# Patient Record
Sex: MALE | Race: WHITE | Employment: OTHER | URBAN - METROPOLITAN AREA
[De-identification: names, ages, dates, MRNs, and addresses within clinical notes are randomized per-mention and may not be internally consistent; named-entity substitution may affect disease eponyms.]

---

## 2017-01-11 RX ORDER — SIMVASTATIN 20 MG
TABLET ORAL
Qty: 90 TABLET | Refills: 0 | Status: SHIPPED | OUTPATIENT
Start: 2017-01-11 | End: 2017-04-07 | Stop reason: SDUPTHER

## 2017-04-07 ENCOUNTER — OFFICE VISIT (OUTPATIENT)
Dept: FAMILY MEDICINE CLINIC | Age: 67
End: 2017-04-07

## 2017-04-07 VITALS
DIASTOLIC BLOOD PRESSURE: 80 MMHG | SYSTOLIC BLOOD PRESSURE: 120 MMHG | HEIGHT: 76 IN | WEIGHT: 219.4 LBS | BODY MASS INDEX: 26.72 KG/M2

## 2017-04-07 DIAGNOSIS — E78.2 MIXED HYPERLIPIDEMIA: Primary | ICD-10-CM

## 2017-04-07 DIAGNOSIS — I48.0 PAROXYSMAL ATRIAL FIBRILLATION (HCC): ICD-10-CM

## 2017-04-07 DIAGNOSIS — Z29.9 PREVENTIVE MEASURE: ICD-10-CM

## 2017-04-07 DIAGNOSIS — R35.1 BPH ASSOCIATED WITH NOCTURIA: ICD-10-CM

## 2017-04-07 DIAGNOSIS — N40.1 BPH ASSOCIATED WITH NOCTURIA: ICD-10-CM

## 2017-04-07 DIAGNOSIS — F41.9 ANXIETY: ICD-10-CM

## 2017-04-07 LAB
A/G RATIO: 2 (ref 1.1–2.2)
ALBUMIN SERPL-MCNC: 4.6 G/DL (ref 3.4–5)
ALP BLD-CCNC: 87 U/L (ref 40–129)
ALT SERPL-CCNC: 21 U/L (ref 10–40)
ANION GAP SERPL CALCULATED.3IONS-SCNC: 13 MMOL/L (ref 3–16)
AST SERPL-CCNC: 24 U/L (ref 15–37)
BILIRUB SERPL-MCNC: 0.7 MG/DL (ref 0–1)
BUN BLDV-MCNC: 14 MG/DL (ref 7–20)
CALCIUM SERPL-MCNC: 9.1 MG/DL (ref 8.3–10.6)
CHLORIDE BLD-SCNC: 102 MMOL/L (ref 99–110)
CHOLESTEROL, TOTAL: 192 MG/DL (ref 0–199)
CO2: 25 MMOL/L (ref 21–32)
CREAT SERPL-MCNC: 0.8 MG/DL (ref 0.8–1.3)
GFR AFRICAN AMERICAN: >60
GFR NON-AFRICAN AMERICAN: >60
GLOBULIN: 2.3 G/DL
GLUCOSE BLD-MCNC: 81 MG/DL (ref 70–99)
HCT VFR BLD CALC: 50.6 % (ref 40.5–52.5)
HDLC SERPL-MCNC: 65 MG/DL (ref 40–60)
HEMOGLOBIN: 16.6 G/DL (ref 13.5–17.5)
HEPATITIS C ANTIBODY INTERPRETATION: NORMAL
LDL CHOLESTEROL CALCULATED: 112 MG/DL
MCH RBC QN AUTO: 30.9 PG (ref 26–34)
MCHC RBC AUTO-ENTMCNC: 32.8 G/DL (ref 31–36)
MCV RBC AUTO: 94.1 FL (ref 80–100)
PDW BLD-RTO: 14.2 % (ref 12.4–15.4)
PLATELET # BLD: 109 K/UL (ref 135–450)
PMV BLD AUTO: 12.1 FL (ref 5–10.5)
POTASSIUM SERPL-SCNC: 4.7 MMOL/L (ref 3.5–5.1)
PROSTATE SPECIFIC ANTIGEN: 1.53 NG/ML (ref 0–4)
RBC # BLD: 5.37 M/UL (ref 4.2–5.9)
SODIUM BLD-SCNC: 140 MMOL/L (ref 136–145)
TOTAL PROTEIN: 6.9 G/DL (ref 6.4–8.2)
TRIGL SERPL-MCNC: 76 MG/DL (ref 0–150)
TSH REFLEX: 2.02 UIU/ML (ref 0.27–4.2)
VLDLC SERPL CALC-MCNC: 15 MG/DL
WBC # BLD: 5.6 K/UL (ref 4–11)

## 2017-04-07 PROCEDURE — G8420 CALC BMI NORM PARAMETERS: HCPCS | Performed by: FAMILY MEDICINE

## 2017-04-07 PROCEDURE — 3017F COLORECTAL CA SCREEN DOC REV: CPT | Performed by: FAMILY MEDICINE

## 2017-04-07 PROCEDURE — G8427 DOCREV CUR MEDS BY ELIG CLIN: HCPCS | Performed by: FAMILY MEDICINE

## 2017-04-07 PROCEDURE — 99214 OFFICE O/P EST MOD 30 MIN: CPT | Performed by: FAMILY MEDICINE

## 2017-04-07 PROCEDURE — 1123F ACP DISCUSS/DSCN MKR DOCD: CPT | Performed by: FAMILY MEDICINE

## 2017-04-07 PROCEDURE — 4040F PNEUMOC VAC/ADMIN/RCVD: CPT | Performed by: FAMILY MEDICINE

## 2017-04-07 PROCEDURE — 4004F PT TOBACCO SCREEN RCVD TLK: CPT | Performed by: FAMILY MEDICINE

## 2017-04-07 PROCEDURE — 36415 COLL VENOUS BLD VENIPUNCTURE: CPT | Performed by: FAMILY MEDICINE

## 2017-04-07 RX ORDER — SIMVASTATIN 20 MG
TABLET ORAL
Qty: 90 TABLET | Refills: 1 | Status: SHIPPED | OUTPATIENT
Start: 2017-04-07 | End: 2017-10-07 | Stop reason: SDUPTHER

## 2017-04-07 RX ORDER — LORAZEPAM 0.5 MG/1
0.5 TABLET ORAL 2 TIMES DAILY PRN
Qty: 30 TABLET | Refills: 1 | Status: SHIPPED | OUTPATIENT
Start: 2017-04-07 | End: 2017-05-07

## 2017-04-07 ASSESSMENT — ENCOUNTER SYMPTOMS
DIARRHEA: 0
CHEST TIGHTNESS: 0
WHEEZING: 0
ABDOMINAL PAIN: 0
CONSTIPATION: 0
TROUBLE SWALLOWING: 0
BLOOD IN STOOL: 0
SHORTNESS OF BREATH: 0
SORE THROAT: 0

## 2017-04-09 ASSESSMENT — PATIENT HEALTH QUESTIONNAIRE - PHQ9
1. LITTLE INTEREST OR PLEASURE IN DOING THINGS: 1
SUM OF ALL RESPONSES TO PHQ QUESTIONS 1-9: 1
2. FEELING DOWN, DEPRESSED OR HOPELESS: 0
SUM OF ALL RESPONSES TO PHQ9 QUESTIONS 1 & 2: 1

## 2017-04-19 RX ORDER — VALACYCLOVIR HYDROCHLORIDE 500 MG/1
TABLET, FILM COATED ORAL
Qty: 8 TABLET | Refills: 0 | Status: SHIPPED | OUTPATIENT
Start: 2017-04-19 | End: 2019-02-25

## 2017-05-05 ENCOUNTER — TELEPHONE (OUTPATIENT)
Dept: FAMILY MEDICINE CLINIC | Age: 67
End: 2017-05-05

## 2017-10-07 DIAGNOSIS — E78.2 MIXED HYPERLIPIDEMIA: ICD-10-CM

## 2017-10-08 RX ORDER — SIMVASTATIN 20 MG
TABLET ORAL
Qty: 90 TABLET | Refills: 0 | Status: SHIPPED | OUTPATIENT
Start: 2017-10-08 | End: 2017-12-29 | Stop reason: SDUPTHER

## 2017-12-29 DIAGNOSIS — E78.2 MIXED HYPERLIPIDEMIA: ICD-10-CM

## 2017-12-29 RX ORDER — SIMVASTATIN 20 MG
TABLET ORAL
Qty: 90 TABLET | Refills: 0 | Status: SHIPPED | OUTPATIENT
Start: 2017-12-29 | End: 2018-02-20 | Stop reason: SDUPTHER

## 2018-02-20 ENCOUNTER — TELEPHONE (OUTPATIENT)
Dept: FAMILY MEDICINE CLINIC | Age: 68
End: 2018-02-20

## 2018-02-20 DIAGNOSIS — E78.2 MIXED HYPERLIPIDEMIA: ICD-10-CM

## 2018-02-20 RX ORDER — SIMVASTATIN 20 MG
TABLET ORAL
Qty: 90 TABLET | Refills: 1 | Status: SHIPPED | OUTPATIENT
Start: 2018-02-20 | End: 2018-03-20 | Stop reason: SDUPTHER

## 2018-02-20 NOTE — TELEPHONE ENCOUNTER
Pt coming in for an appt Thursday for BW and dalia 2-27 to see Dr Sandra Munguia  Need orders for BW  Please advise

## 2018-02-22 ENCOUNTER — NURSE ONLY (OUTPATIENT)
Dept: FAMILY MEDICINE CLINIC | Age: 68
End: 2018-02-22

## 2018-02-22 DIAGNOSIS — Z12.5 ENCOUNTER FOR SCREENING FOR MALIGNANT NEOPLASM OF PROSTATE: ICD-10-CM

## 2018-02-22 DIAGNOSIS — Z00.00 HEALTHCARE MAINTENANCE: Primary | ICD-10-CM

## 2018-02-22 LAB
A/G RATIO: 2 (ref 1.1–2.2)
ALBUMIN SERPL-MCNC: 4.6 G/DL (ref 3.4–5)
ALP BLD-CCNC: 89 U/L (ref 40–129)
ALT SERPL-CCNC: 25 U/L (ref 10–40)
ANION GAP SERPL CALCULATED.3IONS-SCNC: 11 MMOL/L (ref 3–16)
AST SERPL-CCNC: 29 U/L (ref 15–37)
BASOPHILS ABSOLUTE: 0 K/UL (ref 0–0.2)
BASOPHILS RELATIVE PERCENT: 1 %
BILIRUB SERPL-MCNC: 0.7 MG/DL (ref 0–1)
BILIRUBIN, POC: NORMAL
BLOOD URINE, POC: NORMAL
BUN BLDV-MCNC: 17 MG/DL (ref 7–20)
CALCIUM SERPL-MCNC: 8.9 MG/DL (ref 8.3–10.6)
CHLORIDE BLD-SCNC: 102 MMOL/L (ref 99–110)
CHOLESTEROL, TOTAL: 171 MG/DL (ref 0–199)
CLARITY, POC: NORMAL
CO2: 28 MMOL/L (ref 21–32)
COLOR, POC: YELLOW
CREAT SERPL-MCNC: 0.8 MG/DL (ref 0.8–1.3)
EOSINOPHILS ABSOLUTE: 0.1 K/UL (ref 0–0.6)
EOSINOPHILS RELATIVE PERCENT: 2.1 %
GFR AFRICAN AMERICAN: >60
GFR NON-AFRICAN AMERICAN: >60
GLOBULIN: 2.3 G/DL
GLUCOSE BLD-MCNC: 96 MG/DL (ref 70–99)
GLUCOSE URINE, POC: NORMAL
HCT VFR BLD CALC: 49.4 % (ref 40.5–52.5)
HDLC SERPL-MCNC: 59 MG/DL (ref 40–60)
HEMOGLOBIN: 16.6 G/DL (ref 13.5–17.5)
KETONES, POC: NORMAL
LDL CHOLESTEROL CALCULATED: 97 MG/DL
LEUKOCYTE EST, POC: NORMAL
LYMPHOCYTES ABSOLUTE: 1 K/UL (ref 1–5.1)
LYMPHOCYTES RELATIVE PERCENT: 19.8 %
MCH RBC QN AUTO: 31.6 PG (ref 26–34)
MCHC RBC AUTO-ENTMCNC: 33.7 G/DL (ref 31–36)
MCV RBC AUTO: 94 FL (ref 80–100)
MONOCYTES ABSOLUTE: 0.4 K/UL (ref 0–1.3)
MONOCYTES RELATIVE PERCENT: 8.5 %
NEUTROPHILS ABSOLUTE: 3.5 K/UL (ref 1.7–7.7)
NEUTROPHILS RELATIVE PERCENT: 68.6 %
NITRITE, POC: NORMAL
PDW BLD-RTO: 13.8 % (ref 12.4–15.4)
PH, POC: 6.5
PLATELET # BLD: 93 K/UL (ref 135–450)
PMV BLD AUTO: 12 FL (ref 5–10.5)
POTASSIUM SERPL-SCNC: 4.7 MMOL/L (ref 3.5–5.1)
PROSTATE SPECIFIC ANTIGEN: 1.18 NG/ML (ref 0–4)
PROTEIN, POC: NORMAL
RBC # BLD: 5.26 M/UL (ref 4.2–5.9)
SODIUM BLD-SCNC: 141 MMOL/L (ref 136–145)
SPECIFIC GRAVITY, POC: 1.02
TOTAL PROTEIN: 6.9 G/DL (ref 6.4–8.2)
TRIGL SERPL-MCNC: 76 MG/DL (ref 0–150)
TSH SERPL DL<=0.05 MIU/L-ACNC: 2.49 UIU/ML (ref 0.27–4.2)
UROBILINOGEN, POC: 0.2
VLDLC SERPL CALC-MCNC: 15 MG/DL
WBC # BLD: 5.1 K/UL (ref 4–11)

## 2018-02-22 PROCEDURE — 36415 COLL VENOUS BLD VENIPUNCTURE: CPT | Performed by: FAMILY MEDICINE

## 2018-02-22 PROCEDURE — 81002 URINALYSIS NONAUTO W/O SCOPE: CPT | Performed by: FAMILY MEDICINE

## 2018-02-27 ENCOUNTER — OFFICE VISIT (OUTPATIENT)
Dept: FAMILY MEDICINE CLINIC | Age: 68
End: 2018-02-27

## 2018-02-27 VITALS
BODY MASS INDEX: 27.18 KG/M2 | DIASTOLIC BLOOD PRESSURE: 80 MMHG | WEIGHT: 223.2 LBS | HEIGHT: 76 IN | SYSTOLIC BLOOD PRESSURE: 120 MMHG

## 2018-02-27 DIAGNOSIS — I48.0 PAROXYSMAL ATRIAL FIBRILLATION (HCC): ICD-10-CM

## 2018-02-27 DIAGNOSIS — E78.2 MIXED HYPERLIPIDEMIA: Primary | ICD-10-CM

## 2018-02-27 PROCEDURE — 4004F PT TOBACCO SCREEN RCVD TLK: CPT | Performed by: FAMILY MEDICINE

## 2018-02-27 PROCEDURE — 1123F ACP DISCUSS/DSCN MKR DOCD: CPT | Performed by: FAMILY MEDICINE

## 2018-02-27 PROCEDURE — 4040F PNEUMOC VAC/ADMIN/RCVD: CPT | Performed by: FAMILY MEDICINE

## 2018-02-27 PROCEDURE — 99214 OFFICE O/P EST MOD 30 MIN: CPT | Performed by: FAMILY MEDICINE

## 2018-02-27 PROCEDURE — G8484 FLU IMMUNIZE NO ADMIN: HCPCS | Performed by: FAMILY MEDICINE

## 2018-02-27 PROCEDURE — 3017F COLORECTAL CA SCREEN DOC REV: CPT | Performed by: FAMILY MEDICINE

## 2018-02-27 PROCEDURE — G8419 CALC BMI OUT NRM PARAM NOF/U: HCPCS | Performed by: FAMILY MEDICINE

## 2018-02-27 PROCEDURE — G8427 DOCREV CUR MEDS BY ELIG CLIN: HCPCS | Performed by: FAMILY MEDICINE

## 2018-02-27 ASSESSMENT — PATIENT HEALTH QUESTIONNAIRE - PHQ9
1. LITTLE INTEREST OR PLEASURE IN DOING THINGS: 0
SUM OF ALL RESPONSES TO PHQ9 QUESTIONS 1 & 2: 0
SUM OF ALL RESPONSES TO PHQ QUESTIONS 1-9: 0
2. FEELING DOWN, DEPRESSED OR HOPELESS: 0

## 2018-02-27 ASSESSMENT — ENCOUNTER SYMPTOMS
CONSTIPATION: 0
WHEEZING: 0
CHEST TIGHTNESS: 0
DIARRHEA: 0
ABDOMINAL PAIN: 0
SHORTNESS OF BREATH: 0
TROUBLE SWALLOWING: 0
BLOOD IN STOOL: 0
SORE THROAT: 0

## 2018-03-20 ENCOUNTER — TELEPHONE (OUTPATIENT)
Dept: FAMILY MEDICINE CLINIC | Age: 68
End: 2018-03-20

## 2018-03-20 DIAGNOSIS — E78.2 MIXED HYPERLIPIDEMIA: ICD-10-CM

## 2018-03-20 RX ORDER — SIMVASTATIN 20 MG
TABLET ORAL
Qty: 90 TABLET | Refills: 1 | Status: SHIPPED | OUTPATIENT
Start: 2018-03-20 | End: 2018-09-12 | Stop reason: SDUPTHER

## 2019-01-10 ENCOUNTER — OFFICE VISIT (OUTPATIENT)
Dept: FAMILY MEDICINE CLINIC | Age: 69
End: 2019-01-10
Payer: MEDICARE

## 2019-01-10 VITALS
HEIGHT: 76 IN | TEMPERATURE: 97.9 F | BODY MASS INDEX: 27.4 KG/M2 | SYSTOLIC BLOOD PRESSURE: 124 MMHG | DIASTOLIC BLOOD PRESSURE: 60 MMHG | WEIGHT: 225 LBS

## 2019-01-10 DIAGNOSIS — B96.89 ACUTE BACTERIAL SINUSITIS: Primary | ICD-10-CM

## 2019-01-10 DIAGNOSIS — I48.0 PAROXYSMAL ATRIAL FIBRILLATION (HCC): ICD-10-CM

## 2019-01-10 DIAGNOSIS — J01.90 ACUTE BACTERIAL SINUSITIS: Primary | ICD-10-CM

## 2019-01-10 PROCEDURE — 3017F COLORECTAL CA SCREEN DOC REV: CPT | Performed by: FAMILY MEDICINE

## 2019-01-10 PROCEDURE — 99213 OFFICE O/P EST LOW 20 MIN: CPT | Performed by: FAMILY MEDICINE

## 2019-01-10 PROCEDURE — G8484 FLU IMMUNIZE NO ADMIN: HCPCS | Performed by: FAMILY MEDICINE

## 2019-01-10 PROCEDURE — 4040F PNEUMOC VAC/ADMIN/RCVD: CPT | Performed by: FAMILY MEDICINE

## 2019-01-10 PROCEDURE — G8427 DOCREV CUR MEDS BY ELIG CLIN: HCPCS | Performed by: FAMILY MEDICINE

## 2019-01-10 PROCEDURE — G8419 CALC BMI OUT NRM PARAM NOF/U: HCPCS | Performed by: FAMILY MEDICINE

## 2019-01-10 PROCEDURE — G8510 SCR DEP NEG, NO PLAN REQD: HCPCS | Performed by: FAMILY MEDICINE

## 2019-01-10 PROCEDURE — 1101F PT FALLS ASSESS-DOCD LE1/YR: CPT | Performed by: FAMILY MEDICINE

## 2019-01-10 PROCEDURE — 1123F ACP DISCUSS/DSCN MKR DOCD: CPT | Performed by: FAMILY MEDICINE

## 2019-01-10 PROCEDURE — 4004F PT TOBACCO SCREEN RCVD TLK: CPT | Performed by: FAMILY MEDICINE

## 2019-01-10 RX ORDER — AZITHROMYCIN 250 MG/1
TABLET, FILM COATED ORAL
Qty: 1 PACKET | Refills: 0 | Status: SHIPPED | OUTPATIENT
Start: 2019-01-10 | End: 2019-02-25

## 2019-01-10 ASSESSMENT — PATIENT HEALTH QUESTIONNAIRE - PHQ9
1. LITTLE INTEREST OR PLEASURE IN DOING THINGS: 0
SUM OF ALL RESPONSES TO PHQ QUESTIONS 1-9: 0
SUM OF ALL RESPONSES TO PHQ QUESTIONS 1-9: 0
SUM OF ALL RESPONSES TO PHQ9 QUESTIONS 1 & 2: 0
2. FEELING DOWN, DEPRESSED OR HOPELESS: 0

## 2019-01-10 ASSESSMENT — ENCOUNTER SYMPTOMS
SINUS PAIN: 1
SINUS PRESSURE: 1
ABDOMINAL PAIN: 0
SHORTNESS OF BREATH: 0
COUGH: 0
WHEEZING: 0
EYE PAIN: 0

## 2019-01-16 ENCOUNTER — PRE-EVALUATION (OUTPATIENT)
Dept: ORTHOPEDIC SURGERY | Age: 69
End: 2019-01-16

## 2019-01-16 ENCOUNTER — OFFICE VISIT (OUTPATIENT)
Dept: ORTHOPEDIC SURGERY | Age: 69
End: 2019-01-16
Payer: MEDICARE

## 2019-01-16 VITALS
HEIGHT: 76 IN | BODY MASS INDEX: 27.4 KG/M2 | RESPIRATION RATE: 16 BRPM | SYSTOLIC BLOOD PRESSURE: 145 MMHG | DIASTOLIC BLOOD PRESSURE: 89 MMHG | HEART RATE: 61 BPM | WEIGHT: 225 LBS

## 2019-01-16 DIAGNOSIS — S46.012A STRAIN OF MUSC/TEND THE ROTATOR CUFF OF LEFT SHOULDER, INIT: ICD-10-CM

## 2019-01-16 DIAGNOSIS — S46.011A ROTATOR CUFF STRAIN, RIGHT, INITIAL ENCOUNTER: Primary | ICD-10-CM

## 2019-01-16 DIAGNOSIS — M25.511 ACUTE PAIN OF RIGHT SHOULDER: ICD-10-CM

## 2019-01-16 PROCEDURE — G8484 FLU IMMUNIZE NO ADMIN: HCPCS | Performed by: ORTHOPAEDIC SURGERY

## 2019-01-16 PROCEDURE — 4040F PNEUMOC VAC/ADMIN/RCVD: CPT | Performed by: ORTHOPAEDIC SURGERY

## 2019-01-16 PROCEDURE — 1123F ACP DISCUSS/DSCN MKR DOCD: CPT | Performed by: ORTHOPAEDIC SURGERY

## 2019-01-16 PROCEDURE — 4004F PT TOBACCO SCREEN RCVD TLK: CPT | Performed by: ORTHOPAEDIC SURGERY

## 2019-01-16 PROCEDURE — G8419 CALC BMI OUT NRM PARAM NOF/U: HCPCS | Performed by: ORTHOPAEDIC SURGERY

## 2019-01-16 PROCEDURE — APPSS30 APP SPLIT SHARED TIME 16-30 MINUTES: Performed by: NURSE PRACTITIONER

## 2019-01-16 PROCEDURE — 3017F COLORECTAL CA SCREEN DOC REV: CPT | Performed by: ORTHOPAEDIC SURGERY

## 2019-01-16 PROCEDURE — 1101F PT FALLS ASSESS-DOCD LE1/YR: CPT | Performed by: ORTHOPAEDIC SURGERY

## 2019-01-16 PROCEDURE — 99203 OFFICE O/P NEW LOW 30 MIN: CPT | Performed by: ORTHOPAEDIC SURGERY

## 2019-01-16 PROCEDURE — G8427 DOCREV CUR MEDS BY ELIG CLIN: HCPCS | Performed by: ORTHOPAEDIC SURGERY

## 2019-01-16 RX ORDER — NAPROXEN 500 MG/1
500 TABLET ORAL 2 TIMES DAILY WITH MEALS
Qty: 60 TABLET | Refills: 0 | Status: SHIPPED | OUTPATIENT
Start: 2019-01-16 | End: 2019-03-04 | Stop reason: ALTCHOICE

## 2019-01-22 ENCOUNTER — HOSPITAL ENCOUNTER (OUTPATIENT)
Dept: PHYSICAL THERAPY | Age: 69
Setting detail: THERAPIES SERIES
Discharge: HOME | End: 2019-01-22
Payer: MEDICARE

## 2019-01-22 PROCEDURE — 97140 MANUAL THERAPY 1/> REGIONS: CPT

## 2019-01-22 PROCEDURE — 97161 PT EVAL LOW COMPLEX 20 MIN: CPT

## 2019-01-22 PROCEDURE — 97110 THERAPEUTIC EXERCISES: CPT

## 2019-01-22 ASSESSMENT — PAIN DESCRIPTION - PROGRESSION: CLINICAL_PROGRESSION: NOT CHANGED

## 2019-01-22 ASSESSMENT — PAIN DESCRIPTION - LOCATION: LOCATION: SHOULDER

## 2019-01-22 ASSESSMENT — PAIN DESCRIPTION - ORIENTATION: ORIENTATION: RIGHT

## 2019-01-22 ASSESSMENT — PAIN DESCRIPTION - DESCRIPTORS: DESCRIPTORS: SHOOTING

## 2019-01-24 ENCOUNTER — HOSPITAL ENCOUNTER (OUTPATIENT)
Dept: PHYSICAL THERAPY | Age: 69
Setting detail: THERAPIES SERIES
Discharge: HOME | End: 2019-01-24
Payer: MEDICARE

## 2019-01-24 PROCEDURE — 97110 THERAPEUTIC EXERCISES: CPT

## 2019-01-29 ENCOUNTER — HOSPITAL ENCOUNTER (OUTPATIENT)
Dept: PHYSICAL THERAPY | Age: 69
Setting detail: THERAPIES SERIES
Discharge: HOME | End: 2019-01-29
Payer: MEDICARE

## 2019-01-29 PROCEDURE — 97110 THERAPEUTIC EXERCISES: CPT

## 2019-01-31 ENCOUNTER — HOSPITAL ENCOUNTER (OUTPATIENT)
Dept: PHYSICAL THERAPY | Age: 69
Setting detail: THERAPIES SERIES
Discharge: HOME | End: 2019-01-31
Payer: MEDICARE

## 2019-01-31 PROCEDURE — 97110 THERAPEUTIC EXERCISES: CPT

## 2019-02-05 ENCOUNTER — HOSPITAL ENCOUNTER (OUTPATIENT)
Dept: PHYSICAL THERAPY | Age: 69
Setting detail: THERAPIES SERIES
Discharge: HOME | End: 2019-02-05
Payer: MEDICARE

## 2019-02-05 PROCEDURE — 97110 THERAPEUTIC EXERCISES: CPT

## 2019-02-07 ENCOUNTER — HOSPITAL ENCOUNTER (OUTPATIENT)
Dept: PHYSICAL THERAPY | Age: 69
Setting detail: THERAPIES SERIES
Discharge: HOME | End: 2019-02-07
Payer: MEDICARE

## 2019-02-07 PROCEDURE — 97110 THERAPEUTIC EXERCISES: CPT

## 2019-02-12 ENCOUNTER — HOSPITAL ENCOUNTER (OUTPATIENT)
Dept: PHYSICAL THERAPY | Age: 69
Setting detail: THERAPIES SERIES
Discharge: HOME | End: 2019-02-12
Payer: MEDICARE

## 2019-02-12 PROCEDURE — 97110 THERAPEUTIC EXERCISES: CPT

## 2019-02-13 ENCOUNTER — TELEPHONE (OUTPATIENT)
Dept: ORTHOPEDIC SURGERY | Age: 69
End: 2019-02-13

## 2019-02-13 DIAGNOSIS — S46.011A ROTATOR CUFF STRAIN, RIGHT, INITIAL ENCOUNTER: ICD-10-CM

## 2019-02-13 DIAGNOSIS — M25.511 RIGHT SHOULDER PAIN, UNSPECIFIED CHRONICITY: Primary | ICD-10-CM

## 2019-02-14 ENCOUNTER — APPOINTMENT (OUTPATIENT)
Dept: PHYSICAL THERAPY | Age: 69
End: 2019-02-14
Payer: MEDICARE

## 2019-02-18 ENCOUNTER — HOSPITAL ENCOUNTER (OUTPATIENT)
Dept: MRI IMAGING | Age: 69
Discharge: HOME | End: 2019-02-18
Payer: MEDICARE

## 2019-02-18 DIAGNOSIS — S46.011A ROTATOR CUFF STRAIN, RIGHT, INITIAL ENCOUNTER: ICD-10-CM

## 2019-02-18 DIAGNOSIS — M25.511 RIGHT SHOULDER PAIN, UNSPECIFIED CHRONICITY: ICD-10-CM

## 2019-02-18 PROCEDURE — 73221 MRI JOINT UPR EXTREM W/O DYE: CPT

## 2019-02-22 ENCOUNTER — OFFICE VISIT (OUTPATIENT)
Dept: ORTHOPEDIC SURGERY | Age: 69
End: 2019-02-22
Payer: MEDICARE

## 2019-02-22 VITALS
DIASTOLIC BLOOD PRESSURE: 94 MMHG | SYSTOLIC BLOOD PRESSURE: 156 MMHG | RESPIRATION RATE: 16 BRPM | HEIGHT: 76 IN | BODY MASS INDEX: 27.4 KG/M2 | WEIGHT: 225 LBS | HEART RATE: 68 BPM

## 2019-02-22 DIAGNOSIS — S46.811A TRAUMATIC TEAR OF SUPRASPINATUS TENDON OF RIGHT SHOULDER, INITIAL ENCOUNTER: Primary | ICD-10-CM

## 2019-02-22 PROCEDURE — 4040F PNEUMOC VAC/ADMIN/RCVD: CPT | Performed by: NURSE PRACTITIONER

## 2019-02-22 PROCEDURE — G8419 CALC BMI OUT NRM PARAM NOF/U: HCPCS | Performed by: NURSE PRACTITIONER

## 2019-02-22 PROCEDURE — 3017F COLORECTAL CA SCREEN DOC REV: CPT | Performed by: NURSE PRACTITIONER

## 2019-02-22 PROCEDURE — G8427 DOCREV CUR MEDS BY ELIG CLIN: HCPCS | Performed by: NURSE PRACTITIONER

## 2019-02-22 PROCEDURE — 99213 OFFICE O/P EST LOW 20 MIN: CPT | Performed by: NURSE PRACTITIONER

## 2019-02-22 PROCEDURE — 4004F PT TOBACCO SCREEN RCVD TLK: CPT | Performed by: NURSE PRACTITIONER

## 2019-02-22 PROCEDURE — 1123F ACP DISCUSS/DSCN MKR DOCD: CPT | Performed by: NURSE PRACTITIONER

## 2019-02-22 PROCEDURE — G8484 FLU IMMUNIZE NO ADMIN: HCPCS | Performed by: NURSE PRACTITIONER

## 2019-02-22 PROCEDURE — 1101F PT FALLS ASSESS-DOCD LE1/YR: CPT | Performed by: NURSE PRACTITIONER

## 2019-02-25 ENCOUNTER — OFFICE VISIT (OUTPATIENT)
Dept: FAMILY MEDICINE CLINIC | Age: 69
End: 2019-02-25
Payer: MEDICARE

## 2019-02-25 ENCOUNTER — OFFICE VISIT (OUTPATIENT)
Dept: ORTHOPEDIC SURGERY | Age: 69
End: 2019-02-25
Payer: MEDICARE

## 2019-02-25 VITALS
DIASTOLIC BLOOD PRESSURE: 90 MMHG | BODY MASS INDEX: 27.06 KG/M2 | HEIGHT: 76 IN | SYSTOLIC BLOOD PRESSURE: 146 MMHG | WEIGHT: 222.2 LBS

## 2019-02-25 VITALS
HEART RATE: 46 BPM | BODY MASS INDEX: 27.05 KG/M2 | SYSTOLIC BLOOD PRESSURE: 158 MMHG | RESPIRATION RATE: 17 BRPM | WEIGHT: 222.1 LBS | DIASTOLIC BLOOD PRESSURE: 112 MMHG | HEIGHT: 76 IN

## 2019-02-25 DIAGNOSIS — I48.0 PAROXYSMAL ATRIAL FIBRILLATION (HCC): ICD-10-CM

## 2019-02-25 DIAGNOSIS — I10 ESSENTIAL HYPERTENSION: Primary | ICD-10-CM

## 2019-02-25 DIAGNOSIS — M75.121 COMPLETE TEAR OF RIGHT ROTATOR CUFF: Primary | ICD-10-CM

## 2019-02-25 PROCEDURE — 1101F PT FALLS ASSESS-DOCD LE1/YR: CPT | Performed by: FAMILY MEDICINE

## 2019-02-25 PROCEDURE — 4004F PT TOBACCO SCREEN RCVD TLK: CPT | Performed by: FAMILY MEDICINE

## 2019-02-25 PROCEDURE — 99214 OFFICE O/P EST MOD 30 MIN: CPT | Performed by: ORTHOPAEDIC SURGERY

## 2019-02-25 PROCEDURE — 1101F PT FALLS ASSESS-DOCD LE1/YR: CPT | Performed by: ORTHOPAEDIC SURGERY

## 2019-02-25 PROCEDURE — 4040F PNEUMOC VAC/ADMIN/RCVD: CPT | Performed by: FAMILY MEDICINE

## 2019-02-25 PROCEDURE — G8419 CALC BMI OUT NRM PARAM NOF/U: HCPCS | Performed by: FAMILY MEDICINE

## 2019-02-25 PROCEDURE — 99213 OFFICE O/P EST LOW 20 MIN: CPT | Performed by: FAMILY MEDICINE

## 2019-02-25 PROCEDURE — G8419 CALC BMI OUT NRM PARAM NOF/U: HCPCS | Performed by: ORTHOPAEDIC SURGERY

## 2019-02-25 PROCEDURE — 4004F PT TOBACCO SCREEN RCVD TLK: CPT | Performed by: ORTHOPAEDIC SURGERY

## 2019-02-25 PROCEDURE — G8484 FLU IMMUNIZE NO ADMIN: HCPCS | Performed by: ORTHOPAEDIC SURGERY

## 2019-02-25 PROCEDURE — G8427 DOCREV CUR MEDS BY ELIG CLIN: HCPCS | Performed by: ORTHOPAEDIC SURGERY

## 2019-02-25 PROCEDURE — 1123F ACP DISCUSS/DSCN MKR DOCD: CPT | Performed by: FAMILY MEDICINE

## 2019-02-25 PROCEDURE — 3017F COLORECTAL CA SCREEN DOC REV: CPT | Performed by: ORTHOPAEDIC SURGERY

## 2019-02-25 PROCEDURE — 3017F COLORECTAL CA SCREEN DOC REV: CPT | Performed by: FAMILY MEDICINE

## 2019-02-25 PROCEDURE — G8484 FLU IMMUNIZE NO ADMIN: HCPCS | Performed by: FAMILY MEDICINE

## 2019-02-25 PROCEDURE — G8427 DOCREV CUR MEDS BY ELIG CLIN: HCPCS | Performed by: FAMILY MEDICINE

## 2019-02-25 PROCEDURE — 1123F ACP DISCUSS/DSCN MKR DOCD: CPT | Performed by: ORTHOPAEDIC SURGERY

## 2019-02-25 PROCEDURE — 4040F PNEUMOC VAC/ADMIN/RCVD: CPT | Performed by: ORTHOPAEDIC SURGERY

## 2019-02-25 RX ORDER — LISINOPRIL AND HYDROCHLOROTHIAZIDE 12.5; 1 MG/1; MG/1
1 TABLET ORAL DAILY
Qty: 90 TABLET | Refills: 1 | Status: SHIPPED | OUTPATIENT
Start: 2019-02-25 | End: 2019-04-22 | Stop reason: SINTOL

## 2019-02-25 ASSESSMENT — PATIENT HEALTH QUESTIONNAIRE - PHQ9
2. FEELING DOWN, DEPRESSED OR HOPELESS: 0
1. LITTLE INTEREST OR PLEASURE IN DOING THINGS: 0
SUM OF ALL RESPONSES TO PHQ9 QUESTIONS 1 & 2: 0
SUM OF ALL RESPONSES TO PHQ QUESTIONS 1-9: 0
SUM OF ALL RESPONSES TO PHQ QUESTIONS 1-9: 0

## 2019-02-25 ASSESSMENT — ENCOUNTER SYMPTOMS
WHEEZING: 0
EYE PAIN: 0
COUGH: 0
ABDOMINAL PAIN: 0
SHORTNESS OF BREATH: 0

## 2019-02-27 ENCOUNTER — TELEPHONE (OUTPATIENT)
Dept: FAMILY MEDICINE CLINIC | Age: 69
End: 2019-02-27

## 2019-03-04 ENCOUNTER — OFFICE VISIT (OUTPATIENT)
Dept: FAMILY MEDICINE CLINIC | Age: 69
End: 2019-03-04
Payer: MEDICARE

## 2019-03-04 VITALS
WEIGHT: 222 LBS | BODY MASS INDEX: 27.03 KG/M2 | RESPIRATION RATE: 16 BRPM | DIASTOLIC BLOOD PRESSURE: 86 MMHG | HEIGHT: 76 IN | OXYGEN SATURATION: 98 % | SYSTOLIC BLOOD PRESSURE: 134 MMHG | HEART RATE: 74 BPM | TEMPERATURE: 98.1 F

## 2019-03-04 DIAGNOSIS — E78.2 MIXED HYPERLIPIDEMIA: ICD-10-CM

## 2019-03-04 DIAGNOSIS — I10 ESSENTIAL HYPERTENSION: ICD-10-CM

## 2019-03-04 DIAGNOSIS — M75.121 COMPLETE TEAR OF RIGHT ROTATOR CUFF: ICD-10-CM

## 2019-03-04 DIAGNOSIS — I48.0 PAROXYSMAL ATRIAL FIBRILLATION (HCC): ICD-10-CM

## 2019-03-04 DIAGNOSIS — Z01.818 PREOP GENERAL PHYSICAL EXAM: Primary | ICD-10-CM

## 2019-03-04 LAB
ANION GAP SERPL CALCULATED.3IONS-SCNC: 12 MMOL/L (ref 3–16)
BILIRUBIN, POC: NORMAL
BLOOD URINE, POC: NORMAL
BUN BLDV-MCNC: 19 MG/DL (ref 7–20)
CALCIUM SERPL-MCNC: 10 MG/DL (ref 8.3–10.6)
CHLORIDE BLD-SCNC: 96 MMOL/L (ref 99–110)
CHOLESTEROL, TOTAL: 184 MG/DL (ref 0–199)
CLARITY, POC: NORMAL
CO2: 31 MMOL/L (ref 21–32)
COLOR, POC: YELLOW
CREAT SERPL-MCNC: 0.9 MG/DL (ref 0.8–1.3)
GFR AFRICAN AMERICAN: >60
GFR NON-AFRICAN AMERICAN: >60
GLUCOSE BLD-MCNC: 122 MG/DL (ref 70–99)
GLUCOSE URINE, POC: NORMAL
HDLC SERPL-MCNC: 54 MG/DL (ref 40–60)
KETONES, POC: NORMAL
LDL CHOLESTEROL CALCULATED: 88 MG/DL
LEUKOCYTE EST, POC: NORMAL
NITRITE, POC: NORMAL
PH, POC: 7
POTASSIUM SERPL-SCNC: 5.1 MMOL/L (ref 3.5–5.1)
PROTEIN, POC: NORMAL
SODIUM BLD-SCNC: 139 MMOL/L (ref 136–145)
SPECIFIC GRAVITY, POC: 1.02
TRIGL SERPL-MCNC: 211 MG/DL (ref 0–150)
UROBILINOGEN, POC: 0.2
VLDLC SERPL CALC-MCNC: 42 MG/DL

## 2019-03-04 PROCEDURE — 93000 ELECTROCARDIOGRAM COMPLETE: CPT | Performed by: FAMILY MEDICINE

## 2019-03-04 PROCEDURE — 4040F PNEUMOC VAC/ADMIN/RCVD: CPT | Performed by: FAMILY MEDICINE

## 2019-03-04 PROCEDURE — G8484 FLU IMMUNIZE NO ADMIN: HCPCS | Performed by: FAMILY MEDICINE

## 2019-03-04 PROCEDURE — 1101F PT FALLS ASSESS-DOCD LE1/YR: CPT | Performed by: FAMILY MEDICINE

## 2019-03-04 PROCEDURE — 36415 COLL VENOUS BLD VENIPUNCTURE: CPT | Performed by: FAMILY MEDICINE

## 2019-03-04 PROCEDURE — 81002 URINALYSIS NONAUTO W/O SCOPE: CPT | Performed by: FAMILY MEDICINE

## 2019-03-04 PROCEDURE — 4004F PT TOBACCO SCREEN RCVD TLK: CPT | Performed by: FAMILY MEDICINE

## 2019-03-04 PROCEDURE — 3017F COLORECTAL CA SCREEN DOC REV: CPT | Performed by: FAMILY MEDICINE

## 2019-03-04 PROCEDURE — 1123F ACP DISCUSS/DSCN MKR DOCD: CPT | Performed by: FAMILY MEDICINE

## 2019-03-04 PROCEDURE — G8419 CALC BMI OUT NRM PARAM NOF/U: HCPCS | Performed by: FAMILY MEDICINE

## 2019-03-04 PROCEDURE — 99214 OFFICE O/P EST MOD 30 MIN: CPT | Performed by: FAMILY MEDICINE

## 2019-03-04 PROCEDURE — G8427 DOCREV CUR MEDS BY ELIG CLIN: HCPCS | Performed by: FAMILY MEDICINE

## 2019-03-04 RX ORDER — SIMVASTATIN 20 MG
20 TABLET ORAL NIGHTLY
COMMUNITY
Start: 2015-04-09 | End: 2019-04-10

## 2019-03-04 ASSESSMENT — ENCOUNTER SYMPTOMS
SHORTNESS OF BREATH: 0
WHEEZING: 0
CHEST TIGHTNESS: 0
CONSTIPATION: 0
DIARRHEA: 0
ABDOMINAL PAIN: 0
SORE THROAT: 0
BLOOD IN STOOL: 0
TROUBLE SWALLOWING: 0

## 2019-03-05 DIAGNOSIS — S46.011A ROTATOR CUFF STRAIN, RIGHT, INITIAL ENCOUNTER: ICD-10-CM

## 2019-03-05 RX ORDER — NAPROXEN 500 MG/1
TABLET ORAL
Qty: 60 TABLET | Refills: 0 | Status: ON HOLD | OUTPATIENT
Start: 2019-03-05 | End: 2019-03-08 | Stop reason: HOSPADM

## 2019-03-07 ENCOUNTER — ANESTHESIA EVENT (OUTPATIENT)
Dept: OPERATING ROOM | Age: 69
End: 2019-03-07
Payer: MEDICARE

## 2019-03-08 ENCOUNTER — HOSPITAL ENCOUNTER (OUTPATIENT)
Age: 69
Setting detail: OUTPATIENT SURGERY
Discharge: HOME | End: 2019-03-08
Attending: ORTHOPAEDIC SURGERY | Admitting: ORTHOPAEDIC SURGERY
Payer: MEDICARE

## 2019-03-08 ENCOUNTER — ANESTHESIA (OUTPATIENT)
Dept: OPERATING ROOM | Age: 69
End: 2019-03-08
Payer: MEDICARE

## 2019-03-08 VITALS
RESPIRATION RATE: 14 BRPM | TEMPERATURE: 95.5 F | DIASTOLIC BLOOD PRESSURE: 65 MMHG | OXYGEN SATURATION: 100 % | SYSTOLIC BLOOD PRESSURE: 98 MMHG

## 2019-03-08 VITALS
TEMPERATURE: 97.7 F | OXYGEN SATURATION: 96 % | HEIGHT: 76 IN | RESPIRATION RATE: 14 BRPM | HEART RATE: 60 BPM | SYSTOLIC BLOOD PRESSURE: 120 MMHG | BODY MASS INDEX: 26.43 KG/M2 | WEIGHT: 217.06 LBS | DIASTOLIC BLOOD PRESSURE: 75 MMHG

## 2019-03-08 DIAGNOSIS — M75.121 COMPLETE TEAR OF RIGHT ROTATOR CUFF: Primary | ICD-10-CM

## 2019-03-08 PROCEDURE — 6360000002 HC RX W HCPCS: Performed by: NURSE ANESTHETIST, CERTIFIED REGISTERED

## 2019-03-08 PROCEDURE — 2580000003 HC RX 258: Performed by: ANESTHESIOLOGY

## 2019-03-08 PROCEDURE — 2500000003 HC RX 250 WO HCPCS: Performed by: NURSE ANESTHETIST, CERTIFIED REGISTERED

## 2019-03-08 PROCEDURE — L3809 WHFO W/O JOINTS PRE OTS: HCPCS | Performed by: ORTHOPAEDIC SURGERY

## 2019-03-08 PROCEDURE — 2720000010 HC SURG SUPPLY STERILE: Performed by: ORTHOPAEDIC SURGERY

## 2019-03-08 PROCEDURE — 29827 SHO ARTHRS SRG RT8TR CUF RPR: CPT | Performed by: ORTHOPAEDIC SURGERY

## 2019-03-08 PROCEDURE — C1713 ANCHOR/SCREW BN/BN,TIS/BN: HCPCS | Performed by: ORTHOPAEDIC SURGERY

## 2019-03-08 PROCEDURE — L3650 SO 8 ABD RESTRAINT PRE OTS: HCPCS | Performed by: ORTHOPAEDIC SURGERY

## 2019-03-08 PROCEDURE — 7100000011 HC PHASE II RECOVERY - ADDTL 15 MIN: Performed by: ORTHOPAEDIC SURGERY

## 2019-03-08 PROCEDURE — 2500000003 HC RX 250 WO HCPCS

## 2019-03-08 PROCEDURE — 3700000000 HC ANESTHESIA ATTENDED CARE: Performed by: ORTHOPAEDIC SURGERY

## 2019-03-08 PROCEDURE — 2580000003 HC RX 258: Performed by: ORTHOPAEDIC SURGERY

## 2019-03-08 PROCEDURE — 3700000001 HC ADD 15 MINUTES (ANESTHESIA): Performed by: ORTHOPAEDIC SURGERY

## 2019-03-08 PROCEDURE — 29826 SHO ARTHRS SRG DECOMPRESSION: CPT | Performed by: ORTHOPAEDIC SURGERY

## 2019-03-08 PROCEDURE — 7100000001 HC PACU RECOVERY - ADDTL 15 MIN: Performed by: ORTHOPAEDIC SURGERY

## 2019-03-08 PROCEDURE — 3600000004 HC SURGERY LEVEL 4 BASE: Performed by: ORTHOPAEDIC SURGERY

## 2019-03-08 PROCEDURE — 3600000014 HC SURGERY LEVEL 4 ADDTL 15MIN: Performed by: ORTHOPAEDIC SURGERY

## 2019-03-08 PROCEDURE — 7100000010 HC PHASE II RECOVERY - FIRST 15 MIN: Performed by: ORTHOPAEDIC SURGERY

## 2019-03-08 PROCEDURE — 2709999900 HC NON-CHARGEABLE SUPPLY: Performed by: ORTHOPAEDIC SURGERY

## 2019-03-08 PROCEDURE — 6360000002 HC RX W HCPCS

## 2019-03-08 PROCEDURE — 7100000000 HC PACU RECOVERY - FIRST 15 MIN: Performed by: ORTHOPAEDIC SURGERY

## 2019-03-08 DEVICE — SYSTEM IMPL L24MM DIA5.5MM BIOCOMP INCL 2 SWIVELOCK SP FOR: Type: IMPLANTABLE DEVICE | Site: SHOULDER | Status: FUNCTIONAL

## 2019-03-08 RX ORDER — FENTANYL CITRATE 50 UG/ML
50 INJECTION, SOLUTION INTRAMUSCULAR; INTRAVENOUS EVERY 5 MIN PRN
Status: DISCONTINUED | OUTPATIENT
Start: 2019-03-08 | End: 2019-03-08 | Stop reason: HOSPADM

## 2019-03-08 RX ORDER — FENTANYL CITRATE 50 UG/ML
INJECTION, SOLUTION INTRAMUSCULAR; INTRAVENOUS PRN
Status: DISCONTINUED | OUTPATIENT
Start: 2019-03-08 | End: 2019-03-08 | Stop reason: SDUPTHER

## 2019-03-08 RX ORDER — SODIUM CHLORIDE 0.9 % (FLUSH) 0.9 %
10 SYRINGE (ML) INJECTION EVERY 12 HOURS SCHEDULED
Status: DISCONTINUED | OUTPATIENT
Start: 2019-03-08 | End: 2019-03-08 | Stop reason: HOSPADM

## 2019-03-08 RX ORDER — MIDAZOLAM HYDROCHLORIDE 1 MG/ML
INJECTION INTRAMUSCULAR; INTRAVENOUS PRN
Status: DISCONTINUED | OUTPATIENT
Start: 2019-03-08 | End: 2019-03-08 | Stop reason: SDUPTHER

## 2019-03-08 RX ORDER — DOCUSATE SODIUM 100 MG/1
100 CAPSULE, LIQUID FILLED ORAL 2 TIMES DAILY
Qty: 60 CAPSULE | Refills: 0 | Status: SHIPPED | OUTPATIENT
Start: 2019-03-08 | End: 2019-04-07

## 2019-03-08 RX ORDER — MAGNESIUM HYDROXIDE 1200 MG/15ML
LIQUID ORAL CONTINUOUS PRN
Status: COMPLETED | OUTPATIENT
Start: 2019-03-08 | End: 2019-03-08

## 2019-03-08 RX ORDER — POVIDONE-IODINE 10 MG/G
OINTMENT TOPICAL
Status: COMPLETED | OUTPATIENT
Start: 2019-03-08 | End: 2019-03-08

## 2019-03-08 RX ORDER — PROMETHAZINE HYDROCHLORIDE 25 MG/1
25 TABLET ORAL EVERY 6 HOURS PRN
Qty: 5 TABLET | Refills: 0 | Status: SHIPPED | OUTPATIENT
Start: 2019-03-08 | End: 2019-04-22 | Stop reason: ALTCHOICE

## 2019-03-08 RX ORDER — DEXAMETHASONE SODIUM PHOSPHATE 4 MG/ML
INJECTION, SOLUTION INTRA-ARTICULAR; INTRALESIONAL; INTRAMUSCULAR; INTRAVENOUS; SOFT TISSUE PRN
Status: DISCONTINUED | OUTPATIENT
Start: 2019-03-08 | End: 2019-03-08 | Stop reason: SDUPTHER

## 2019-03-08 RX ORDER — ONDANSETRON 2 MG/ML
4 INJECTION INTRAMUSCULAR; INTRAVENOUS
Status: DISCONTINUED | OUTPATIENT
Start: 2019-03-08 | End: 2019-03-08 | Stop reason: HOSPADM

## 2019-03-08 RX ORDER — PROPOFOL 10 MG/ML
INJECTION, EMULSION INTRAVENOUS PRN
Status: DISCONTINUED | OUTPATIENT
Start: 2019-03-08 | End: 2019-03-08 | Stop reason: SDUPTHER

## 2019-03-08 RX ORDER — SUCCINYLCHOLINE/SOD CL,ISO/PF 200MG/10ML
SYRINGE (ML) INTRAVENOUS PRN
Status: DISCONTINUED | OUTPATIENT
Start: 2019-03-08 | End: 2019-03-08 | Stop reason: SDUPTHER

## 2019-03-08 RX ORDER — OXYCODONE HYDROCHLORIDE AND ACETAMINOPHEN 5; 325 MG/1; MG/1
1 TABLET ORAL EVERY 4 HOURS PRN
Qty: 40 TABLET | Refills: 0 | Status: SHIPPED | OUTPATIENT
Start: 2019-03-08 | End: 2019-03-15

## 2019-03-08 RX ORDER — SODIUM CHLORIDE 9 MG/ML
INJECTION, SOLUTION INTRAVENOUS CONTINUOUS
Status: DISCONTINUED | OUTPATIENT
Start: 2019-03-08 | End: 2019-03-08 | Stop reason: HOSPADM

## 2019-03-08 RX ORDER — SODIUM CHLORIDE 0.9 % (FLUSH) 0.9 %
10 SYRINGE (ML) INJECTION PRN
Status: DISCONTINUED | OUTPATIENT
Start: 2019-03-08 | End: 2019-03-08 | Stop reason: HOSPADM

## 2019-03-08 RX ORDER — LIDOCAINE HYDROCHLORIDE 20 MG/ML
INJECTION, SOLUTION EPIDURAL; INFILTRATION; INTRACAUDAL; PERINEURAL PRN
Status: DISCONTINUED | OUTPATIENT
Start: 2019-03-08 | End: 2019-03-08 | Stop reason: SDUPTHER

## 2019-03-08 RX ORDER — GLYCOPYRROLATE 0.2 MG/ML
INJECTION INTRAMUSCULAR; INTRAVENOUS PRN
Status: DISCONTINUED | OUTPATIENT
Start: 2019-03-08 | End: 2019-03-08 | Stop reason: SDUPTHER

## 2019-03-08 RX ORDER — FENTANYL CITRATE 50 UG/ML
25 INJECTION, SOLUTION INTRAMUSCULAR; INTRAVENOUS EVERY 5 MIN PRN
Status: DISCONTINUED | OUTPATIENT
Start: 2019-03-08 | End: 2019-03-08 | Stop reason: HOSPADM

## 2019-03-08 RX ORDER — ONDANSETRON 2 MG/ML
INJECTION INTRAMUSCULAR; INTRAVENOUS PRN
Status: DISCONTINUED | OUTPATIENT
Start: 2019-03-08 | End: 2019-03-08 | Stop reason: SDUPTHER

## 2019-03-08 RX ADMIN — DEXAMETHASONE SODIUM PHOSPHATE 8 MG: 4 INJECTION, SOLUTION INTRAMUSCULAR; INTRAVENOUS at 11:19

## 2019-03-08 RX ADMIN — Medication 160 MG: at 11:13

## 2019-03-08 RX ADMIN — MIDAZOLAM 2 MG: 1 INJECTION INTRAMUSCULAR; INTRAVENOUS at 11:08

## 2019-03-08 RX ADMIN — LIDOCAINE HYDROCHLORIDE 100 MG: 20 INJECTION, SOLUTION EPIDURAL; INFILTRATION; INTRACAUDAL; PERINEURAL at 11:13

## 2019-03-08 RX ADMIN — GLYCOPYRROLATE 0.2 MG: 0.2 INJECTION, SOLUTION INTRAMUSCULAR; INTRAVENOUS at 11:19

## 2019-03-08 RX ADMIN — FENTANYL CITRATE 100 MCG: 50 INJECTION INTRAMUSCULAR; INTRAVENOUS at 11:08

## 2019-03-08 RX ADMIN — ONDANSETRON 4 MG: 2 INJECTION INTRAMUSCULAR; INTRAVENOUS at 11:19

## 2019-03-08 RX ADMIN — SODIUM CHLORIDE: 9 INJECTION, SOLUTION INTRAVENOUS at 08:50

## 2019-03-08 RX ADMIN — SODIUM CHLORIDE: 9 INJECTION, SOLUTION INTRAVENOUS at 11:42

## 2019-03-08 RX ADMIN — PROPOFOL 200 MG: 10 INJECTION, EMULSION INTRAVENOUS at 11:13

## 2019-03-08 ASSESSMENT — PULMONARY FUNCTION TESTS
PIF_VALUE: 17
PIF_VALUE: 0
PIF_VALUE: 3
PIF_VALUE: 15
PIF_VALUE: 11
PIF_VALUE: 16
PIF_VALUE: 0
PIF_VALUE: 16
PIF_VALUE: 15
PIF_VALUE: 15
PIF_VALUE: 16
PIF_VALUE: 16
PIF_VALUE: 1
PIF_VALUE: 15
PIF_VALUE: 1
PIF_VALUE: 15
PIF_VALUE: 15
PIF_VALUE: 14
PIF_VALUE: 15
PIF_VALUE: 16
PIF_VALUE: 3
PIF_VALUE: 15
PIF_VALUE: 15
PIF_VALUE: 12
PIF_VALUE: 15
PIF_VALUE: 15
PIF_VALUE: 16
PIF_VALUE: 15
PIF_VALUE: 15
PIF_VALUE: 14
PIF_VALUE: 12
PIF_VALUE: 0
PIF_VALUE: 15
PIF_VALUE: 14
PIF_VALUE: 17
PIF_VALUE: 14
PIF_VALUE: 15
PIF_VALUE: 15
PIF_VALUE: 12
PIF_VALUE: 18
PIF_VALUE: 15
PIF_VALUE: 12
PIF_VALUE: 12
PIF_VALUE: 15
PIF_VALUE: 12
PIF_VALUE: 15
PIF_VALUE: 15
PIF_VALUE: 16
PIF_VALUE: 15
PIF_VALUE: 12
PIF_VALUE: 15
PIF_VALUE: 16
PIF_VALUE: 15
PIF_VALUE: 15
PIF_VALUE: 2
PIF_VALUE: 15
PIF_VALUE: 15
PIF_VALUE: 12
PIF_VALUE: 17
PIF_VALUE: 12
PIF_VALUE: 17
PIF_VALUE: 15
PIF_VALUE: 13
PIF_VALUE: 15

## 2019-03-08 ASSESSMENT — PAIN SCALES - GENERAL
PAINLEVEL_OUTOF10: 0

## 2019-03-08 ASSESSMENT — PAIN - FUNCTIONAL ASSESSMENT: PAIN_FUNCTIONAL_ASSESSMENT: 0-10

## 2019-03-11 ENCOUNTER — OFFICE VISIT (OUTPATIENT)
Dept: ORTHOPEDIC SURGERY | Age: 69
End: 2019-03-11

## 2019-03-11 VITALS — WEIGHT: 217.15 LBS | RESPIRATION RATE: 17 BRPM | HEART RATE: 74 BPM | HEIGHT: 76 IN | BODY MASS INDEX: 26.44 KG/M2

## 2019-03-11 DIAGNOSIS — M75.121 COMPLETE TEAR OF RIGHT ROTATOR CUFF: Primary | ICD-10-CM

## 2019-03-11 PROCEDURE — 99024 POSTOP FOLLOW-UP VISIT: CPT | Performed by: ORTHOPAEDIC SURGERY

## 2019-03-15 ENCOUNTER — APPOINTMENT (OUTPATIENT)
Dept: PHYSICAL THERAPY | Age: 69
End: 2019-03-15
Payer: MEDICARE

## 2019-03-20 ENCOUNTER — APPOINTMENT (OUTPATIENT)
Dept: PHYSICAL THERAPY | Age: 69
End: 2019-03-20
Payer: MEDICARE

## 2019-03-22 ENCOUNTER — APPOINTMENT (OUTPATIENT)
Dept: PHYSICAL THERAPY | Age: 69
End: 2019-03-22
Payer: MEDICARE

## 2019-03-25 ENCOUNTER — OFFICE VISIT (OUTPATIENT)
Dept: ORTHOPEDIC SURGERY | Age: 69
End: 2019-03-25

## 2019-03-25 VITALS — RESPIRATION RATE: 16 BRPM | WEIGHT: 217.15 LBS | HEIGHT: 76 IN | BODY MASS INDEX: 26.44 KG/M2

## 2019-03-25 DIAGNOSIS — M75.121 COMPLETE TEAR OF RIGHT ROTATOR CUFF: Primary | ICD-10-CM

## 2019-03-25 PROCEDURE — 99024 POSTOP FOLLOW-UP VISIT: CPT | Performed by: ORTHOPAEDIC SURGERY

## 2019-03-27 ENCOUNTER — APPOINTMENT (OUTPATIENT)
Dept: PHYSICAL THERAPY | Age: 69
End: 2019-03-27
Payer: MEDICARE

## 2019-03-29 ENCOUNTER — APPOINTMENT (OUTPATIENT)
Dept: PHYSICAL THERAPY | Age: 69
End: 2019-03-29
Payer: MEDICARE

## 2019-04-09 DIAGNOSIS — E78.2 MIXED HYPERLIPIDEMIA: ICD-10-CM

## 2019-04-10 RX ORDER — SIMVASTATIN 20 MG
TABLET ORAL
Qty: 90 TABLET | Refills: 1 | Status: SHIPPED | OUTPATIENT
Start: 2019-04-10 | End: 2019-07-08

## 2019-04-22 ENCOUNTER — OFFICE VISIT (OUTPATIENT)
Dept: ORTHOPEDIC SURGERY | Age: 69
End: 2019-04-22

## 2019-04-22 VITALS — RESPIRATION RATE: 17 BRPM | HEIGHT: 76 IN | WEIGHT: 217.15 LBS | BODY MASS INDEX: 26.44 KG/M2

## 2019-04-22 DIAGNOSIS — M75.121 COMPLETE TEAR OF RIGHT ROTATOR CUFF: Primary | ICD-10-CM

## 2019-04-22 PROCEDURE — 99024 POSTOP FOLLOW-UP VISIT: CPT | Performed by: ORTHOPAEDIC SURGERY

## 2019-04-23 ENCOUNTER — HOSPITAL ENCOUNTER (OUTPATIENT)
Dept: PHYSICAL THERAPY | Age: 69
Setting detail: THERAPIES SERIES
Discharge: HOME | End: 2019-04-23
Payer: MEDICARE

## 2019-04-23 PROCEDURE — 97161 PT EVAL LOW COMPLEX 20 MIN: CPT

## 2019-04-23 PROCEDURE — G0283 ELEC STIM OTHER THAN WOUND: HCPCS

## 2019-04-23 PROCEDURE — 97110 THERAPEUTIC EXERCISES: CPT

## 2019-04-23 PROCEDURE — 97530 THERAPEUTIC ACTIVITIES: CPT

## 2019-04-23 ASSESSMENT — PAIN DESCRIPTION - ORIENTATION: ORIENTATION: RIGHT

## 2019-04-23 ASSESSMENT — PAIN DESCRIPTION - LOCATION: LOCATION: SHOULDER

## 2019-04-23 ASSESSMENT — PAIN DESCRIPTION - DESCRIPTORS: DESCRIPTORS: ACHING;SORE

## 2019-04-23 ASSESSMENT — PAIN - FUNCTIONAL ASSESSMENT: PAIN_FUNCTIONAL_ASSESSMENT: PREVENTS OR INTERFERES WITH ALL ACTIVE AND SOME PASSIVE ACTIVITIES

## 2019-04-23 NOTE — PLAN OF CARE
Outpatient Physical Therapy  [x] [facility]    Phone: [phone]   Fax: [phone]   [] [facility]  Phone: [phone]              Fax: [phone]  [] [facility]   Phone: [phone]   Fax: [phone]     To: Referring Practitioner: Bella Contreras MD      Patient: Ani Sanders   : 1950   MRN: [id number]  Evaluation Date: 2019      Diagnosis Information:  · Diagnosis: Complete tear of R RTC    · Treatment Diagnosis: Decreased mobility and strength     Physical Therapy Certification/Re-Certification Form  Dear Venus Rouse,   The following patient has been evaluated for physical therapy services and for therapy to continue, Medicare requires monthly physician review of the treatment plan. Please review the attached evaluation and/or summary of the patient's plan of care, and verify that you agree therapy should continue by signing the attached document and sending it back to our office. Plan of Care/Treatment to date:  [x] Therapeutic Exercise    [x] Modalities:  [x] Therapeutic Activity     [x] Ultrasound  [x] Electrical Stimulation  [] Gait Training      [] Cervical Traction [] Lumbar Traction  [] Neuromuscular Re-education    [x] Cold/hotpack [] Iontophoresis   [x] Instruction in HEP     Other:  [x] Manual Therapy      []             [] Aquatic Therapy      []           ? Frequency/Duration:  # Days per week: [] 1 day # Weeks: [] 1 week [] 5 weeks     [] 2 days? [] 2 weeks [x] 6 weeks     [x] 3 days   [] 3 weeks [] 7 weeks     [] 4 days   [] 4 weeks [] 8 weeks    Rehab Potential: [x] Excellent [] Good [] Fair  [] Poor       Electronically signed by:  Tammy Goff, [id number] [address] [facility]      If you have any questions or concerns, please don't hesitate to call.   Thank you for your referral.      Physician Signature:________________________________Date:__________________  By signing above, therapists plan is approved by physician

## 2019-04-23 NOTE — PROGRESS NOTES
Physical Therapy  Initial Assessment  Date: 2019  Patient Name: Baltazar Hitchcock  MRN: [id number]  : 1950     Treatment Diagnosis: Decreased mobility and strength    Restrictions  Restrictions/Precautions  Restrictions/Precautions: Fall Risk(low: pt reports no history of frequent falls. )    Subjective   General  Chart Reviewed: Yes  Patient assessed for rehabilitation services?: Yes  Additional Pertinent Hx: Afib  Referring Practitioner: Adele Mcfarland MD  Referral Date : 19  Diagnosis: Complete tear of R RTC   PT Visit Information  Onset Date: 19  PT Insurance Information: Medicare   Total # of Visits Approved: 18  Total # of Visits to Date: 1  Subjective  Subjective: Patient underwent R shoulder scope with SAD and RTC repair on 3/8/19. Has been wearing sling at all times as instructed. Took abduction pillow out yesterday as instructed by MD. OP report notes large full-thickness retracted tear of the supraspinatus. Continues to sleep in a recliner for the majority of the night. Pain Screening  Patient Currently in Pain: Yes  Pain Assessment  Pain Assessment: 0-10  Pain Level: (pain 1/10 at rest, 6/10 with activity)  Patient's Stated Pain Goal: No pain(Pt goal: \"Return to hobbies and activities. \")  Pain Location: Shoulder  Pain Orientation: Right  Pain Descriptors: Aching; Sore  Functional Pain Assessment: Prevents or interferes with all active and some passive activities  Non-Pharmaceutical Pain Intervention(s): Rest;Cold applied  Vital Signs  Patient Currently in Pain: Yes    Objective     Observation/Palpation  Posture: (Patient holds arm in protected position.)  Palpation: mild TTP R AC joint area  Observation: Patient to PT wearing sling with arm in protected position.    Scar: incision portals healed with no signs of infection    PROM RUE (degrees)  RUE PROM: Exceptions  R Shoulder Flex  0-180: 40  R Shoulder ABduction 0-180: 60   R Shoulder Int Rotation  0-70: 30 at 45 degrees of abduction  R Shoulder Ext Rotation  0-90: 15 at 45 degrees of abduction  R Elbow Flex/Ext 0-145: WNL    Strength RUE  Strength RUE: (Not tested due to MD protocol)  Strength LUE  Strength LUE: WFL     Additional Measures  Flexibility: Pec=min tightness, R UT=min tightness  Sensation  Overall Sensation Status: WNL    Assessment   Conditions Requiring Skilled Therapeutic Intervention  Body structures, Functions, Activity limitations: Decreased functional mobility ; Decreased high-level IADLs;Decreased ADL status; Decreased ROM; Decreased strength; Increased Pain  Assessment: Patient reports to PT with s/s consistent with post op RTC repair recovery which are limiting functional activities and would benefit from skilled PT at this time. (Prior Level of Function: able to fish and hunt)  Treatment Diagnosis: Decreased mobility and strength  Prognosis: Excellent  Decision Making: Low Complexity  REQUIRES PT FOLLOW UP: Yes  Activity Tolerance  Activity Tolerance: Patient limited by pain         Plan   Plan  Times per week: 3x/week  Plan weeks: 6 weeks  Current Treatment Recommendations: Strengthening, ROM, Manual Therapy - Joint Manipulation, Pain Management, Modalities, Home Exercise Program, Manual Therapy - Soft Tissue Mobilization    OutComes Score  QuickDASH Total Score: 28       QuickDASH Disability/Symptom Score : 38.64 %    Goals  Short term goals  Time Frame for Short term goals: 3 weeks  Short term goal 1: Patient will be independent with HEP for prevention of reinjury. Short term goal 2: Patient will report 30% improvement with pain for ease with ADLs. Short term goal 3: Patient will tolerate 10 mins of AAROM exercises without increased pain for ease with dressing. Long term goals  Time Frame for Long term goals : at discharge  Long term goal 1: Increase R shoulder PROM to flex/abd=160, IR=70, and ER=70 for ease with dressing. Long term goal 2: Increase UE strength to 4+/5 for ease with lifting.   Long term goal 3: Patient will report 70% improvement for ease with ADLs.   Patient Goals   Patient goals : \"return to hobbies and activities\"       Therapy Time   Individual   Time In 0800   Time Out 0900   Minutes 60   Timed Code Treatment Minutes: Tracee [address] Vivian, [id number] Jacky Acosta

## 2019-04-23 NOTE — FLOWSHEET NOTE
lifting. Long term goal 3: Patient will report 70% improvement for ease with ADLs.      Patient Requires Follow-up: [x] Yes  [] No    Plan:   [] Continue per plan of care [] Alter current plan (see comments)  [x] Plan of care initiated [] Hold pending MD visit [] Discharge    Plan for Next Session:  Add above as stated    Electronically signed by:  Jay Schmitt, [id number] Jacky Ac[address]

## 2019-04-25 ENCOUNTER — HOSPITAL ENCOUNTER (OUTPATIENT)
Dept: PHYSICAL THERAPY | Age: 69
Setting detail: THERAPIES SERIES
Discharge: HOME | End: 2019-04-25
Payer: MEDICARE

## 2019-04-25 PROCEDURE — 97110 THERAPEUTIC EXERCISES: CPT

## 2019-04-25 NOTE — FLOWSHEET NOTE
Physical Therapy Daily Treatment Note  Date:  2019    Patient Name:  [employer] [place] [employer]    :  1950  MRN: [id number]    Restrictions/Precautions: Fall Risk(low: pt reports no history of frequent falls. )    Pertinent Medical History:  Afib    Medical/Treatment Diagnosis Information:  · Diagnosis: Complete tear of R RTC   · Treatment Diagnosis: Decreased mobility and strength    Insurance/Certification information:  PT Insurance Information: Medicare   Physician Information:  Referring Practitioner: Viktoria Murray MD  Plan of care signed (Y/N): sent for cosign  (received)    Visit# / total visits:    Pain level: 1.5/10     Functional Outcomes Measure: at eval  Test: quick dash   Score:  28    Progress Note: []  Yes  [x]  No  Next due by: Visit #10      History of Injury: Patient underwent R shoulder scope with SAD and RTC repair on 3/8/19. Has been wearing sling at all times as instructed. Took abduction pillow out yesterday as instructed by MD. OP report notes large full-thickness retracted tear of the supraspinatus. Continues to sleep in a recliner for the majority of the night. Subjective:  Little sore after PROM last visit but not bad. Arm sore this morning from sleeping on his shoulder wrong last night. Objective:  Observation:   · Palpation: mild TTP R AC joint area  · Observation: Patient to PT wearing sling with arm in protected position.    · Scar: incision portals healed with no signs of infection  Test measurements:    ROM:  Date      Shldr flexion    Shldr abd  Shldr IR         Shldr ER   A P A P A P A P   Eval   40  60  30 at 45 degrees abd  15 at 45 degrees abd                 Strength:  Date Shoulder flexion Shoulder abduction Shoulder IR Shoulder  ER Bicep   Eval  NT NT  NT  NT NT           NT= not tested per MD protocol      Exercises:R shoulder scope with SAD and RTC repair on 3/8/19   Exercise/Equipment Resistance/Repetitions Other comments        Ruperto f/almaz, s/s, cw/ccw 10x each 4/12-4/19: P/AAROM  5/3-5/17: Begin AROM, gentle strengthening, isometrics  5/31-6/14: initiate full strengthening   scap squeeze 10x    shrugs 10x    Bicep curls 10x    Table slides flex                     scaption                     ER Add  Add  Add         Supine cane ER 10x              L SL scap PROM  10x each all directions    Shoulder PROM 4-way 10x each  Per Protocol:   Flex to 150, abd to 70, ER to tolerance with 45 degrees abd         CP x10 mins R shoulder Seated  4/25: pt declined estim today     Other Therapeutic Activities:  Pt was educated on PT POC, Diagnosis, Prognosis, pathomechanics as well as frequency and duration of scheduling future physical therapy appointments. Time was also taken on this day to answer all patient questions and participation in PT. Reviewed appointment policy in detail with patient and patient verbalized understanding. Discussed with patient at length MD restriction of PROM only. Patient instructed to avoid yardwork activities such as driving lawnmower, cutting down trees. Patient voiced understanding. Home Exercise Program:  Patient instructed in the following for HEP: pendulums, shrugs, scap squeeze, codmans, supine cane ER. Patient verbalized/demonstrated understanding and was issued written handout. Timed Code Treatment Minutes:  25    Total Treatment Minutes:  35    Assessment:  [] Patient tolerated treatment well [] Patient limited by fatigue  [x] Patient limited by pain  [] Patient limited by other medical complications  [] Other:     Prognosis: [x] Good [] Fair  [] Poor    Goals:    Short term goals  Time Frame for Short term goals: 3 weeks  Short term goal 1: Patient will be independent with HEP for prevention of reinjury. Short term goal 2: Patient will report 30% improvement with pain for ease with ADLs. Short term goal 3: Patient will tolerate 10 mins of AAROM exercises without increased pain for ease with dressing.        Long term goals  Time Frame for Long term goals : at discharge  Long term goal 1: Increase R shoulder PROM to flex/abd=160, IR=70, and ER=70 for ease with dressing. Long term goal 2: Increase UE strength to 4+/5 for ease with lifting. Long term goal 3: Patient will report 70% improvement for ease with ADLs.      Patient Requires Follow-up: [x] Yes  [] No    Plan:   [x] Continue per plan of care [] Alter current plan (see comments)  [] Plan of care initiated [] Hold pending MD visit [] Discharge    Plan for Next Session:  Add above as stated    Electronically signed by:  Nilton Castelan, [id number] Jacky Acosta

## 2019-04-29 ENCOUNTER — HOSPITAL ENCOUNTER (OUTPATIENT)
Dept: PHYSICAL THERAPY | Age: 69
Setting detail: THERAPIES SERIES
Discharge: HOME | End: 2019-04-29
Payer: MEDICARE

## 2019-04-29 PROCEDURE — 97110 THERAPEUTIC EXERCISES: CPT

## 2019-04-29 NOTE — FLOWSHEET NOTE
P/AAROM  5/3-5/17: Begin AROM, gentle strengthening, isometrics  5/31-6/14: initiate full strengthening   scap squeeze 10x    shrugs 10x    Bicep curls 10x    Table slides flex                     scaption                     ER [id number]  [id number]  [id number]         Supine cane ER 10x              L SL scap PROM  10x each all directions    Shoulder PROM 4-way 10x each  Per Protocol:   Flex to 150, abd to 70, ER to tolerance with 45 degrees abd         CP x10 mins R shoulder Seated  4/25: pt declined estim today     Other Therapeutic Activities:  Pt was educated on PT POC, Diagnosis, Prognosis, pathomechanics as well as frequency and duration of scheduling future physical therapy appointments. Time was also taken on this day to answer all patient questions and participation in PT. Reviewed appointment policy in detail with patient and patient verbalized understanding. Discussed with patient at length MD restriction of PROM only. Patient instructed to avoid yardwork activities such as driving lawnmower, cutting down trees. Patient voiced understanding. Home Exercise Program:  Patient instructed in the following for HEP: pendulums, shrugs, scap squeeze, codmans, supine cane ER. Patient verbalized/demonstrated understanding and was issued written handout. Timed Code Treatment Minutes:  25    Total Treatment Minutes:  35    Assessment:  [] Patient tolerated treatment well [] Patient limited by fatigue  [x] Patient limited by pain  [] Patient limited by other medical complications  [] Other:     Prognosis: [x] Good [] Fair  [] Poor    Goals:    Short term goals  Time Frame for Short term goals: 3 weeks  Short term goal 1: Patient will be independent with HEP for prevention of reinjury. Short term goal 2: Patient will report 30% improvement with pain for ease with ADLs. Short term goal 3: Patient will tolerate 10 mins of AAROM exercises without increased pain for ease with dressing.        Long term goals  Time Frame for Long term goals : at discharge  Long term goal 1: Increase R shoulder PROM to flex/abd=160, IR=70, and ER=70 for ease with dressing. Long term goal 2: Increase UE strength to 4+/5 for ease with lifting. Long term goal 3: Patient will report 70% improvement for ease with ADLs.      Patient Requires Follow-up: [x] Yes  [] No    Plan:   [x] Continue per plan of care [] Alter current plan (see comments)  [] Plan of care initiated [] Hold pending MD visit [] Discharge    Plan for Next Session:  Add above as stated    Electronically signed by:  Patsy León, PTA [id number]

## 2019-04-30 ENCOUNTER — HOSPITAL ENCOUNTER (OUTPATIENT)
Dept: PHYSICAL THERAPY | Age: 69
Setting detail: THERAPIES SERIES
Discharge: HOME | End: 2019-04-30
Payer: MEDICARE

## 2019-04-30 PROCEDURE — 97110 THERAPEUTIC EXERCISES: CPT

## 2019-04-30 NOTE — FLOWSHEET NOTE
Physical Therapy Daily Treatment Note  Date:  2019    Patient Name:  Zuleyka Nowak    :  1950  MRN: [id number]    Restrictions/Precautions: Fall Risk(low: pt reports no history of frequent falls. )    Pertinent Medical History:  Afib    Medical/Treatment Diagnosis Information:  · Diagnosis: Complete tear of R RTC   · Treatment Diagnosis: Decreased mobility and strength    Insurance/Certification information:  PT Insurance Information: Medicare   Physician Information:  Referring Practitioner: Maggie Maddox MD  Plan of care signed (Y/N): sent for cosign  (received)    Visit# / total visits:    Pain level: 0/10     Functional Outcomes Measure: at eval  Test: quick dash   Score:  28    Progress Note: []  Yes  [x]  No  Next due by: Visit #10      History of Injury: Patient underwent R shoulder scope with SAD and RTC repair on 3/8/19. Has been wearing sling at all times as instructed. Took abduction pillow out yesterday as instructed by MD. OP report notes large full-thickness retracted tear of the supraspinatus. Continues to sleep in a recliner for the majority of the night. Subjective:  Did well after last session. No new complaints. Objective:  Observation:   · Palpation: mild TTP R AC joint area  · Observation: Patient to PT wearing sling with arm in protected position.    · Scar: incision portals healed with no signs of infection  Test measurements:    ROM:  Date      Shldr flexion    Shldr abd  Shldr IR         Shldr ER   A P A P A P A P   Eval   40  60  30 at 45 degrees abd  15 at 45 degrees abd                 Strength:  Date Shoulder flexion Shoulder abduction Shoulder IR Shoulder  ER Bicep   Eval  NT NT  NT  NT NT           NT= not tested per MD protocol      Exercises:R shoulder scope with SAD and RTC repair on 3/8/19   Exercise/Equipment Resistance/Repetitions Other comments        Codmans f/b, s/s, cw/ccw 10x each -: P/AAROM  5/3-: Begin AROM, gentle Long term goals  Time Frame for Long term goals : at discharge  Long term goal 1: Increase R shoulder PROM to flex/abd=160, IR=70, and ER=70 for ease with dressing. Long term goal 2: Increase UE strength to 4+/5 for ease with lifting. Long term goal 3: Patient will report 70% improvement for ease with ADLs.      Patient Requires Follow-up: [x] Yes  [] No    Plan:   [x] Continue per plan of care [] Alter current plan (see comments)  [] Plan of care initiated [] Hold pending MD visit [] Discharge    Plan for Next Session:  Add above as stated    Electronically signed by:  Keila Fontana, [id number] Jacky Acosta

## 2019-05-02 ENCOUNTER — HOSPITAL ENCOUNTER (OUTPATIENT)
Dept: PHYSICAL THERAPY | Age: 69
Setting detail: THERAPIES SERIES
Discharge: HOME | End: 2019-05-02
Payer: MEDICARE

## 2019-05-02 PROCEDURE — 97110 THERAPEUTIC EXERCISES: CPT

## 2019-05-02 NOTE — FLOWSHEET NOTE
Physical Therapy Daily Treatment Note  Date:  2019    Patient Name:  Edd Wallace    :  1950  MRN: [id number]    Restrictions/Precautions: Fall Risk(low: pt reports no history of frequent falls. )    Pertinent Medical History:  Afib    Medical/Treatment Diagnosis Information:  · Diagnosis: Complete tear of R RTC   · Treatment Diagnosis: Decreased mobility and strength    Insurance/Certification information:  PT Insurance Information: Medicare   Physician Information:  Referring Practitioner: Saray Manriquez MD  Plan of care signed (Y/N): sent for cosign  (received)    Visit# / total visits:    Pain level: 2/10     Functional Outcomes Measure: at eval  Test: quick dash   Score:  28    Progress Note: []  Yes  [x]  No  Next due by: Visit #10      History of Injury: Patient underwent R shoulder scope with SAD and RTC repair on 3/8/19. Has been wearing sling at all times as instructed. Took abduction pillow out yesterday as instructed by MD. OP report notes large full-thickness retracted tear of the supraspinatus. Continues to sleep in a recliner for the majority of the night. Subjective: notes some pain after reaching and opening refrigerator door with R UE. Objective:  Observation:   · Palpation: mild TTP R AC joint area  · Observation: Patient to PT wearing sling with arm in protected position.    · Scar: incision portals healed with no signs of infection  Test measurements:    ROM:  Date      Shldr flexion    Shldr abd  Shldr IR         Shldr ER   A P A P A P A P   Eval   40  60  30 at 45 degrees abd  15 at 45 degrees abd                 Strength:  Date Shoulder flexion Shoulder abduction Shoulder IR Shoulder  ER Bicep   Eval  NT NT  NT  NT NT           NT= not tested per MD protocol      Exercises:R shoulder scope with SAD and RTC repair on 3/8/19   Exercise/Equipment Resistance/Repetitions Other comments        Codmans f/b, s/s, cw/ccw 10x each -: P/AAROM  5/3-5/17: Begin AROM, gentle strengthening, isometrics  5/31-6/14: initiate full strengthening   Swiss ball flexion                    scaption Add  add AAROM   [place] flex 10x    scap squeeze 20x    shrugs 20x    Bicep curls 10x    Table slides flex                     scaption                     ER 10x 5sec  10x 5sec  10x 5sec         Supine cane ER 10x              L SL scap PROM  10x each all directions    Shoulder PROM 4-way 10x each  Per Protocol:   Flex to 150, abd to 70, ER to tolerance with 45 degrees abd  4/30-constant cueing needed for patient to relax with PROM        CP after exercises x10 mins R shoulder Seated     Other Therapeutic Activities:  Pt was educated on PT POC, Diagnosis, Prognosis, pathomechanics as well as frequency and duration of scheduling future physical therapy appointments. Time was also taken on this day to answer all patient questions and participation in PT. Reviewed appointment policy in detail with patient and patient verbalized understanding. Discussed with patient at length MD restriction of PROM only. Patient instructed to avoid yardwork activities such as driving lawnmower, cutting down trees. Patient voiced understanding. Home Exercise Program:  Patient instructed in the following for HEP: pendulums, shrugs, scap squeeze, codmans, supine cane ER. Patient verbalized/demonstrated understanding and was issued written handout. Timed Code Treatment Minutes:  30    Total Treatment Minutes:  40    Assessment:  [] Patient tolerated treatment well [] Patient limited by fatigue  [x] Patient limited by pain  [] Patient limited by other medical complications  [] Other:     Prognosis: [x] Good [] Fair  [] Poor    Goals:    Short term goals  Time Frame for Short term goals: 3 weeks  Short term goal 1: Patient will be independent with HEP for prevention of reinjury. Short term goal 2: Patient will report 30% improvement with pain for ease with ADLs.    Short term goal 3: Patient will tolerate 10 mins of AAROM exercises without increased pain for ease with dressing. Long term goals  Time Frame for Long term goals : at discharge  Long term goal 1: Increase R shoulder PROM to flex/abd=160, IR=70, and ER=70 for ease with dressing. Long term goal 2: Increase UE strength to 4+/5 for ease with lifting. Long term goal 3: Patient will report 70% improvement for ease with ADLs.      Patient Requires Follow-up: [x] Yes  [] No    Plan:   [x] Continue per plan of care [] Alter current plan (see comments)  [] Plan of care initiated [] Hold pending MD visit [] Discharge    Plan for Next Session:  Add above as stated    Electronically signed by:  Nilton Castelan, [id number] Jacky Acosta

## 2019-05-06 ENCOUNTER — HOSPITAL ENCOUNTER (OUTPATIENT)
Dept: PHYSICAL THERAPY | Age: 69
Setting detail: THERAPIES SERIES
Discharge: HOME | End: 2019-05-06
Payer: MEDICARE

## 2019-05-06 PROCEDURE — 97110 THERAPEUTIC EXERCISES: CPT

## 2019-05-06 NOTE — FLOWSHEET NOTE
Physical Therapy Daily Treatment Note  Date:  2019    Patient Name:  Halle Maloney    :  1950  MRN: [id number]    Restrictions/Precautions: Fall Risk(low: pt reports no history of frequent falls. )    Pertinent Medical History:  Afib    Medical/Treatment Diagnosis Information:  · Diagnosis: Complete tear of R RTC   · Treatment Diagnosis: Decreased mobility and strength    Insurance/Certification information:  PT Insurance Information: Medicare   Physician Information:  Referring Practitioner: Corry Pepper MD  Plan of care signed (Y/N): sent for cosign  (received)    Visit# / total visits:    Pain level: 2/10     Functional Outcomes Measure: at eval  Test: quick dash   Score:  28    Progress Note: []  Yes  [x]  No  Next due by: Visit #10      History of Injury: Patient underwent R shoulder scope with SAD and RTC repair on 3/8/19. Has been wearing sling at all times as instructed. Took abduction pillow out yesterday as instructed by MD. OP report notes large full-thickness retracted tear of the supraspinatus. Continues to sleep in a recliner for the majority of the night. Subjective:   Starting to feel looser. Was able to steady a rifle enough to shoot last week    Objective:  Observation:   · Palpation: mild TTP R AC joint area  · Observation: Patient to PT wearing sling with arm in protected position.    · Scar: incision portals healed with no signs of infection  Test measurements:    ROM:  Date      Shldr flexion    Shldr abd  Shldr IR         Shldr ER   A P A P A P A P   Eval   40  60  30 at 45 degrees abd  15 at 45 degrees abd                 Strength:  Date Shoulder flexion Shoulder abduction Shoulder IR Shoulder  ER Bicep   Eval  NT NT  NT  NT NT           NT= not tested per MD protocol      Exercises:R shoulder scope with SAD and RTC repair on 3/8/19   Exercise/Equipment Resistance/Repetitions Other comments        Codmans f/b, s/s, cw/ccw 10x each -: P/AAROM  5/3-5/17: Begin AROM, gentle strengthening, isometrics  5/31-6/14: initiate full strengthening   Swiss ball flexion                    scaption 30x  30x AAROM   Polina flex 20x    Stand cane ext                      IR Add  add    scap squeeze 20x    shrugs 20x    Bicep curls 10x    Table slides flex                     scaption                     ER 10x 5sec  10x 5sec  10x 5sec         Supine cane ER 10x              L SL scap PROM  10x each all directions    Shoulder PROM 4-way 10x each  Per Protocol:   Flex to 150, abd to 70, ER to tolerance with 45 degrees abd  4/30-constant cueing needed for patient to relax with PROM        CP after exercises x10 mins R shoulder      Other Therapeutic Activities:  Pt was educated on PT POC, Diagnosis, Prognosis, pathomechanics as well as frequency and duration of scheduling future physical therapy appointments. Time was also taken on this day to answer all patient questions and participation in PT. Reviewed appointment policy in detail with patient and patient verbalized understanding. Discussed with patient at length MD restriction of PROM only. Patient instructed to avoid yardwork activities such as driving lawnmower, cutting down trees. Patient voiced understanding. Home Exercise Program:  Patient instructed in the following for HEP: pendulums, shrugs, scap squeeze, codmans, supine cane ER. Patient verbalized/demonstrated understanding and was issued written handout. Timed Code Treatment Minutes:  30    Total Treatment Minutes:  40    Assessment:  [] Patient tolerated treatment well [] Patient limited by fatigue  [x] Patient limited by pain  [] Patient limited by other medical complications  [] Other:     Prognosis: [x] Good [] Fair  [] Poor    Goals:    Short term goals  Time Frame for Short term goals: 3 weeks  Short term goal 1: Patient will be independent with HEP for prevention of reinjury.    Short term goal 2: Patient will report 30% improvement

## 2019-05-07 ENCOUNTER — HOSPITAL ENCOUNTER (OUTPATIENT)
Dept: PHYSICAL THERAPY | Age: 69
Setting detail: THERAPIES SERIES
Discharge: HOME | End: 2019-05-07
Payer: MEDICARE

## 2019-05-07 PROCEDURE — 97110 THERAPEUTIC EXERCISES: CPT

## 2019-05-07 NOTE — FLOWSHEET NOTE
Physical Therapy Daily Treatment Note  Date:  2019    Patient Name:  Gavi Mckeon    :  1950  MRN: [id number]    Restrictions/Precautions: Fall Risk(low: pt reports no history of frequent falls. )    Pertinent Medical History:  Afib    Medical/Treatment Diagnosis Information:  · Diagnosis: Complete tear of R RTC   · Treatment Diagnosis: Decreased mobility and strength    Insurance/Certification information:  PT Insurance Information: Medicare   Physician Information:  Referring Practitioner: Raj Wallis MD  Plan of care signed (Y/N): sent for cosign  (received)    Visit# / total visits:    Pain level: 2/10     Functional Outcomes Measure: at eval  Test: quick dash   Score:  28    Progress Note: []  Yes  [x]  No  Next due by: Visit #10      History of Injury: Patient underwent R shoulder scope with SAD and RTC repair on 3/8/19. Has been wearing sling at all times as instructed. Took abduction pillow out yesterday as instructed by MD. OP report notes large full-thickness retracted tear of the supraspinatus. Continues to sleep in a recliner for the majority of the night. Subjective:   Slept a little wrong on his arm last night so a little sore this morning. Objective:  Observation:   · Palpation: mild TTP R AC joint area  · Observation: Patient to PT wearing sling with arm in protected position.    · Scar: incision portals healed with no signs of infection  Test measurements:    ROM:  Date      Shldr flexion    Shldr abd  Shldr IR         Shldr ER   A P A P A P A P   Eval   40  60  30 at 45 degrees abd  15 at 45 degrees abd                 Strength:  Date Shoulder flexion Shoulder abduction Shoulder IR Shoulder  ER Bicep   Eval  NT NT  NT  NT NT           NT= not tested per MD protocol      Exercises:R shoulder scope with SAD and RTC repair on 3/8/19   Exercise/Equipment Resistance/Repetitions Other comments        Codmans f/b, s/s, cw/ccw 10x each -: P/AAROM  5/3-5/17: Begin AROM, gentle strengthening, isometrics  5/31-6/14: initiate full strengthening   Swiss ball flexion                    scaption 30x  30x AAROM   Polina flex 20x    Stand cane ext                      IR 10x  10x    scap squeeze 20x    shrugs 20x    Bicep curls 10x    Table slides flex                     scaption                     ER 10x 5sec  10x 5sec  10x 5sec    Shoulder isometrics all directions Add          Supine cane ER                       flexion 10x  add              L SL scap PROM  10x each all directions    Shoulder PROM 4-way 10x each  Per Protocol:   Flex to 150, abd to 70, ER to tolerance with 45 degrees abd  4/30-constant cueing needed for patient to relax with PROM        CP after exercises x10 mins R shoulder      Other Therapeutic Activities:  Pt was educated on PT POC, Diagnosis, Prognosis, pathomechanics as well as frequency and duration of scheduling future physical therapy appointments. Time was also taken on this day to answer all patient questions and participation in PT. Reviewed appointment policy in detail with patient and patient verbalized understanding. Discussed with patient at length MD restriction of PROM only. Patient instructed to avoid yardwork activities such as driving lawnmower, cutting down trees. Patient voiced understanding. Home Exercise Program:  Patient instructed in the following for HEP: pendulums, shrugs, scap squeeze, codmans, supine cane ER. Patient verbalized/demonstrated understanding and was issued written handout. 5/7/19: Added post capsule stretch to HEP. Patient instructed to get shoulder pulley for home use. Patient verbalized/demonstrated understanding and was issued written handout.     Timed Code Treatment Minutes:  30    Total Treatment Minutes:  40    Assessment:  [] Patient tolerated treatment well [] Patient limited by fatigue  [x] Patient limited by pain  [] Patient limited by other medical complications  [] Other: Prognosis: [x] Good [] Fair  [] Poor    Goals:    Short term goals  Time Frame for Short term goals: 3 weeks  Short term goal 1: Patient will be independent with HEP for prevention of reinjury. Short term goal 2: Patient will report 30% improvement with pain for ease with ADLs. Short term goal 3: Patient will tolerate 10 mins of AAROM exercises without increased pain for ease with dressing. Long term goals  Time Frame for Long term goals : at discharge  Long term goal 1: Increase R shoulder PROM to flex/abd=160, IR=70, and ER=70 for ease with dressing. Long term goal 2: Increase UE strength to 4+/5 for ease with lifting. Long term goal 3: Patient will report 70% improvement for ease with ADLs.      Patient Requires Follow-up: [x] Yes  [] No    Plan:   [x] Continue per plan of care [] Alter current plan (see comments)  [] Plan of care initiated [] Hold pending MD visit [] Discharge    Plan for Next Session:  Add above as stated    Electronically signed by:  Yadira Finley, [id number] [address]

## 2019-05-09 ENCOUNTER — HOSPITAL ENCOUNTER (OUTPATIENT)
Dept: PHYSICAL THERAPY | Age: 69
Setting detail: THERAPIES SERIES
Discharge: HOME | End: 2019-05-09
Payer: MEDICARE

## 2019-05-09 PROCEDURE — 97110 THERAPEUTIC EXERCISES: CPT

## 2019-05-09 NOTE — FLOWSHEET NOTE
Physical Therapy Daily Treatment Note  Date:  2019    Patient Name:  Naa Lay    :  1950  MRN: [id number]    Restrictions/Precautions: Fall Risk(low: pt reports no history of frequent falls. )    Pertinent Medical History:  Afib    Medical/Treatment Diagnosis Information:  · Diagnosis: Complete tear of R RTC   · Treatment Diagnosis: Decreased mobility and strength    Insurance/Certification information:  PT Insurance Information: Medicare   Physician Information:  Referring Practitioner: Donald Diaz MD  Plan of care signed (Y/N): sent for cosign  (received)    Visit# / total visits:    Pain level: 3/10     Functional Outcomes Measure: at eval  Test: quick dash   Score:  28    Progress Note: []  Yes  [x]  No  Next due by: Visit #10      History of Injury: Patient underwent R shoulder scope with SAD and RTC repair on 3/8/19. Has been wearing sling at all times as instructed. Took abduction pillow out yesterday as instructed by MD. OP report notes large full-thickness retracted tear of the supraspinatus. Continues to sleep in a recliner for the majority of the night. Subjective:   Received shoulder pulley in the mail yesterday. Shoulder is a little sore today from using pulley yesterday. Objective:  Observation:   · Palpation: mild TTP R AC joint area  · Observation: Patient to PT wearing sling with arm in protected position.    · Scar: incision portals healed with no signs of infection  Test measurements:    ROM:  Date      Shldr flexion    Shldr abd  Shldr IR         Shldr ER   A P A P A P A P   Eval   40  60  30 at 45 degrees abd  15 at 45 degrees abd                 Strength:  Date Shoulder flexion Shoulder abduction Shoulder IR Shoulder  ER Bicep   Eval  NT NT  NT  NT NT           NT= not tested per MD protocol      Exercises:R shoulder scope with SAD and RTC repair on 3/8/19   Exercise/Equipment Resistance/Repetitions Other comments          -: well [] Patient limited by fatigue  [x] Patient limited by pain  [] Patient limited by other medical complications  [x] Other: 5/9: cueing required throughout session to avoid UT substitution. Patient also with difficult time relaxing shoulder during PROM. Prognosis: [x] Good [] Fair  [] Poor    Goals:    Short term goals  Time Frame for Short term goals: 3 weeks  Short term goal 1: Patient will be independent with HEP for prevention of reinjury. Short term goal 2: Patient will report 30% improvement with pain for ease with ADLs. Short term goal 3: Patient will tolerate 10 mins of AAROM exercises without increased pain for ease with dressing. Long term goals  Time Frame for Long term goals : at discharge  Long term goal 1: Increase R shoulder PROM to flex/abd=160, IR=70, and ER=70 for ease with dressing. Long term goal 2: Increase UE strength to 4+/5 for ease with lifting. Long term goal 3: Patient will report 70% improvement for ease with ADLs.      Patient Requires Follow-up: [x] Yes  [] No    Plan:   [x] Continue per plan of care [] Alter current plan (see comments)  [] Plan of care initiated [] Hold pending MD visit [] Discharge    Plan for Next Session:  Add above as stated    Electronically signed by:  Trino Young, [id number] [address]

## 2019-05-13 ENCOUNTER — HOSPITAL ENCOUNTER (OUTPATIENT)
Dept: PHYSICAL THERAPY | Age: 69
Setting detail: THERAPIES SERIES
Discharge: HOME | End: 2019-05-13
Payer: MEDICARE

## 2019-05-13 PROCEDURE — 97110 THERAPEUTIC EXERCISES: CPT

## 2019-05-13 NOTE — FLOWSHEET NOTE
Physical Therapy Daily Treatment Note  Date:  2019    Patient Name:  Chelsea Antony    :  1950  MRN: [id number]    Restrictions/Precautions: Fall Risk(low: pt reports no history of frequent falls. )    Pertinent Medical History:  Afib    Medical/Treatment Diagnosis Information:  · Diagnosis: Complete tear of R RTC   · Treatment Diagnosis: Decreased mobility and strength    Insurance/Certification information:  PT Insurance Information: Medicare   Physician Information:  Referring Practitioner: Rober Quinones MD  Plan of care signed (Y/N): sent for cosign  (received)    Visit# / total visits:    Pain level: 3/10     Functional Outcomes Measure: at eval  Test: quick dash   Score:  28    Progress Note: []  Yes  [x]  No  Next due by: Visit #10      History of Injury: Patient underwent R shoulder scope with SAD and RTC repair on 3/8/19. Has been wearing sling at all times as instructed. Took abduction pillow out yesterday as instructed by MD. OP report notes large full-thickness retracted tear of the supraspinatus. Continues to sleep in a recliner for the majority of the night. Subjective:   Bought a pulley and was using that over the weekend, so is a little aggravated today    Objective:  Observation:   · Palpation: mild TTP R AC joint area  · Observation: Patient to PT wearing sling with arm in protected position.    · Scar: incision portals healed with no signs of infection  Test measurements:    ROM:  Date      Shldr flexion    Shldr abd  Shldr IR         Shldr ER   A P A P A P A P   Eval   40  60  30 at 45 degrees abd  15 at 45 degrees abd                 Strength:  Date Shoulder flexion Shoulder abduction Shoulder IR Shoulder  ER Bicep   Eval  NT NT  NT  NT NT           NT= not tested per MD protocol      Exercises:R shoulder scope with SAD and RTC repair on 3/8/19   Exercise/Equipment Resistance/Repetitions Other comments          -: P/AAROM  5/3-: Begin AROM,

## 2019-05-14 ENCOUNTER — HOSPITAL ENCOUNTER (OUTPATIENT)
Dept: PHYSICAL THERAPY | Age: 69
Setting detail: THERAPIES SERIES
Discharge: HOME | End: 2019-05-14
Payer: MEDICARE

## 2019-05-14 PROCEDURE — 97110 THERAPEUTIC EXERCISES: CPT

## 2019-05-14 NOTE — FLOWSHEET NOTE
Minutes: 50    Total Treatment Minutes:  50    Assessment:  [] Patient tolerated treatment well [] Patient limited by fatigue  [x] Patient limited by pain  [] Patient limited by other medical complications  [x] Other:   Significant tightness noted with PROM in all directions. Prognosis: [x] Good [] Fair  [] Poor    Goals:    Short term goals  Time Frame for Short term goals: 3 weeks  Short term goal 1: Patient will be independent with HEP for prevention of reinjury. Short term goal 2: Patient will report 30% improvement with pain for ease with ADLs. Short term goal 3: Patient will tolerate 10 mins of AAROM exercises without increased pain for ease with dressing. Long term goals  Time Frame for Long term goals : at discharge  Long term goal 1: Increase R shoulder PROM to flex/abd=160, IR=70, and ER=70 for ease with dressing. Long term goal 2: Increase UE strength to 4+/5 for ease with lifting. Long term goal 3: Patient will report 70% improvement for ease with ADLs.      Patient Requires Follow-up: [x] Yes  [] No    Plan:   [x] Continue per plan of care [] Alter current plan (see comments)  [] Plan of care initiated [] Hold pending MD visit [] Discharge    Plan for Next Session:  Add above as stated    Electronically signed by:  Ines Rose, [id number] [address]

## 2019-05-16 ENCOUNTER — HOSPITAL ENCOUNTER (OUTPATIENT)
Dept: PHYSICAL THERAPY | Age: 69
Setting detail: THERAPIES SERIES
Discharge: HOME | End: 2019-05-16
Payer: MEDICARE

## 2019-05-16 PROCEDURE — 97110 THERAPEUTIC EXERCISES: CPT

## 2019-05-16 NOTE — FLOWSHEET NOTE
Physical Therapy Daily Treatment Note  Date:  2019    Patient Name:  Edd Wallace    :  1950  MRN: [id number]    Restrictions/Precautions: Fall Risk(low: pt reports no history of frequent falls. )    Pertinent Medical History:  Afib    Medical/Treatment Diagnosis Information:  · Diagnosis: Complete tear of R RTC   · Treatment Diagnosis: Decreased mobility and strength    Insurance/Certification information:  PT Insurance Information: Medicare   Physician Information:  Referring Practitioner: Saray Manriquez MD  Plan of care signed (Y/N): sent for cosign  (received)    Visit# / total visits:    Pain level: 3/10     Functional Outcomes Measure: at eval  Test: quick dash   Score:  28    Progress Note: []  Yes  [x]  No  Next due by: Visit #10      History of Injury: Patient underwent R shoulder scope with SAD and RTC repair on 3/8/19. Has been wearing sling at all times as instructed. Took abduction pillow out yesterday as instructed by MD. OP report notes large full-thickness retracted tear of the supraspinatus. Continues to sleep in a recliner for the majority of the night. Subjective:   No new complaints with shoulder. Shoulder feels tight. Objective:  Observation:   · Palpation: mild TTP R AC joint area  · Observation: Patient to PT wearing sling with arm in protected position.    · Scar: incision portals healed with no signs of infection  Test measurements:    ROM:  Date      Shldr flexion    Shldr abd  Shldr IR         Shldr ER   A P A P A P A P   Eval   40  60  30 at 45 degrees abd  15 at 45 degrees abd                 Strength:  Date Shoulder flexion Shoulder abduction Shoulder IR Shoulder  ER Bicep   Eval  NT NT  NT  NT NT           NT= not tested per MD protocol      Exercises:R shoulder scope with SAD and RTC repair on 3/8/19   Exercise/Equipment Resistance/Repetitions Other comments          -: P/AAROM  5/3-: Begin AROM, gentle strengthening, isometrics  5/31-6/14: initiate full strengthening   Swiss ball flexion                    scaption 30x  30x AROM - progress to UE ranger   Polina flex 20x    Stand cane ext                      IR 10x  10x    Tband row              Lat             add Yellow 10x  Yellow 10x  add    Bicep curls 2# 20x    Table slides flex                     scaption                     ER 10x 5 sec  10x 5 sec  10x 5 sec Cueing to avoid UT substitiution   Shoulder isometrics flex, abd, ext  IR/ER   10x 5 sec holds  10x 5 sec holds         Supine cane ER                       Flexion                  Chest press 10x  10x  10x     Add weight   Supine flexion AROM 10x    SL abd Add     Supine serratus punch Add     SL sleeper stretch 3x 20 sec          L SL scap PROM  10x each all directions    Shoulder PROM 4-way 10x each  Per Protocol:   Flex/Abd to 180,  ER to tolerance   constant cueing needed for patient to relax with PROM   Glenohumeral mobs A/P, Inf Grade III 10x each          CP after exercises  Pt declined, will do at home. Other Therapeutic Activities:  Pt was educated on PT POC, Diagnosis, Prognosis, pathomechanics as well as frequency and duration of scheduling future physical therapy appointments. Time was also taken on this day to answer all patient questions and participation in PT. Reviewed appointment policy in detail with patient and patient verbalized understanding. Discussed with patient at length MD restriction of PROM only. Patient instructed to avoid yardwork activities such as driving lawnmower, cutting down trees. Patient voiced understanding. Home Exercise Program:  Patient instructed in the following for HEP: pendulums, shrugs, scap squeeze, codmans, supine cane ER. Patient verbalized/demonstrated understanding and was issued written handout. 5/7/19: Added post capsule stretch to HEP. Patient instructed to get shoulder pulley for home use.  Patient verbalized/demonstrated understanding and was issued written handout. 5/16/19: updated hep to include supine flexion AROM, isometrics, supine cane flexion,and SL sleeper stretch. Patient verbalized/demonstrated understanding and was issued written handout. Timed Code Treatment Minutes: 50    Total Treatment Minutes:  50    Assessment:  [] Patient tolerated treatment well [] Patient limited by fatigue  [x] Patient limited by pain  [] Patient limited by other medical complications  [x] Other:   Significant tightness noted with PROM in all directions. Prognosis: [x] Good [] Fair  [] Poor    Goals:    Short term goals  Time Frame for Short term goals: 3 weeks  Short term goal 1: Patient will be independent with HEP for prevention of reinjury. Short term goal 2: Patient will report 30% improvement with pain for ease with ADLs. Short term goal 3: Patient will tolerate 10 mins of AAROM exercises without increased pain for ease with dressing. Long term goals  Time Frame for Long term goals : at discharge  Long term goal 1: Increase R shoulder PROM to flex/abd=160, IR=70, and ER=70 for ease with dressing. Long term goal 2: Increase UE strength to 4+/5 for ease with lifting. Long term goal 3: Patient will report 70% improvement for ease with ADLs.      Patient Requires Follow-up: [x] Yes  [] No    Plan:   [x] Continue per plan of care [] Alter current plan (see comments)  [] Plan of care initiated [] Hold pending MD visit [] Discharge    Plan for Next Session:  Add above as stated    Electronically signed by:  Bhumi Hinojosa, [id number] [address]

## 2019-05-20 ENCOUNTER — HOSPITAL ENCOUNTER (OUTPATIENT)
Dept: PHYSICAL THERAPY | Age: 69
Setting detail: THERAPIES SERIES
Discharge: HOME | End: 2019-05-20
Payer: MEDICARE

## 2019-05-20 PROCEDURE — 97110 THERAPEUTIC EXERCISES: CPT

## 2019-05-20 NOTE — FLOWSHEET NOTE
full strengthening   UE [place] flexion                    scaption 20x  20x Tactile cues   Pulley flex 20x    Stand cane ext                      IR 10x  10x    Tband row              Lat             add Green 15x  Green 15x  Green 15x    Bicep curls 2# 20x    Table slides flex                     scaption                     ER 10x 5sec  10x 5sec  10x 5sec Cueing to avoid UT substitiution   Shoulder isometrics flex, abd, ext  IR/ER   10x 5 sec holds  10x 5 sec holds         Supine cane ER                       Flexion                  Chest press 10x  10x  10x    Supine flexion AROM 10x    SL sleeper stretch 3x 15 sec          L SL scap PROM  10x each all directions    Shoulder PROM 4-way 10x each  Per Protocol:   Flex/Abd to 180,  ER to tolerance   constant cueing needed for patient to relax with PROM   Glenohumeral mobs A/P, Inf Grade III 10x each          CP after exercises x10 mins R shoulder Pt declined, will do at home. Other Therapeutic Activities:  Pt was educated on PT POC, Diagnosis, Prognosis, pathomechanics as well as frequency and duration of scheduling future physical therapy appointments. Time was also taken on this day to answer all patient questions and participation in PT. Reviewed appointment policy in detail with patient and patient verbalized understanding. Discussed with patient at length MD restriction of PROM only. Patient instructed to avoid yardwork activities such as driving lawnmower, cutting down trees. Patient voiced understanding. Home Exercise Program:  Patient instructed in the following for HEP: pendulums, shrugs, scap squeeze, codmans, supine cane ER. Patient verbalized/demonstrated understanding and was issued written handout. 5/7/19: Added post capsule stretch to HEP. Patient instructed to get shoulder pulley for home use. Patient verbalized/demonstrated understanding and was issued written handout.     Timed Code Treatment Minutes: 35    Total Treatment Minutes: 45    Assessment:  [] Patient tolerated treatment well [] Patient limited by fatigue  [x] Patient limited by pain  [] Patient limited by other medical complications  [x] Other:   Significant tightness noted with PROM in all directions. Prognosis: [x] Good [] Fair  [] Poor    Goals:    Short term goals  Time Frame for Short term goals: 3 weeks  Short term goal 1: Patient will be independent with HEP for prevention of reinjury. Short term goal 2: Patient will report 30% improvement with pain for ease with ADLs. Short term goal 3: Patient will tolerate 10 mins of AAROM exercises without increased pain for ease with dressing. Long term goals  Time Frame for Long term goals : at discharge  Long term goal 1: Increase R shoulder PROM to flex/abd=160, IR=70, and ER=70 for ease with dressing. Long term goal 2: Increase UE strength to 4+/5 for ease with lifting. Long term goal 3: Patient will report 70% improvement for ease with ADLs.      Patient Requires Follow-up: [x] Yes  [] No    Plan:   [x] Continue per plan of care [] Alter current plan (see comments)  [] Plan of care initiated [] Hold pending MD visit [] Discharge    Plan for Next Session:  Add above as stated    Electronically signed by:  Diana Stahl, PTA [id number]

## 2019-05-21 ENCOUNTER — HOSPITAL ENCOUNTER (OUTPATIENT)
Dept: PHYSICAL THERAPY | Age: 69
Setting detail: THERAPIES SERIES
Discharge: HOME | End: 2019-05-21
Payer: MEDICARE

## 2019-05-21 PROCEDURE — 97110 THERAPEUTIC EXERCISES: CPT

## 2019-05-21 NOTE — FLOWSHEET NOTE
Physical Therapy Daily Treatment Note  Date:  2019    Patient Name:  Robin Clemens    :  1950  MRN: [id number]    Restrictions/Precautions: Fall Risk(low: pt reports no history of frequent falls. )    Pertinent Medical History:  Afib    Medical/Treatment Diagnosis Information:  · Diagnosis: Complete tear of R RTC   · Treatment Diagnosis: Decreased mobility and strength    Insurance/Certification information:  PT Insurance Information: Medicare   Physician Information:  Referring Practitioner: Gregory Villanueva MD  Plan of care signed (Y/N): sent for cosign  (received)    Visit# / total visits:    Pain level: 3/10     Functional Outcomes Measure: at eval  Test: quick dash   Score:  28    Progress Note: []  Yes  [x]  No  Next due by: Visit #10      History of Injury: Patient underwent R shoulder scope with SAD and RTC repair on 3/8/19. Has been wearing sling at all times as instructed. Took abduction pillow out yesterday as instructed by MD. OP report notes large full-thickness retracted tear of the supraspinatus. Continues to sleep in a recliner for the majority of the night. Subjective: can't sleep on arm very long at night. Objective:  Observation:   · Palpation: mild TTP R AC joint area  · Observation: Patient to PT wearing sling with arm in protected position.    · Scar: incision portals healed with no signs of infection  Test measurements:    ROM:  Date      Shldr flexion    Shldr abd  Shldr IR         Shldr ER   A P A P A P A P   Eval   40  60  30 at 45 degrees abd  15 at 45 degrees abd    19  112  120  50 at 90 degrees abd  50 at 90 degrees abd     Strength:  Date Shoulder flexion Shoulder abduction Shoulder IR Shoulder  ER Bicep   Eval  NT NT  NT  NT NT           NT= not tested per MD protocol      Exercises:R shoulder scope with SAD and RTC repair on 3/8/19   Exercise/Equipment Resistance/Repetitions Other comments          -: P/NAY  5/3-: Latonya Hurd AROM, gentle strengthening, isometrics  5/31-6/14: initiate full strengthening   UE [place] flexion                    scaption 20x  20x Tactile cues   [place] flex 20x    Stand cane ext                      IR 10x  10x    Tband row              Lat             Add             triceps Green 15x  Green 15x  Green 15x  Green 15x    Bicep curls 2# 20x    Table slides flex                     scaption                     ER 10x 5sec  10x 5sec  10x 5sec Cueing to avoid UT substitiution   Shoulder isometrics flex, abd, ext  IR/ER   10x 5 sec holds  10x 5 sec holds         Supine cane ER                       Flexion                  Chest press 10x  10x  10x    Supine flexion AROM 10x    SL sleeper stretch 3x 15 sec     SL abd        ER Add  0# 2x10 R         L SL scap PROM  10x each all directions    Shoulder PROM 4-way 10x each  Per Protocol:   Flex/Abd to 180,  ER to tolerance   constant cueing needed for patient to relax with PROM   Glenohumeral mobs A/P, Inf Grade III 10x each          CP after exercises  Pt declined, will do at home. Other Therapeutic Activities:  Pt was educated on PT POC, Diagnosis, Prognosis, pathomechanics as well as frequency and duration of scheduling future physical therapy appointments. Time was also taken on this day to answer all patient questions and participation in PT. Reviewed appointment policy in detail with patient and patient verbalized understanding. Discussed with patient at length MD restriction of PROM only. Patient instructed to avoid yardwork activities such as driving lawnmower, cutting down trees. Patient voiced understanding. Home Exercise Program:  Patient instructed in the following for HEP: pendulums, shrugs, scap squeeze, codmans, supine cane ER. Patient verbalized/demonstrated understanding and was issued written handout. 5/7/19: Added post capsule stretch to HEP. Patient instructed to get shoulder pulley for home use.  Patient verbalized/demonstrated understanding and was issued written handout. Timed Code Treatment Minutes: 45    Total Treatment Minutes:  45    Assessment:  [] Patient tolerated treatment well [] Patient limited by fatigue  [x] Patient limited by pain  [] Patient limited by other medical complications  [x] Other:   Significant tightness noted with PROM in all directions. Prognosis: [x] Good [] Fair  [] Poor    Goals:    Short term goals  Time Frame for Short term goals: 3 weeks  Short term goal 1: Patient will be independent with HEP for prevention of reinjury. Short term goal 2: Patient will report 30% improvement with pain for ease with ADLs. Short term goal 3: Patient will tolerate 10 mins of AAROM exercises without increased pain for ease with dressing. Long term goals  Time Frame for Long term goals : at discharge  Long term goal 1: Increase R shoulder PROM to flex/abd=160, IR=70, and ER=70 for ease with dressing. Long term goal 2: Increase UE strength to 4+/5 for ease with lifting. Long term goal 3: Patient will report 70% improvement for ease with ADLs.      Patient Requires Follow-up: [x] Yes  [] No    Plan:   [x] Continue per plan of care [] Alter current plan (see comments)  [] Plan of care initiated [] Hold pending MD visit [] Discharge    Plan for Next Session:  Add above as stated    Electronically signed by:  Keila Fontana, [id number] Jacky Acosta

## 2019-05-23 ENCOUNTER — HOSPITAL ENCOUNTER (OUTPATIENT)
Dept: PHYSICAL THERAPY | Age: 69
Setting detail: THERAPIES SERIES
Discharge: HOME | End: 2019-05-23
Payer: MEDICARE

## 2019-05-23 PROCEDURE — 97110 THERAPEUTIC EXERCISES: CPT

## 2019-05-23 NOTE — FLOWSHEET NOTE
Physical Therapy Daily Treatment Note  Date:  2019    Patient Name:  Joey Parish    :  1950  MRN: [id number]    Restrictions/Precautions: Fall Risk(low: pt reports no history of frequent falls. )    Pertinent Medical History:  Afib    Medical/Treatment Diagnosis Information:  · Diagnosis: Complete tear of R RTC   · Treatment Diagnosis: Decreased mobility and strength    Insurance/Certification information:  PT Insurance Information: Medicare   Physician Information:  Referring Practitioner: Aicha Patel MD  Plan of care signed (Y/N): sent for cosign  (received)    Visit# / total visits:    Pain level: 3/10     Functional Outcomes Measure: at eval  Test: quick dash   Score:  28    Progress Note: []  Yes  [x]  No  Next due by: Visit #10      History of Injury: Patient underwent R shoulder scope with SAD and RTC repair on 3/8/19. Has been wearing sling at all times as instructed. Took abduction pillow out yesterday as instructed by MD. OP report notes large full-thickness retracted tear of the supraspinatus. Continues to sleep in a recliner for the majority of the night. Subjective: has been using a 2# weight around wrist when stretching arm with supine flexion at home    Objective:  Observation:   · Palpation: mild TTP R AC joint area  · Observation: Patient to PT wearing sling with arm in protected position.    · Scar: incision portals healed with no signs of infection  Test measurements:    ROM:  Date      Shldr flexion    Shldr abd  Shldr IR         Shldr ER   A P A P A P A P   Eval   40  60  30 at 45 degrees abd  15 at 45 degrees abd    19  112  120  50 at 90 degrees abd  50 at 90 degrees abd     Strength:  Date Shoulder flexion Shoulder abduction Shoulder IR Shoulder  ER Bicep   Eval  NT NT  NT  NT NT           NT= not tested per MD protocol      Exercises:R shoulder scope with SAD and RTC repair on 3/8/19   Exercise/Equipment Resistance/Repetitions Other comments 4/12-4/19: P/AAROM  5/3-5/17: Begin AROM, gentle strengthening, isometrics  5/31-6/14: initiate full strengthening   UE [place] flexion                    scaption 20x  20x Tactile cues   Pulley flex             IR 20x  10x    Stand cane ext                      IR 10x  10x    Tband row              Lat             Add             Triceps             IR Green 15x  Green 15x  Green 15x  Green 15x  add    Bicep curls 2# 20x    Table slides flex                     scaption                     ER 10x 5sec  10x 5sec  10x 5sec Cueing to avoid UT substitiution   Shoulder isometrics flex, abd, ext  IR/ER   10x 5 sec holds  10x 5 sec holds         Supine cane ER                       Flexion                  Chest press 10x  3# 10x  3# 10x    Supine flexion AROM 10x    Supine serratus punch 0# 10x R    SL sleeper stretch 3x 20 sec     SL abd        ER 0# 2x10 R  0# 2x10 R    Supine IR/ER JOSEPH 30/60/90 add         L SL scap PROM  10x each all directions    Shoulder PROM 4-way 10x each  Per Protocol:   Flex/Abd to 180,  ER to tolerance   constant cueing needed for patient to relax with PROM   Glenohumeral mobs A/P, Inf Grade III 10x each          CP after exercises x10 mins R shoulder      Other Therapeutic Activities:  Pt was educated on PT POC, Diagnosis, Prognosis, pathomechanics as well as frequency and duration of scheduling future physical therapy appointments. Time was also taken on this day to answer all patient questions and participation in PT. Reviewed appointment policy in detail with patient and patient verbalized understanding. Discussed with patient at length MD restriction of PROM only. Patient instructed to avoid yardwork activities such as driving lawnmower, cutting down trees. Patient voiced understanding. Home Exercise Program:  Patient instructed in the following for HEP: pendulums, shrugs, scap squeeze, codmans, supine cane ER.    Patient verbalized/demonstrated understanding and was issued

## 2019-05-28 ENCOUNTER — HOSPITAL ENCOUNTER (OUTPATIENT)
Dept: PHYSICAL THERAPY | Age: 69
Setting detail: THERAPIES SERIES
Discharge: HOME | End: 2019-05-28
Payer: MEDICARE

## 2019-05-28 PROCEDURE — 97110 THERAPEUTIC EXERCISES: CPT

## 2019-05-28 NOTE — FLOWSHEET NOTE
Physical Therapy Daily Treatment Note  Date:  2019    Patient Name:  Michael Bear    :  1950  MRN: [id number]    Restrictions/Precautions: Fall Risk(low: pt reports no history of frequent falls. )    Pertinent Medical History:  Afib    Medical/Treatment Diagnosis Information:  · Diagnosis: Complete tear of R RTC   · Treatment Diagnosis: Decreased mobility and strength    Insurance/Certification information:  PT Insurance Information: Medicare   Physician Information:  Referring Practitioner: Mesfin Patel MD  Plan of care signed (Y/N): sent for cosign  (received) PN sent     Visit# / total visits:  15/18  Pain level: 3/10     Functional Outcomes Measure: at eval  Test: quick dash   Score:  28    Progress Note: []  Yes  [x]  No  Next due by: Visit #10      History of Injury: Patient underwent R shoulder scope with SAD and RTC repair on 3/8/19. Has been wearing sling at all times as instructed. Took abduction pillow out yesterday as instructed by MD. OP report notes large full-thickness retracted tear of the supraspinatus. Continues to sleep in a recliner for the majority of the night. Subjective: Patient reports 75% improvement overall with pain. Patient believes he gained some ROM over the weekend with pulleys and supine flex stretch. Objective:  Observation: Updated 19  · Palpation: no TTP R AC joint area  · Observation: Patient no longer wearing sling. Good arm swing noted with ambulation. UT substitution noted with forward elevation.     · Scar: incision portals healed with no signs of infection  Test measurements:    ROM:  Date      Shldr flexion    Shldr abd     Shldr IR               Shldr ER   A P A P A P A P   Eval   40  60  30 at 45 degrees abd  15 at 45 degrees abd    19  112  120  50 at 90 degrees abd  50 at 90 degrees abd   19  135  120  60 at 90 degrees abd  60 at 90 degrees abd     Strength:  Date Shoulder flexion Shoulder abduction Shoulder IR Shoulder  ER Bicep   Eval 4/23 NT NT  NT  NT NT   5/28/19 3/5 3/5 4/5 3+/5 5/5   NT= not tested per MD protocol      Exercises:R shoulder scope with SAD and RTC repair on 3/8/19   Exercise/Equipment Resistance/Repetitions Other comments          4/12-4/19: P/AAROM  5/3-5/17: Begin AROM, gentle strengthening, isometrics  5/31-6/14: initiate full strengthening   UE [place] flexion                    scaption 20x  20x Tactile cues   Pulley flex             IR 20x  10x    Stand cane ext                      IR 10x  10x    Tband row              Lat             Add             Triceps             IR Green 15x  Green 15x  Green 15x  Green 15x  Green 15x    Bicep curls 2# 20x    Table slides flex                     scaption                     ER 10x 5 sec  10x 5 sec  10x 5 sec Cueing to avoid UT substitiution   Shoulder isometrics flex, abd, ext  IR/ER   10x 5 sec holds  10x 5 sec holds         Supine cane ER                       Flexion                  Chest press 10x  3# 10x  3# 20x    Supine flexion AROM 10x Increase reps   Supine serratus punch 0# 2x10 R    SL sleeper stretch 3x 20 sec     SL abd        ER 0# 2x10 R  0# 2x10 R    Supine IR/ER JOSEPH 30/60/90 5x 5 sec holds    Prone ext             row Add  add         L SL scap PROM  10x each all directions    Shoulder PROM 4-way 10x each  Per Protocol:   Flex/Abd to 180,  ER to tolerance   constant cueing needed for patient to relax with PROM   Glenohumeral mobs A/P, Inf Grade III 10x each          CP after exercises       Other Therapeutic Activities:  Pt was educated on PT POC, Diagnosis, Prognosis, pathomechanics as well as frequency and duration of scheduling future physical therapy appointments. Time was also taken on this day to answer all patient questions and participation in PT. Reviewed appointment policy in detail with patient and patient verbalized understanding. Discussed with patient at length MD restriction of PROM only.   Patient instructed to avoid yardwork activities such as driving lawnmower, cutting down trees. Patient voiced understanding.   5/28/19: Re-eval measurements taken for MD PN. Discussed remaining deficits and continuation of skilled PT at this time. Patient agreeable. Home Exercise Program:  Patient instructed in the following for HEP: pendulums, shrugs, scap squeeze, codmans, supine cane ER. Patient verbalized/demonstrated understanding and was issued written handout. 5/7/19: Added post capsule stretch to HEP. Patient instructed to get shoulder pulley for home use. Patient verbalized/demonstrated understanding and was issued written handout. Timed Code Treatment Minutes: 50    Total Treatment Minutes:  50    Assessment:  [] Patient tolerated treatment well [] Patient limited by fatigue  [x] Patient limited by pain  [] Patient limited by other medical complications  [x] Other:   Significant tightness noted with PROM in all directions. Prognosis: [x] Good [] Fair  [] Poor    Goals:    Short term goals  Time Frame for Short term goals: 3 weeks  Short term goal 1: Patient will be independent with HEP for prevention of reinjury. 5/28 MET  Short term goal 2: Patient will report 30% improvement with pain for ease with ADLs. 5/28 MET  Short term goal 3: Patient will tolerate 10 mins of AAROM exercises without increased pain for ease with dressing. 5/28 MET     Long term goals  Time Frame for Long term goals : at discharge  Long term goal 1: Increase R shoulder PROM to flex/abd=160, IR=70, and ER=70 for ease with dressing. 5/28 PROGRESSING TOWARDS  Long term goal 2:  Increase UE strength to 4+/5 for ease with lifting. 5/28 PROGRESSING TOWARDS  Long term goal 3: Patient will report 70% improvement for ease with ADLs. 5/28 MET    Patient Requires Follow-up: [x] Yes  [] No    Plan:   [x] Continue per plan of care [] Alter current plan (see comments)  [] Plan of care initiated [] Hold pending MD visit [] Discharge    Plan for Next Session:

## 2019-05-29 ENCOUNTER — HOSPITAL ENCOUNTER (OUTPATIENT)
Dept: PHYSICAL THERAPY | Age: 69
Setting detail: THERAPIES SERIES
Discharge: HOME | End: 2019-05-29
Payer: MEDICARE

## 2019-05-29 PROCEDURE — 97110 THERAPEUTIC EXERCISES: CPT

## 2019-05-30 ENCOUNTER — HOSPITAL ENCOUNTER (OUTPATIENT)
Dept: PHYSICAL THERAPY | Age: 69
Setting detail: THERAPIES SERIES
Discharge: HOME | End: 2019-05-30
Payer: MEDICARE

## 2019-05-30 PROCEDURE — 97110 THERAPEUTIC EXERCISES: CPT

## 2019-05-30 NOTE — FLOWSHEET NOTE
Physical Therapy Daily Treatment Note  Date:  2019    Patient Name:  Santiago Roy    :  1950  MRN: [id number]    Restrictions/Precautions: Fall Risk(low: pt reports no history of frequent falls. )    Pertinent Medical History:  Afib    Medical/Treatment Diagnosis Information:  · Diagnosis: Complete tear of R RTC   · Treatment Diagnosis: Decreased mobility and strength    Insurance/Certification information:  PT Insurance Information: Medicare   Physician Information:  Referring Practitioner: Domingo Young MD  Plan of care signed (Y/N): sent for cosign  (received) PN sent  (received)    Visit# / total visits:    Pain level: 1/10     Functional Outcomes Measure: at eval  Test: quick dash   Score:  28    Progress Note: []  Yes  [x]  No  Next due by: Visit #10      History of Injury: Patient underwent R shoulder scope with SAD and RTC repair on 3/8/19. Has been wearing sling at all times as instructed. Took abduction pillow out yesterday as instructed by MD. OP report notes large full-thickness retracted tear of the supraspinatus. Continues to sleep in a recliner for the majority of the night. Subjective:  Able to reach up on the top shelf in kitchen with a little more ease. Also notes easier time reaching across body to put on deodorant. Patient states he still can't reach into the back seat. Objective:  Observation: Updated 19  · Palpation: no TTP R AC joint area  · Observation: Patient no longer wearing sling. Good arm swing noted with ambulation. UT substitution noted with forward elevation.     · Scar: incision portals healed with no signs of infection  Test measurements:    ROM:  Date      Shldr flexion    Shldr abd     Shldr IR               Shldr ER   A P A P A P A P   Eval   40  60  30 at 45 degrees abd  15 at 45 degrees abd    19  112  120  50 at 90 degrees abd  50 at 90 degrees abd   19  135  120  60 at 90 degrees abd  60 at 90 degrees abd Strength:  Date Shoulder flexion Shoulder abduction Shoulder IR Shoulder  ER Bicep   Eval 4/23 NT NT  NT  NT NT   5/28/19 3/5 3/5 4/5 3+/5 5/5   NT= not tested per MD protocol      Exercises:R shoulder scope with SAD and RTC repair on 3/8/19   Exercise/Equipment Resistance/Repetitions Other comments          4/12-4/19: P/AAROM  5/3-5/17: Begin AROM, gentle strengthening, isometrics  5/31-6/14: initiate full strengthening   UE [place] flexion                    scaption 20x  20x Tactile cues   Pulley flex             IR 20x  10x    Stand cane ext                      IR                   Pull across 10x  10x  10x    Tband row              Lat             Add             Triceps             IR             ER Green 15x  Green 15x  Green 15x  Green 15x  Green 15x  add    Bicep curls 3# 30x    Flexion wallslides 10x 5 sec Cueing to avoid UT substitiution        Supine cane ER                       Flexion                  Chest press 10x  3# 10x  3# 20x    Supine flexion AROM              Flexion  0# 2x10 R   add Add 1#   Supine serratus punch 0# 2x10 R Add 1#   SL sleeper stretch 3x 20 sec     SL abd        ER 0# 2x10 R  0# 2x10 R Add 1#   Supine IR/ER JOSEPH 30/60/90 5x 5 sec holds    Prone ext             row 0# 2x10 R  0# 2x10 R         L SL scap PROM  10x each all directions    Shoulder PROM 4-way 10x each  Per Protocol:   Flex/Abd to 180,  ER to tolerance   constant cueing needed for patient to relax with PROM   Glenohumeral mobs A/P, Inf Grade III 10x each          CP after exercises x10 mins R shoulder      Other Therapeutic Activities:  Pt was educated on PT POC, Diagnosis, Prognosis, pathomechanics as well as frequency and duration of scheduling future physical therapy appointments. Time was also taken on this day to answer all patient questions and participation in PT. Reviewed appointment policy in detail with patient and patient verbalized understanding.  Discussed with patient at length MD galan visit [] Discharge    Plan for Next Session:  Add above as stated. Begin full strengthening program as tolerated.      Electronically signed by:  Tom Paredes, [id number] Jacky Acosta

## 2019-06-03 ENCOUNTER — HOSPITAL ENCOUNTER (OUTPATIENT)
Dept: PHYSICAL THERAPY | Age: 69
Setting detail: THERAPIES SERIES
Discharge: HOME | End: 2019-06-03
Payer: MEDICARE

## 2019-06-03 PROCEDURE — 97110 THERAPEUTIC EXERCISES: CPT

## 2019-06-03 NOTE — FLOWSHEET NOTE
Physical Therapy Daily Treatment Note  Date:  6/3/2019    Patient Name:  Kye Murphy    :  1950  MRN: [id number]    Restrictions/Precautions: Fall Risk(low: pt reports no history of frequent falls. )    Pertinent Medical History:  Afib    Medical/Treatment Diagnosis Information:  · Diagnosis: Complete tear of R RTC   · Treatment Diagnosis: Decreased mobility and strength    Insurance/Certification information:  PT Insurance Information: Medicare   Physician Information:  Referring Practitioner: Fady August MD  Plan of care signed (Y/N): sent for cosign  (received) PN sent  (received)    Visit# / total visits:    Pain level: 1/10     Functional Outcomes Measure: at eval  Test: quick dash   Score:  28    Progress Note: []  Yes  [x]  No  Next due by: Visit #10      History of Injury: Patient underwent R shoulder scope with SAD and RTC repair on 3/8/19. Has been wearing sling at all times as instructed. Took abduction pillow out yesterday as instructed by MD. OP report notes large full-thickness retracted tear of the supraspinatus. Continues to sleep in a recliner for the majority of the night. Subjective:  Range is limited, but no pain    Objective:  Observation: Updated 19  · Palpation: no TTP R AC joint area  · Observation: Patient no longer wearing sling. Good arm swing noted with ambulation. UT substitution noted with forward elevation.     · Scar: incision portals healed with no signs of infection  Test measurements:    ROM:  Date      Shldr flexion    Shldr abd     Shldr IR               Shldr ER   A P A P A P A P   Eval   40  60  30 at 45 degrees abd  15 at 45 degrees abd    19  112  120  50 at 90 degrees abd  50 at 90 degrees abd   19  135  120  60 at 90 degrees abd  60 at 90 degrees abd     Strength:  Date Shoulder flexion Shoulder abduction Shoulder IR Shoulder  ER Bicep   Eval  NT NT  NT  NT NT   19 3/5 3/ 4 3+/5    NT= not tested per MD MD PN.  Discussed remaining deficits and continuation of skilled PT at this time. Patient agreeable. Home Exercise Program:  Patient instructed in the following for HEP: pendulums, shrugs, scap squeeze, codmans, supine cane ER. Patient verbalized/demonstrated understanding and was issued written handout. 5/7/19: Added post capsule stretch to HEP. Patient instructed to get shoulder pulley for home use. Patient verbalized/demonstrated understanding and was issued written handout. Timed Code Treatment Minutes: 50    Total Treatment Minutes:  50    Assessment:  [] Patient tolerated treatment well [] Patient limited by fatigue  [x] Patient limited by pain  [] Patient limited by other medical complications  [x] Other:   Significant tightness noted with PROM in all directions. Prognosis: [x] Good [] Fair  [] Poor    Goals:    Short term goals  Time Frame for Short term goals: 3 weeks  Short term goal 1: Patient will be independent with HEP for prevention of reinjury. 5/28 MET  Short term goal 2: Patient will report 30% improvement with pain for ease with ADLs. 5/28 MET  Short term goal 3: Patient will tolerate 10 mins of AAROM exercises without increased pain for ease with dressing. 5/28 MET     Long term goals  Time Frame for Long term goals : at discharge  Long term goal 1: Increase R shoulder PROM to flex/abd=160, IR=70, and ER=70 for ease with dressing. 5/28 PROGRESSING TOWARDS  Long term goal 2: Increase UE strength to 4+/5 for ease with lifting. 5/28 PROGRESSING TOWARDS  Long term goal 3: Patient will report 70% improvement for ease with ADLs. 5/28 MET    Patient Requires Follow-up: [x] Yes  [] No    Plan:   [x] Continue per plan of care [] Alter current plan (see comments)  [] Plan of care initiated [] Hold pending MD visit [] Discharge    Plan for Next Session:  Add above as stated. Begin full strengthening program as tolerated.      Electronically signed by:  Kaye Garcia, PTA [id number]

## 2019-06-04 ENCOUNTER — HOSPITAL ENCOUNTER (OUTPATIENT)
Dept: PHYSICAL THERAPY | Age: 69
Setting detail: THERAPIES SERIES
Discharge: HOME | End: 2019-06-04
Payer: MEDICARE

## 2019-06-04 PROCEDURE — 97110 THERAPEUTIC EXERCISES: CPT

## 2019-06-04 NOTE — FLOWSHEET NOTE
Physical Therapy Daily Treatment Note  Date:  2019    Patient Name:  Anat Ríos    :  1950  MRN: [id number]    Restrictions/Precautions: Fall Risk(low: pt reports no history of frequent falls. )    Pertinent Medical History:  Afib    Medical/Treatment Diagnosis Information:  · Diagnosis: Complete tear of R RTC   · Treatment Diagnosis: Decreased mobility and strength    Insurance/Certification information:  PT Insurance Information: Medicare (needs KX modifier)  Physician Information:  Referring Practitioner: Emil Holder MD  Plan of care signed (Y/N): sent for cosign  (received) PN sent  (received)    Visit# / total visits:    Pain level: 1/10     Functional Outcomes Measure: at eval  Test: quick dash   Score:  28    Progress Note: []  Yes  [x]  No  Next due by: Visit #10      History of Injury: Patient underwent R shoulder scope with SAD and RTC repair on 3/8/19. Has been wearing sling at all times as instructed. Took abduction pillow out yesterday as instructed by MD. OP report notes large full-thickness retracted tear of the supraspinatus. Continues to sleep in a recliner for the majority of the night. Subjective: notes easier time reaching into the back seat to pet the dog. Also able to pick cherries with both arms. Objective:  Observation: Updated 19  · Palpation: no TTP R AC joint area  · Observation: Patient no longer wearing sling. Good arm swing noted with ambulation. UT substitution noted with forward elevation.     · Scar: incision portals healed with no signs of infection  Test measurements:    ROM:  Date      Shldr flexion    Shldr abd     Shldr IR               Shldr ER   A P A P A P A P   Eval   40  60  30 at 45 degrees abd  15 at 45 degrees abd    19  112  120  50 at 90 degrees abd  50 at 90 degrees abd   19  135  120  60 at 90 degrees abd  60 at 90 degrees abd     Strength:  Date Shoulder flexion Shoulder abduction Shoulder IR Shoulder  ER Bicep   Eval 4/23 NT NT  NT  NT NT   5/28/19 3/5 3/5 4/5 3+/5 5/5   NT= not tested per MD protocol      Exercises:R shoulder scope with SAD and RTC repair on 3/8/19   Exercise/Equipment Resistance/Repetitions Other comments          5/31-6/14: initiate full strengthening   UE [place] flexion                    scaption 20x  20x Tactile cues   [place] flex             IR 20x  10x    Stand cane ext                      IR                   Pull across 10x  10x  10x    Tband row              Lat             Add             Triceps             IR             ER Green 15x  Green 15x  Green 15x  Green 15x  Green 15x  Green 15x    Bicep curls 3# 30x    Flexion wallslides 10x 5 sec Cueing to avoid UT substitiution        Supine cane ER                       Flexion                  Chest press 10x  3# 10x  3# 20x    Supine flexion AROM              Flexion  1# 2x10 R   1# 2x10 R Increase weight   Supine serratus punch 1# 2x10 R Increase weight   Supine rhythmic stabs at 90 2x 30 secs    SL sleeper stretch 3x 20 sec     SL abd        ER 1# 2x10 R  1# 2x10 R    Supine IR/ER JOSEPH 30/60/90 5x 5 sec holds    Prone ext             row 1# 2x10 R  1# 2x10 R         L SL scap PROM  10x each all directions    Shoulder PROM 4-way  Contract/relax ER 10x each     3x Per Protocol:   Flex/Abd to 180,  ER to tolerance   constant cueing needed for patient to relax with PROM   Glenohumeral mobs A/P, Inf Grade III 10x each          CP after exercises  At home today     Other Therapeutic Activities:  Pt was educated on PT POC, Diagnosis, Prognosis, pathomechanics as well as frequency and duration of scheduling future physical therapy appointments. Time was also taken on this day to answer all patient questions and participation in PT. Reviewed appointment policy in detail with patient and patient verbalized understanding. Discussed with patient at length MD restriction of PROM only.   Patient instructed to avoid yardwork above as stated. Begin full strengthening program as tolerated.      Electronically signed by:  Tammy Goff, [id number] Jacky Acosta

## 2019-06-05 ENCOUNTER — HOSPITAL ENCOUNTER (OUTPATIENT)
Dept: PHYSICAL THERAPY | Age: 69
Setting detail: THERAPIES SERIES
Discharge: HOME | End: 2019-06-05
Payer: MEDICARE

## 2019-06-05 PROCEDURE — 97110 THERAPEUTIC EXERCISES: CPT

## 2019-06-05 NOTE — FLOWSHEET NOTE
Physical Therapy Daily Treatment Note  Date:  2019    Patient Name:  Leora Vázquez    :  1950  MRN: [id number]    Restrictions/Precautions: Fall Risk(low: pt reports no history of frequent falls. )    Pertinent Medical History:  Afib    Medical/Treatment Diagnosis Information:  · Diagnosis: Complete tear of R RTC   · Treatment Diagnosis: Decreased mobility and strength    Insurance/Certification information:  PT Insurance Information: Medicare (needs KX modifier)  Physician Information:  Referring Practitioner: Peter Santana MD  Plan of care signed (Y/N): sent for cosign  (received) PN sent  (received)    Visit# / total visits:    Pain level: 1/10     Functional Outcomes Measure: at eval  Test: quick dash   Score:  28    Progress Note: []  Yes  [x]  No  Next due by: Visit #10      History of Injury: Patient underwent R shoulder scope with SAD and RTC repair on 3/8/19. Has been wearing sling at all times as instructed. Took abduction pillow out yesterday as instructed by MD. OP report notes large full-thickness retracted tear of the supraspinatus. Continues to sleep in a recliner for the majority of the night. Subjective: notes easier time reaching into the back seat to pet the dog. Also able to pick cherries with both arms. 19 pt has been able to cut the grass, fish, put deodorant under the left arm    Objective:  Observation: Updated 19  · Palpation: no TTP R AC joint area  · Observation: Patient no longer wearing sling. Good arm swing noted with ambulation. UT substitution noted with forward elevation.     · Scar: incision portals healed with no signs of infection  Test measurements:    ROM:  Date      Shldr flexion    Shldr abd     Shldr IR               Shldr ER   A P A P A P A P   Eval   40  60  30 at 45 degrees abd  15 at 45 degrees abd    19  112  120  50 at 90 degrees abd  50 at 90 degrees abd   19  135  120  60 at 90 degrees abd  60 at 90 degrees abd     Strength:  Date Shoulder flexion Shoulder abduction Shoulder IR Shoulder  ER Bicep   Eval 4/23 NT NT  NT  NT NT   5/28/19 3/5 3/5 4/5 3+/5 5/5   NT= not tested per MD protocol      Exercises:R shoulder scope with SAD and RTC repair on 3/8/19   Exercise/Equipment Resistance/Repetitions Other comments          5/31-6/14: initiate full strengthening   UE [place] flexion                    scaption 20x  20x Tactile cues   [place]ley flex             IR 20x  10x    Stand cane ext                      IR                   Pull across 10x  10x  10x    Tband row              Lat             Add             Triceps             IR             ER Green 15x  Green 15x  Green 15x  Green 15x  Green 15x  Green 15x    Bicep curls 3# 30x    Flexion wallslides 10x 5 sec Cueing to avoid UT substitiution        Supine cane ER                       Flexion                  Chest press 10x  3# 10x  3# 20x    Supine flexion AROM              Flexion  1# 2x10 R   1# 2x10 R Increase weight   Supine serratus punch 1# 2x10 R Increase weight   Supine rhythmic stabs at 90 2x 30 secs    SL sleeper stretch 3x 20 sec     SL abd        ER 1# 2x10 R  1# 2x10 R    Supine IR/ER JOSEPH 30/60/90 5x 5 sec holds    Rhythmic stab, multiangle elevation 4x30\"    Prone ext             row 1# 2x10 R  1# 2x10 R         L SL scap PROM  10x each all directions    Shoulder PROM 4-way  Contract/relax ER 10x each     3x Per Protocol:   Flex/Abd to 180,  ER to tolerance   constant cueing needed for patient to relax with PROM   Glenohumeral mobs A/P, Inf Grade III 10x each          CP after exercises  At home today     Other Therapeutic Activities:  Pt was educated on PT POC, Diagnosis, Prognosis, pathomechanics as well as frequency and duration of scheduling future physical therapy appointments. Time was also taken on this day to answer all patient questions and participation in PT.  Reviewed appointment policy in detail with patient and patient verbalized understanding. Discussed with patient at length MD restriction of PROM only. Patient instructed to avoid yardwork activities such as driving lawnmower, cutting down trees. Patient voiced understanding.   5/28/19: Re-eval measurements taken for MD PN. Discussed remaining deficits and continuation of skilled PT at this time. Patient agreeable. Home Exercise Program:  Patient instructed in the following for HEP: pendulums, shrugs, scap squeeze, codmans, supine cane ER. Patient verbalized/demonstrated understanding and was issued written handout. 5/7/19: Added post capsule stretch to HEP. Patient instructed to get shoulder pulley for home use. Patient verbalized/demonstrated understanding and was issued written handout. Timed Code Treatment Minutes: 50    Total Treatment Minutes:  50    Assessment:  [] Patient tolerated treatment well [] Patient limited by fatigue  [x] Patient limited by pain  [] Patient limited by other medical complications  [x] Other:   Significant tightness noted with PROM in all directions. Prognosis: [x] Good [] Fair  [] Poor    Goals:    Short term goals  Time Frame for Short term goals: 3 weeks  Short term goal 1: Patient will be independent with HEP for prevention of reinjury. 5/28 MET  Short term goal 2: Patient will report 30% improvement with pain for ease with ADLs. 5/28 MET  Short term goal 3: Patient will tolerate 10 mins of AAROM exercises without increased pain for ease with dressing. 5/28 MET     Long term goals  Time Frame for Long term goals : at discharge  Long term goal 1: Increase R shoulder PROM to flex/abd=160, IR=70, and ER=70 for ease with dressing. 5/28 PROGRESSING TOWARDS  Long term goal 2:  Increase UE strength to 4+/5 for ease with lifting. 5/28 PROGRESSING TOWARDS  Long term goal 3: Patient will report 70% improvement for ease with ADLs. 5/28 MET    Patient Requires Follow-up: [x] Yes  [] No    Plan:   [x] Continue per plan of care [] Sade Zhao current plan (see comments)  [] Plan of care initiated [] Hold pending MD visit [] Discharge    Plan for Next Session:  Add above as stated. Begin full strengthening program as tolerated.      Electronically signed by:  Jordan Guerrero, [id number] Jacky Acosta

## 2019-06-10 ENCOUNTER — HOSPITAL ENCOUNTER (OUTPATIENT)
Dept: PHYSICAL THERAPY | Age: 69
Setting detail: THERAPIES SERIES
Discharge: HOME | End: 2019-06-10
Payer: MEDICARE

## 2019-06-10 PROCEDURE — 97110 THERAPEUTIC EXERCISES: CPT

## 2019-06-10 NOTE — FLOWSHEET NOTE
Physical Therapy Daily Treatment Note  Date:  6/10/2019    Patient Name:  Ese Talbot    :  1950  MRN: [id number]    Restrictions/Precautions: Fall Risk(low: pt reports no history of frequent falls. )    Pertinent Medical History:  Afib    Medical/Treatment Diagnosis Information:  · Diagnosis: Complete tear of R RTC   · Treatment Diagnosis: Decreased mobility and strength    Insurance/Certification information:  PT Insurance Information: Medicare (needs KX modifier)  Physician Information:  Referring Practitioner: Omega Husain MD  Plan of care signed (Y/N): sent for cosign  (received) PN sent  (received)    Visit# / total visits:    Pain level: 1/10     Functional Outcomes Measure: at eval  Test: quick dash   Score:  28    Progress Note: []  Yes  [x]  No  Next due by: Visit #10      History of Injury: Patient underwent R shoulder scope with SAD and RTC repair on 3/8/19. Has been wearing sling at all times as instructed. Took abduction pillow out yesterday as instructed by MD. OP report notes large full-thickness retracted tear of the supraspinatus. Continues to sleep in a recliner for the majority of the night. Subjective: notes easier time reaching into the back seat to pet the dog. Also able to pick cherries with both arms. 19 pt has been able to cut the grass, fish, put deodorant under the left arm  6/10/19  Was able to reach higher picking cherries over the weekend    Objective:  Observation: Updated 19  · Palpation: no TTP R AC joint area  · Observation: Patient no longer wearing sling. Good arm swing noted with ambulation. UT substitution noted with forward elevation.     · Scar: incision portals healed with no signs of infection  Test measurements:    ROM:  Date      Shldr flexion    Shldr abd     Shldr IR               Shldr ER   A P A P A P A P   Eval   40  60  30 at 45 degrees abd  15 at 45 degrees abd    19  112  120  50 at 90 degrees abd  50 at 90 degrees abd   5/28/19  135  120  60 at 90 degrees abd  60 at 90 degrees abd     Strength:  Date Shoulder flexion Shoulder abduction Shoulder IR Shoulder  ER Bicep   Eval 4/23 NT NT  NT  NT NT   5/28/19 3/5 3/5 4/5 3+/5 5/5   NT= not tested per MD protocol      Exercises:R shoulder scope with SAD and RTC repair on 3/8/19   Exercise/Equipment Resistance/Repetitions Other comments          5/31-6/14: initiate full strengthening   UE [place] flexion                    scaption 20x  20x Tactile cues   [place] flex             IR 20x  10x    Stand cane ext                      IR                   Pull across 10x  10x  10x    Tband row              Lat             Add             Triceps             IR             ER Green 15x  Green 15x  Green 15x  Green 15x  Green 15x  Green 15x    Bicep curls 3# 30x    Flexion wallslides 10x 5 sec Cueing to avoid UT substitiution        Supine cane ER                       Flexion                  Chest press 10x  3# 10x  3# 20x    Supine flexion AROM              Flexion  2# 2x10 R   2# 2x10 R    Supine serratus punch 2# 2x10 R    Supine rhythmic stabs at 90 2x 30 secs    SL sleeper stretch 3x 20 sec     SL abd        ER 1# 2x10 R  1# 2x10 R    Supine IR/ER JOSEPH 30/60/90 5x 5 sec holds    Rhythmic stab, multiangle elevation 4x30\"    Prone ext             row 1# 2x10 R  1# 2x10 R Increase wt        L SL scap PROM  10x each all directions    Shoulder PROM 4-way  Contract/relax ER 10x each     3x Per Protocol:   Flex/Abd to 180,  ER to tolerance   constant cueing needed for patient to relax with PROM   Glenohumeral mobs A/P, Inf Grade III 10x each          CP after exercises  At home today     Other Therapeutic Activities:  Pt was educated on PT POC, Diagnosis, Prognosis, pathomechanics as well as frequency and duration of scheduling future physical therapy appointments. Time was also taken on this day to answer all patient questions and participation in PT.  Reviewed appointment policy in detail with patient and patient verbalized understanding. Discussed with patient at length MD restriction of PROM only. Patient instructed to avoid yardwork activities such as driving lawnmower, cutting down trees. Patient voiced understanding.   5/28/19: Re-eval measurements taken for MD PN. Discussed remaining deficits and continuation of skilled PT at this time. Patient agreeable. Home Exercise Program:  Patient instructed in the following for HEP: pendulums, shrugs, scap squeeze, codmans, supine cane ER. Patient verbalized/demonstrated understanding and was issued written handout. 5/7/19: Added post capsule stretch to HEP. Patient instructed to get shoulder pulley for home use. Patient verbalized/demonstrated understanding and was issued written handout. Timed Code Treatment Minutes: 50    Total Treatment Minutes:  50    Assessment:  [] Patient tolerated treatment well [] Patient limited by fatigue  [x] Patient limited by pain  [] Patient limited by other medical complications  [x] Other:   Significant tightness noted with PROM in all directions. Prognosis: [x] Good [] Fair  [] Poor    Goals:    Short term goals  Time Frame for Short term goals: 3 weeks  Short term goal 1: Patient will be independent with HEP for prevention of reinjury. 5/28 MET  Short term goal 2: Patient will report 30% improvement with pain for ease with ADLs. 5/28 MET  Short term goal 3: Patient will tolerate 10 mins of AAROM exercises without increased pain for ease with dressing. 5/28 MET     Long term goals  Time Frame for Long term goals : at discharge  Long term goal 1: Increase R shoulder PROM to flex/abd=160, IR=70, and ER=70 for ease with dressing. 5/28 PROGRESSING TOWARDS  Long term goal 2:  Increase UE strength to 4+/5 for ease with lifting. 5/28 PROGRESSING TOWARDS  Long term goal 3: Patient will report 70% improvement for ease with ADLs. 5/28 MET    Patient Requires Follow-up: [x] Yes  []

## 2019-06-11 ENCOUNTER — HOSPITAL ENCOUNTER (OUTPATIENT)
Dept: PHYSICAL THERAPY | Age: 69
Setting detail: THERAPIES SERIES
Discharge: HOME | End: 2019-06-11
Payer: MEDICARE

## 2019-06-11 PROCEDURE — 97110 THERAPEUTIC EXERCISES: CPT

## 2019-06-11 NOTE — FLOWSHEET NOTE
Physical Therapy Daily Treatment Note  Date:  2019    Patient Name:  Zuleyka Nowak    :  1950  MRN: [id number]    Restrictions/Precautions: Fall Risk(low: pt reports no history of frequent falls. )    Pertinent Medical History:  Afib    Medical/Treatment Diagnosis Information:  · Diagnosis: Complete tear of R RTC   · Treatment Diagnosis: Decreased mobility and strength    Insurance/Certification information:  PT Insurance Information: Medicare (needs KX modifier)  Physician Information:  Referring Practitioner: Maggie Maddox MD  Plan of care signed (Y/N): sent for cosign  (received) PN sent  (received)    Visit# / total visits:    Pain level: 1/10     Functional Outcomes Measure: at eval  Test: quick dash   Score:  28    Progress Note: []  Yes  [x]  No  Next due by: Visit #10      History of Injury: Patient underwent R shoulder scope with SAD and RTC repair on 3/8/19. Has been wearing sling at all times as instructed. Took abduction pillow out yesterday as instructed by MD. OP report notes large full-thickness retracted tear of the supraspinatus. Continues to sleep in a recliner for the majority of the night. Subjective: notes easier time reaching into the back seat to pet the dog. Also able to pick cherries with both arms. 19 pt has been able to cut the grass, fish, put deodorant under the left arm  6/10/19  Was able to reach higher picking cherries over the weekend  19: arm continues to loosen up slowly. Objective:  Observation: Updated 19  · Palpation: no TTP R AC joint area  · Observation: Patient no longer wearing sling. Good arm swing noted with ambulation. UT substitution noted with forward elevation.     · Scar: incision portals healed with no signs of infection  Test measurements:    ROM:  Date      Shldr flexion    Shldr abd     Shldr IR               Shldr ER   A P A P A P A P   Eval   40  60  30 at 45 degrees abd  15 at 45 degrees abd 5/21/19  112  120  50 at 90 degrees abd  50 at 90 degrees abd   5/28/19  135  120  60 at 90 degrees abd  60 at 90 degrees abd   6/11/19  143  155  70 at 90 degrees abd  75 at 90 degrees abd     Strength:  Date Shoulder flexion Shoulder abduction Shoulder IR Shoulder  ER Bicep   Eval 4/23 NT NT  NT  NT NT   5/28/19 3/5 3/5 4/5 3+/5 5/5   NT= not tested per MD protocol      Exercises:R shoulder scope with SAD and RTC repair on 3/8/19   Exercise/Equipment Resistance/Repetitions Other comments          5/31-6/14: initiate full strengthening   UE [place] flexion                    scaption 20x  20x Tactile cues   [place]ley flex             IR 20x  10x    Stand cane ext                      IR                   Pull across 10x  10x  10x    Tband row              Lat             Add             Triceps             IR             ER Green 20x  Green 20x  Green 20x  Green 20x  Green 20x  Green 20x    Bicep curls 3# 30x    Flexion wallslides 10x 5 sec Cueing to avoid UT substitiution        Supine cane ER                       Flexion                  Chest press 10x  3# 10x  3# 20x    Supine flexion AROM              Flexion  2# 2x10 R   2# 2x10 R    Supine serratus punch 2# 2x10 R    SL sleeper stretch 3x 20 sec     SL abd        ER 1# 2x10 R  1# 2x10 R    Supine IR/ER JOSEPH 30/60/90 5x 5 sec holds    Rhythmic stab, multiangle elevation 4x 30\"    Prone ext             Row            abd 2# 2x10 R  2# 2x10 R  add         L SL scap PROM  10x each all directions    Shoulder PROM 4-way   10x each  Per Protocol:   Flex/Abd to 180,  ER to tolerance   constant cueing needed for patient to relax with PROM   Glenohumeral mobs A/P, Inf Grade III 10x each          CP after exercises  At home today     Other Therapeutic Activities:  Pt was educated on PT POC, Diagnosis, Prognosis, pathomechanics as well as frequency and duration of scheduling future physical therapy appointments.  Time was also taken on this day to answer all patient questions and participation in PT. Reviewed appointment policy in detail with patient and patient verbalized understanding. Discussed with patient at length MD restriction of PROM only. Patient instructed to avoid yardwork activities such as driving lawnmower, cutting down trees. Patient voiced understanding.   5/28/19: Re-eval measurements taken for MD PN. Discussed remaining deficits and continuation of skilled PT at this time. Patient agreeable. Home Exercise Program:  Patient instructed in the following for HEP: pendulums, shrugs, scap squeeze, codmans, supine cane ER. Patient verbalized/demonstrated understanding and was issued written handout. 5/7/19: Added post capsule stretch to HEP. Patient instructed to get shoulder pulley for home use. Patient verbalized/demonstrated understanding and was issued written handout. Timed Code Treatment Minutes: 50    Total Treatment Minutes:  50    Assessment:  [] Patient tolerated treatment well [] Patient limited by fatigue  [x] Patient limited by pain  [] Patient limited by other medical complications  [x] Other:   Significant tightness noted with PROM in all directions. Prognosis: [x] Good [] Fair  [] Poor    Goals:    Short term goals  Time Frame for Short term goals: 3 weeks  Short term goal 1: Patient will be independent with HEP for prevention of reinjury. 5/28 MET  Short term goal 2: Patient will report 30% improvement with pain for ease with ADLs. 5/28 MET  Short term goal 3: Patient will tolerate 10 mins of AAROM exercises without increased pain for ease with dressing. 5/28 MET     Long term goals  Time Frame for Long term goals : at discharge  Long term goal 1: Increase R shoulder PROM to flex/abd=160, IR=70, and ER=70 for ease with dressing. 5/28 PROGRESSING TOWARDS  Long term goal 2:  Increase UE strength to 4+/5 for ease with lifting. 5/28 PROGRESSING TOWARDS  Long term goal 3: Patient will report 70% improvement for ease with ADLs. 5/28

## 2019-06-13 ENCOUNTER — HOSPITAL ENCOUNTER (OUTPATIENT)
Dept: PHYSICAL THERAPY | Age: 69
Setting detail: THERAPIES SERIES
Discharge: HOME | End: 2019-06-13
Payer: MEDICARE

## 2019-06-13 PROCEDURE — 97110 THERAPEUTIC EXERCISES: CPT

## 2019-06-13 NOTE — FLOWSHEET NOTE
Physical Therapy Daily Treatment Note  Date:  2019    Patient Name:  Chelsea Garrison    :  1950  MRN: [id number]    Restrictions/Precautions: Fall Risk(low: pt reports no history of frequent falls. )    Pertinent Medical History:  Afib    Medical/Treatment Diagnosis Information:  · Diagnosis: Complete tear of R RTC   · Treatment Diagnosis: Decreased mobility and strength    Insurance/Certification information:  PT Insurance Information: Medicare (needs KX modifier)  Physician Information:  Referring Practitioner: Corinna Miranda MD  Plan of care signed (Y/N): sent for cosign  (received) PN sent  (received)    Visit# / total visits:    Pain level: 1/10     Functional Outcomes Measure: at eval  Test: quick dash   Score:  28    Progress Note: []  Yes  [x]  No  Next due by: Visit #10      History of Injury: Patient underwent R shoulder scope with SAD and RTC repair on 3/8/19. Has been wearing sling at all times as instructed. Took abduction pillow out yesterday as instructed by MD. OP report notes large full-thickness retracted tear of the supraspinatus. Continues to sleep in a recliner for the majority of the night. Subjective: notes easier time reaching into the back seat to pet the dog. Also able to pick cherries with both arms. 19 pt has been able to cut the grass, fish, put deodorant under the left arm  6/10/19  Was able to reach higher picking cherries over the weekend  19: arm continues to loosen up slowly. 19: Just some stiffness in the arm. Objective:  Observation: Updated 19  · Palpation: no TTP R AC joint area  · Observation: Patient no longer wearing sling. Good arm swing noted with ambulation. UT substitution noted with forward elevation.     · Scar: incision portals healed with no signs of infection  Test measurements:    ROM:  Date      Shldr flexion    Shldr abd     Shldr IR               Shldr ER   A P A P A P A P   Eval   40  60  30 at 45 degrees abd  15 at 45 degrees abd    5/21/19  112  120  50 at 90 degrees abd  50 at 90 degrees abd   5/28/19  135  120  60 at 90 degrees abd  60 at 90 degrees abd   6/11/19  143  155  70 at 90 degrees abd  75 at 90 degrees abd     Strength:  Date Shoulder flexion Shoulder abduction Shoulder IR Shoulder  ER Bicep   Eval 4/23 NT NT  NT  NT NT   5/28/19 3/5 3/5 4/5 3+/5 5/5   NT= not tested per MD protocol      Exercises:R shoulder scope with SAD and RTC repair on 3/8/19   Exercise/Equipment Resistance/Repetitions Other comments          5/31-6/14: initiate full strengthening   UE [place] flexion                    scaption 20x  20x Tactile cues   [place]ulley flex             IR 20x  10x    Stand cane ext                      IR                   Pull across 10x  10x  10x    Tband row              Lat             Add             Triceps             IR             ER Green 20x  Green 20x  Green 20x  Green 20x  Green 20x  Green 20x    Bicep curls 3# 30x    Flexion wallslides 10x 5 sec Cueing to avoid UT substitiution        Supine cane ER                       Flexion                  Chest press 10x  3# 10x  3# 20x    Supine flexion AROM              Flexion  2# 2x10 R   2# 2x10 R    Supine serratus punch 2# 2x10 R    SL sleeper stretch 3x 20 sec     SL abd        ER 1# 2x10 R  1# 2x10 R    Supine IR/ER JOSEPH 30/60/90 5x 5 sec holds    Rhythmic stab, multiangle elevation 4x 30\"    Prone ext             Row            abd 2# 2x10 R  2# 2x10 R  add         L SL scap PROM  10x each all directions    Shoulder PROM 4-way   10x each  Per Protocol:   Flex/Abd to 180,  ER to tolerance   constant cueing needed for patient to relax with PROM   Glenohumeral mobs A/P, Inf Grade III 10x each          CP after exercises  At home today     Other Therapeutic Activities:  Pt was educated on PT POC, Diagnosis, Prognosis, pathomechanics as well as frequency and duration of scheduling future physical therapy appointments.  Time was also taken on this day to answer all patient questions and participation in PT. Reviewed appointment policy in detail with patient and patient verbalized understanding. Discussed with patient at length MD restriction of PROM only. Patient instructed to avoid yardwork activities such as driving lawnmower, cutting down trees. Patient voiced understanding.   5/28/19: Re-eval measurements taken for MD PN. Discussed remaining deficits and continuation of skilled PT at this time. Patient agreeable. Home Exercise Program:  Patient instructed in the following for HEP: pendulums, shrugs, scap squeeze, codmans, supine cane ER. Patient verbalized/demonstrated understanding and was issued written handout. 5/7/19: Added post capsule stretch to HEP. Patient instructed to get shoulder pulley for home use. Patient verbalized/demonstrated understanding and was issued written handout. Timed Code Treatment Minutes: 50    Total Treatment Minutes:  50    Assessment:  [] Patient tolerated treatment well [] Patient limited by fatigue  [x] Patient limited by pain  [] Patient limited by other medical complications  [x] Other:   Significant tightness noted with PROM in all directions. Prognosis: [x] Good [] Fair  [] Poor    Goals:    Short term goals  Time Frame for Short term goals: 3 weeks  Short term goal 1: Patient will be independent with HEP for prevention of reinjury. 5/28 MET  Short term goal 2: Patient will report 30% improvement with pain for ease with ADLs. 5/28 MET  Short term goal 3: Patient will tolerate 10 mins of AAROM exercises without increased pain for ease with dressing. 5/28 MET     Long term goals  Time Frame for Long term goals : at discharge  Long term goal 1: Increase R shoulder PROM to flex/abd=160, IR=70, and ER=70 for ease with dressing. 5/28 PROGRESSING TOWARDS  Long term goal 2:  Increase UE strength to 4+/5 for ease with lifting. 5/28 PROGRESSING TOWARDS  Long term goal 3: Patient will report 70% improvement for ease with ADLs. 5/28 MET    Patient Requires Follow-up: [x] Yes  [] No    Plan:   [x] Continue per plan of care [] Alter current plan (see comments)  [] Plan of care initiated [] Hold pending MD visit [] Discharge    Plan for Next Session:  Add above as stated. Begin full strengthening program as tolerated.      Electronically signed by:  Fausto Barlow, [id number] [address]

## 2019-06-18 ENCOUNTER — HOSPITAL ENCOUNTER (OUTPATIENT)
Dept: PHYSICAL THERAPY | Age: 69
Setting detail: THERAPIES SERIES
Discharge: HOME | End: 2019-06-18
Payer: MEDICARE

## 2019-06-18 PROCEDURE — 97110 THERAPEUTIC EXERCISES: CPT

## 2019-06-18 NOTE — FLOWSHEET NOTE
Physical Therapy Daily Treatment Note  Date:  2019    Patient Name:  Izaiah Good    :  1950  MRN: [id number]    Restrictions/Precautions: Fall Risk(low: pt reports no history of frequent falls. )    Pertinent Medical History:  Afib    Medical/Treatment Diagnosis Information:  · Diagnosis: Complete tear of R RTC   · Treatment Diagnosis: Decreased mobility and strength    Insurance/Certification information:  PT Insurance Information: Medicare (needs KX modifier)  Physician Information:  Referring Practitioner: Ladan Shepherd MD  Plan of care signed (Y/N): sent for cosign  (received) PN sent  (received)    Visit# / total visits:    Pain level: 1/10     Functional Outcomes Measure: at eval  Test: quick dash   Score:  28    Progress Note: []  Yes  [x]  No  Next due by: Visit #10      History of Injury: Patient underwent R shoulder scope with SAD and RTC repair on 3/8/19. Has been wearing sling at all times as instructed. Took abduction pillow out yesterday as instructed by MD. OP report notes large full-thickness retracted tear of the supraspinatus. Continues to sleep in a recliner for the majority of the night. Subjective: notes easier time reaching into the back seat to pet the dog. Also able to pick cherries with both arms. 19 pt has been able to cut the grass, fish, put deodorant under the left arm  6/10/19  Was able to reach higher picking cherries over the weekend  19: arm continues to loosen up slowly. 19: Just some stiffness in the arm.   19: easier time reaching over to put on seatbelt. Objective:  Observation: Updated 19  · Palpation: no TTP R AC joint area  · Observation: Patient no longer wearing sling. Good arm swing noted with ambulation. UT substitution noted with forward elevation.     · Scar: incision portals healed with no signs of infection  Test measurements:    ROM:  Date      Shldr flexion    Shldr abd     Shldr IR Shldr ER   A P A P A P A P   Eval 4/23  40  60  30 at 45 degrees abd  15 at 45 degrees abd    5/21/19  112  120  50 at 90 degrees abd  50 at 90 degrees abd   5/28/19  135  120  60 at 90 degrees abd  60 at 90 degrees abd   6/11/19  143  155  70 at 90 degrees abd  75 at 90 degrees abd     Strength:  Date Shoulder flexion Shoulder abduction Shoulder IR Shoulder  ER Bicep   Eval 4/23 NT NT  NT  NT NT   5/28/19 3/5 3/5 4/5 3+/5 5/5   NT= not tested per MD protocol      Exercises:R shoulder scope with SAD and RTC repair on 3/8/19   Exercise/Equipment Resistance/Repetitions Other comments          5/31-6/14: initiate full strengthening   UE [place] flexion                    scaption 20x  20x Tactile cues   Pulley flex             IR 20x  10x    Stand cane ext                      IR                   Pull across 10x  10x  10x    Tband row              Lat             Add             Triceps             IR             ER Green 20x  Green 20x  Green 20x  Green 20x  Green 20x  Green 20x    Bicep curls 4# 30x    Flexion wallslides  10x 5 sec Add palm lift off next visit. Supine cane ER                       Flexion                  Chest press 10x  3# 10x  3# 20x    Supine flexion AROM              Flexion  2# 2x10 R   2# 2x10 R    Supine serratus punch 2# 2x10 R    SL sleeper stretch 3x 20 sec     SL abd        ER 1# 2x10 R  1# 2x10 R    Supine IR/ER JOSEPH 30/60/90 5x 5 sec holds    Rhythmic stab, multiangle elevation 4x 30\"    Prone ext             Row            abd 2# 2x10 R  2# 2x10 R  PT assist to achieve end range.   Pt hold at end range 5 sec, 10x         L SL scap PROM  10x each all directions    Shoulder PROM 4-way   10x each  Per Protocol:   Flex/Abd to 180,  ER to tolerance   constant cueing needed for patient to relax with PROM   Glenohumeral mobs A/P, Inf Grade III 10x each          CP after exercises  At home today     Other Therapeutic Activities:  Pt was educated on PT POC, Diagnosis, Prognosis, pathomechanics as well as frequency and duration of scheduling future physical therapy appointments. Time was also taken on this day to answer all patient questions and participation in PT. Reviewed appointment policy in detail with patient and patient verbalized understanding. Discussed with patient at length MD restriction of PROM only. Patient instructed to avoid yardwork activities such as driving lawnmower, cutting down trees. Patient voiced understanding.   5/28/19: Re-eval measurements taken for MD PN. Discussed remaining deficits and continuation of skilled PT at this time. Patient agreeable. Home Exercise Program:  Patient instructed in the following for HEP: pendulums, shrugs, scap squeeze, codmans, supine cane ER. Patient verbalized/demonstrated understanding and was issued written handout. 5/7/19: Added post capsule stretch to HEP. Patient instructed to get shoulder pulley for home use. Patient verbalized/demonstrated understanding and was issued written handout. Timed Code Treatment Minutes: 55    Total Treatment Minutes:  55    Assessment:  [] Patient tolerated treatment well [] Patient limited by fatigue  [x] Patient limited by pain  [] Patient limited by other medical complications  [x] Other:   Poor scapulohumeral rhythm noted     Prognosis: [x] Good [] Fair  [] Poor    Goals:    Short term goals  Time Frame for Short term goals: 3 weeks  Short term goal 1: Patient will be independent with HEP for prevention of reinjury. 5/28 MET  Short term goal 2: Patient will report 30% improvement with pain for ease with ADLs. 5/28 MET  Short term goal 3: Patient will tolerate 10 mins of AAROM exercises without increased pain for ease with dressing. 5/28 MET     Long term goals  Time Frame for Long term goals : at discharge  Long term goal 1: Increase R shoulder PROM to flex/abd=160, IR=70, and ER=70 for ease with dressing. 5/28 PROGRESSING TOWARDS  Long term goal 2:  Increase UE strength to 4+/5

## 2019-06-20 ENCOUNTER — HOSPITAL ENCOUNTER (OUTPATIENT)
Dept: PHYSICAL THERAPY | Age: 69
Setting detail: THERAPIES SERIES
Discharge: HOME | End: 2019-06-20
Payer: MEDICARE

## 2019-06-20 PROCEDURE — 97110 THERAPEUTIC EXERCISES: CPT

## 2019-06-20 NOTE — PROGRESS NOTES
Outpatient Physical Therapy  [] [facility]    Phone: [phone]   Fax: [phone]   [] [facility]  Phone: [phone]   Fax: [phone]  [x] [facility]              Phone: [phone]   Fax: [phone]     Physical Therapy Progress/Discharge Note  Date: 2019        Patient Name:  Sharmin Loredo    :  1950  MRN: [id number]    Diagnosis:  Complete tear of R RTC  Treatment Diagnosis:  Decreased mobility and strength    Insurance/Certification information:  Medicare  Referring Physician:  Melania Martinez MD    Visit# / total visits:        Plan of Care/Treatment to date:  [x] Therapeutic Exercise    [x] Modalities:  [x] Therapeutic Activity     [] Ultrasound  [] Electrical Stimulation  [] Gait Training      [] Cervical Traction    [] Lumbar Traction  [] Neuromuscular Re-education  [x] Cold/hotpack [] Iontophoresis  [x] Instruction in HEP      Other:  [x] Manual Therapy       []    [] Aquatic Therapy       []                    ? Significant Findings At Last Visit/Comments:    Subjective:  notes easier time reaching into the back seat to pet the dog. Also able to pick cherries with both arms. 19 pt has been able to cut the grass, fish, put deodorant under the left arm  6/10/19  Was able to reach higher picking cherries over the weekend  19: arm continues to loosen up slowly. 19: Just some stiffness in the arm.   19: easier time reaching over to put on seatbelt. 19: sees MD on Monday. Patient reports 80% improvement overall. Still having some difficulty sleeping on R side for prolonged periods and reaching in the back seat of the car. Objective:  · Observation:Updated 19  § Palpation: no TTP R AC joint area  § Observation: Patient no longer wearing sling. Good arm swing noted with ambulation. UT substitution noted with forward elevation.   Decreased scapulohumeral rhythm on R.  § Scar: incision portals healed with no signs of infection  · Test measurements:    ROM:  Date                    Shldr flexion             Shldr abd                   Shldr IR                     Shldr ER    A P A P A P A P   Eval 4/23   40   60   30 at 45 degrees abd   15 at 45 degrees abd    5/21/19   112   120   50 at 90 degrees abd   50 at 90 degrees abd   5/28/19   135   120   60 at 90 degrees abd   60 at 90 degrees abd   6/11/19   143   155   70 at 90 degrees abd   75 at 90 degrees abd   6/20/19 122 153 120 155   70 at 90 degrees abd   78 at 90 degrees abd      Strength:  Date Shoulder flexion Shoulder abduction Shoulder IR Shoulder  ER Bicep   Eval 4/23 NT NT  NT  NT NT   5/28/19 3/5 3/5 4/5 3+/5 5/5   6/20/19 4/5 4/5 5/5 4/5 5/5   NT= not tested per MD protocol    Assessment:  Summary: Patient is steadily progressing. End range of motion is tight but improving. Strength is also coming along. Scapulohumeral rhythm needs improvement. Believe patient would benefit from continued PT to progress to independent HEP. Patient's response to treatment: tolerating well. Progress towards goals:     Goals:    Short term goals  Time Frame for Short term goals: 3 weeks  Short term goal 1: Patient will be independent with HEP for prevention of reinjury. 5/28 MET  Short term goal 2: Patient will report 30% improvement with pain for ease with ADLs. 5/28 MET  Short term goal 3: Patient will tolerate 10 mins of AAROM exercises without increased pain for ease with dressing. 5/28 MET     Long term goals  Time Frame for Long term goals : at discharge  Long term goal 1: Increase R shoulder PROM to flex/abd=160, IR=70, and ER=70 for ease with dressing. 6/20 PROGRESSING TOWARDS  Long term goal 2: Increase UE strength to 4+/5 for ease with lifting. 6/20 PROGRESSING TOWARDS  Long term goal 3: Patient will report 70% improvement for ease with ADLs. 5/28 MET    Current Frequency/Duration:  # Days per week: [] 1 day # Weeks: [] 1 week [] 4 weeks      [x] 2 days?    [] 2 weeks [] 5 weeks      [x] 3 days   [] 3 weeks [x] 6 weeks     Rehab Potential: [x] Excellent [] Good [] Fair  [] Poor     Goal Status:  [] Achieved [x] Partially Achieved  [] Not Achieved     Patient Status: [] Continue per initial plan of Care     [] Patient now discharged     [x] Additional visits requested, Please re-certify for additional visits:      Requested frequency/duration:  2-3x/week for 4 weeks    Electronically signed by:  Radha Calix, [id number] [facility]    If you have any questions or concerns, please don't hesitate to call.   Thank you for your referral.    Physician Signature:________________________________Date:__________________  By signing above, therapists plan is approved by physician

## 2019-06-20 NOTE — FLOWSHEET NOTE
UT substitution noted with forward elevation. Decreased scapulohumeral rhythm on R.  · Scar: incision portals healed with no signs of infection  Test measurements:    ROM:  Date      Shldr flexion    Shldr abd     Shldr IR               Shldr ER   A P A P A P A P   Eval 4/23  40  60  30 at 45 degrees abd  15 at 45 degrees abd    5/21/19  112  120  50 at 90 degrees abd  50 at 90 degrees abd   5/28/19  135  120  60 at 90 degrees abd  60 at 90 degrees abd   6/11/19  143  155  70 at 90 degrees abd  75 at 90 degrees abd   6/20/19 122 153 120 155  70 at 90 degrees abd  78 at 90 degrees abd     Strength:  Date Shoulder flexion Shoulder abduction Shoulder IR Shoulder  ER Bicep   Eval 4/23 NT NT  NT  NT NT   5/28/19 3/5 3/5 4/5 3+/5 5/5   6/20/19 4/5 4/5 5/5 4/5 5/5   NT= not tested per MD protocol      Exercises:R shoulder scope with SAD and RTC repair on 3/8/19   Exercise/Equipment Resistance/Repetitions Other comments          5/31-6/14: initiate full strengthening   UE [place] flexion                    scaption 20x  20x Tactile cues   Pulley flex             IR 20x  10x    Stand cane ext                      IR                   Pull across 10x  10x  10x    Tband row              Lat             Add             Triceps             IR             ER [place] 20x  Orange 20x  Orange 20x  Orange 20x  Orange 20x  Orange 20x    Bicep curls 4# 30x    Flexion wallslides with palm lift off 10x          Supine cane ER                       Flexion                  Chest press 10x  3# 10x  3# 20x    Supine flexion AROM              Flexion  2# 2x10 R   2# 2x10 R    Supine serratus punch 2# 2x10 R    SL sleeper stretch 3x 20 sec     SL abd        ER 2# 2x10 R  2# 2x10 R    Supine ER JOSEPH 30/60/90 5x 5 sec holds Progress to standing position   Rhythmic stab, multiangle elevation 4x 30\"    Prone ext             Row            abd 2# 2x10 R  2# 2x10 R  PT assist to achieve end range.   Pt hold at end range 5 sec, 10x         L SL scap PROM  10x each all directions    Shoulder PROM 4-way   10x each  Per Protocol:   Flex/Abd to 180,  ER to tolerance   constant cueing needed for patient to relax with PROM   Glenohumeral mobs A/P, Inf Grade III 10x each          CP after exercises  At home today     Other Therapeutic Activities:  Pt was educated on PT POC, Diagnosis, Prognosis, pathomechanics as well as frequency and duration of scheduling future physical therapy appointments. Time was also taken on this day to answer all patient questions and participation in PT. Reviewed appointment policy in detail with patient and patient verbalized understanding. Discussed with patient at length MD restriction of PROM only. Patient instructed to avoid yardwork activities such as driving lawnmower, cutting down trees. Patient voiced understanding.   5/28/19: Re-eval measurements taken for MD PN. Discussed remaining deficits and continuation of skilled PT at this time. Patient agreeable. Home Exercise Program:  Patient instructed in the following for HEP: pendulums, shrugs, scap squeeze, codmans, supine cane ER. Patient verbalized/demonstrated understanding and was issued written handout. 5/7/19: Added post capsule stretch to HEP. Patient instructed to get shoulder pulley for home use. Patient verbalized/demonstrated understanding and was issued written handout. Timed Code Treatment Minutes: 55    Total Treatment Minutes:  55    Assessment:  [] Patient tolerated treatment well [] Patient limited by fatigue  [x] Patient limited by pain  [] Patient limited by other medical complications  [x] Other:   Poor scapulohumeral rhythm noted     Prognosis: [x] Good [] Fair  [] Poor    Goals:    Short term goals  Time Frame for Short term goals: 3 weeks  Short term goal 1: Patient will be independent with HEP for prevention of reinjury.  5/28 MET  Short term goal 2: Patient will report 30% improvement with pain for ease with ADLs. 5/28 MET  Short term goal 3: Patient will tolerate 10 mins of AAROM exercises without increased pain for ease with dressing. 5/28 MET     Long term goals  Time Frame for Long term goals : at discharge  Long term goal 1: Increase R shoulder PROM to flex/abd=160, IR=70, and ER=70 for ease with dressing. 6/20 PROGRESSING TOWARDS  Long term goal 2: Increase UE strength to 4+/5 for ease with lifting. 6/20 PROGRESSING TOWARDS  Long term goal 3: Patient will report 70% improvement for ease with ADLs. 5/28 MET    Patient Requires Follow-up: [x] Yes  [] No    Plan:   [x] Continue per plan of care [] Alter current plan (see comments)  [] Plan of care initiated [] Hold pending MD visit [] Discharge    Plan for Next Session:  Continue to improve scapulohumeral rhythm.      Electronically signed by:  Dorothy Church, [id number] [address]

## 2019-06-24 ENCOUNTER — HOSPITAL ENCOUNTER (OUTPATIENT)
Dept: PHYSICAL THERAPY | Age: 69
Setting detail: THERAPIES SERIES
Discharge: HOME | End: 2019-06-24
Payer: MEDICARE

## 2019-06-24 ENCOUNTER — OFFICE VISIT (OUTPATIENT)
Dept: ORTHOPEDIC SURGERY | Age: 69
End: 2019-06-24
Payer: MEDICARE

## 2019-06-24 VITALS
RESPIRATION RATE: 17 BRPM | WEIGHT: 217.15 LBS | DIASTOLIC BLOOD PRESSURE: 83 MMHG | BODY MASS INDEX: 26.44 KG/M2 | HEART RATE: 62 BPM | SYSTOLIC BLOOD PRESSURE: 150 MMHG | HEIGHT: 76 IN

## 2019-06-24 DIAGNOSIS — M75.121 COMPLETE TEAR OF RIGHT ROTATOR CUFF, UNSPECIFIED WHETHER TRAUMATIC: Primary | ICD-10-CM

## 2019-06-24 PROCEDURE — 4004F PT TOBACCO SCREEN RCVD TLK: CPT | Performed by: ORTHOPAEDIC SURGERY

## 2019-06-24 PROCEDURE — G8427 DOCREV CUR MEDS BY ELIG CLIN: HCPCS | Performed by: ORTHOPAEDIC SURGERY

## 2019-06-24 PROCEDURE — G8419 CALC BMI OUT NRM PARAM NOF/U: HCPCS | Performed by: ORTHOPAEDIC SURGERY

## 2019-06-24 PROCEDURE — 3017F COLORECTAL CA SCREEN DOC REV: CPT | Performed by: ORTHOPAEDIC SURGERY

## 2019-06-24 PROCEDURE — 4040F PNEUMOC VAC/ADMIN/RCVD: CPT | Performed by: ORTHOPAEDIC SURGERY

## 2019-06-24 PROCEDURE — 99213 OFFICE O/P EST LOW 20 MIN: CPT | Performed by: ORTHOPAEDIC SURGERY

## 2019-06-24 PROCEDURE — 1123F ACP DISCUSS/DSCN MKR DOCD: CPT | Performed by: ORTHOPAEDIC SURGERY

## 2019-06-24 PROCEDURE — 97110 THERAPEUTIC EXERCISES: CPT

## 2019-06-24 NOTE — PROGRESS NOTES
History:  Charmayne Bleak is here for follow up after right shoulder arthroscopic surgery. Surgery date was 3/8/19. Findings at surgery: large rotator cuff tear. The patient's pain is rated at 0/10. He has been doing PT at [facility]. Physical Examination:  BP (!) 150/83   Pulse 62   Resp 17   Ht 6' 3.98\" (1.93 m)   Wt 217 lb 2.5 oz (98.5 kg)   BMI 26.44 kg/m²      Patient is awake, alert, and in no acute distress. Sensation is intact to light touch in the axillary, median, radial, and ulnar nerve distribution bilaterally. The EPL, FPL, and interossei are grossly intact bilaterally. The Bilateral upper extremities are warm and well-perfused with brisk capillary refill. Right shoulder active forward flexion 150-160, passive 150-160, ER 60, IR L4   Left shoulder active forward flexion 180, ER 80, IR L4   5-/5 Supraspinatus, External rotation, Internal rotation       Assessment:   3 1/2 months status post right shoulder arthroscopy, SAD, RTC repair      Plan:   Continue / finish physical therapy. Continue HEP.    NSAIDs prn. Follow up in 2 months for evaluation of progress or prn if problems.

## 2019-06-24 NOTE — FLOWSHEET NOTE
Physical Therapy Daily Treatment Note  Date:  2019    Patient Name:  Anat Ríos    :  1950  MRN: [id number]    Restrictions/Precautions: Fall Risk(low: pt reports no history of frequent falls. )    Pertinent Medical History:  Afib    Medical/Treatment Diagnosis Information:  · Diagnosis: Complete tear of R RTC   · Treatment Diagnosis: Decreased mobility and strength    Insurance/Certification information:  PT Insurance Information: Medicare (needs KX modifier)  Physician Information:  Referring Practitioner: Emil Holder MD  Plan of care signed (Y/N): sent for cosign  (received) PN sent  (received), PN sent     Visit# / total visits:    Pain level: 1/10     Functional Outcomes Measure: at eval  Test: quick dash   Score:  28    Progress Note: []  Yes  [x]  No  Next due by: Visit #10      History of Injury: Patient underwent R shoulder scope with SAD and RTC repair on 3/8/19. Has been wearing sling at all times as instructed. Took abduction pillow out yesterday as instructed by MD. OP report notes large full-thickness retracted tear of the supraspinatus. Continues to sleep in a recliner for the majority of the night. Subjective: notes easier time reaching into the back seat to pet the dog. Also able to pick cherries with both arms. 19 pt has been able to cut the grass, fish, put deodorant under the left arm  6/10/19  Was able to reach higher picking cherries over the weekend  19: arm continues to loosen up slowly. 19: Just some stiffness in the arm.   19: easier time reaching over to put on seatbelt. 19: sees MD on Monday. Patient reports 80% improvement overall. Still having some difficulty sleeping on R side for prolonged periods and reaching in the back seat of the car.   19 Saw MD this morning. MD was pleased with ROM.  To return for f/u in 2 months    Objective:  Observation: Updated 19  · Palpation: no TTP R AC joint area  · Observation: Patient no longer wearing sling. Good arm swing noted with ambulation. UT substitution noted with forward elevation.   Decreased scapulohumeral rhythm on R.  · Scar: incision portals healed with no signs of infection  Test measurements:    ROM:  Date      Shldr flexion    Shldr abd     Shldr IR               Shldr ER   A P A P A P A P   Eval 4/23  40  60  30 at 45 degrees abd  15 at 45 degrees abd    5/21/19  112  120  50 at 90 degrees abd  50 at 90 degrees abd   5/28/19  135  120  60 at 90 degrees abd  60 at 90 degrees abd   6/11/19  143  155  70 at 90 degrees abd  75 at 90 degrees abd   6/20/19 122 153 120 155  70 at 90 degrees abd  78 at 90 degrees abd     Strength:  Date Shoulder flexion Shoulder abduction Shoulder IR Shoulder  ER Bicep   Eval 4/23 NT NT  NT  NT NT   5/28/19 3/5 3/5 4/5 3+/5 5/5   6/20/19 4/5 4/5 5/5 4/5 5/5   NT= not tested per MD protocol      Exercises:R shoulder scope with SAD and RTC repair on 3/8/19   Exercise/Equipment Resistance/Repetitions Other comments          5/31-6/14: initiate full strengthening   UE [place] flexion                    scaption 20x  20x Tactile cues   [place] flex             IR 20x  10x    Stand cane ext                      IR                   Pull across 10x  10x  10x    Tband row              Lat             Add             Triceps             IR             ER Orange 20x  Orange 20x  Orange 20x  Orange 20x  Orange 20x  Orange 20x    Bicep curls 4# 30x    Flexion wallslides with palm lift off 20x          Supine cane ER                       Flexion                  Chest press 10x  3# 10x  3# 20x    Supine flexion AROM              Flexion  2# 2x10 R   2# 2x10 R    Supine serratus punch 2# 2x10 R    SL sleeper stretch 3x 20 sec     SL abd        ER 2# 2x10 R  2# 2x10 R    Standing ER JOSEPH 30/60/90 5x 5 sec holds    Rhythmic stab, multiangle elevation 4x 30\"    Prone ext             Row            abd 2# 2x10 R  2# 2x10 R  PT assist to achieve end range. Pt hold at end range 5 sec, 10x         L SL scap PROM  10x each all directions    Shoulder PROM 4-way   10x each  Per Protocol:   Flex/Abd to 180,  ER to tolerance   constant cueing needed for patient to relax with PROM   Glenohumeral mobs A/P, Inf Grade III 10x each          CP after exercises  At home today     Other Therapeutic Activities:  Pt was educated on PT POC, Diagnosis, Prognosis, pathomechanics as well as frequency and duration of scheduling future physical therapy appointments. Time was also taken on this day to answer all patient questions and participation in PT. Reviewed appointment policy in detail with patient and patient verbalized understanding. Discussed with patient at length MD restriction of PROM only. Patient instructed to avoid yardwork activities such as driving lawnmower, cutting down trees. Patient voiced understanding.   5/28/19: Re-eval measurements taken for MD PN. Discussed remaining deficits and continuation of skilled PT at this time. Patient agreeable. Home Exercise Program:  Patient instructed in the following for HEP: pendulums, shrugs, scap squeeze, codmans, supine cane ER. Patient verbalized/demonstrated understanding and was issued written handout. 5/7/19: Added post capsule stretch to HEP. Patient instructed to get shoulder pulley for home use. Patient verbalized/demonstrated understanding and was issued written handout. Timed Code Treatment Minutes: 50    Total Treatment Minutes:  50    Assessment:  [] Patient tolerated treatment well [] Patient limited by fatigue  [x] Patient limited by pain  [] Patient limited by other medical complications  [x] Other:   Poor scapulohumeral rhythm noted     Prognosis: [x] Good [] Fair  [] Poor    Goals:    Short term goals  Time Frame for Short term goals: 3 weeks  Short term goal 1: Patient will be independent with HEP for prevention of reinjury.  5/28 MET  Short term goal 2: Patient will report 30%

## 2019-06-28 ENCOUNTER — HOSPITAL ENCOUNTER (OUTPATIENT)
Dept: PHYSICAL THERAPY | Age: 69
Setting detail: THERAPIES SERIES
Discharge: HOME | End: 2019-06-28
Payer: MEDICARE

## 2019-06-28 PROCEDURE — 97140 MANUAL THERAPY 1/> REGIONS: CPT

## 2019-06-28 PROCEDURE — 97110 THERAPEUTIC EXERCISES: CPT

## 2019-06-28 NOTE — FLOWSHEET NOTE
4x 30\"    Prone ext             Row            abd 3# 2x10 R  3# 2x10 R  PT assist to achieve end range. Pt hold at end range 5 sec, 10x         L SL scap PROM  10x each all directions    Shoulder PROM 4-way   10x each  Per Protocol:   Flex/Abd to 180,  ER to tolerance   constant cueing needed for patient to relax with PROM   Glenohumeral mobs A/P, Inf Grade III 10x each          CP after exercises  At home today     Other Therapeutic Activities:  Pt was educated on PT POC, Diagnosis, Prognosis, pathomechanics as well as frequency and duration of scheduling future physical therapy appointments. Time was also taken on this day to answer all patient questions and participation in PT. Reviewed appointment policy in detail with patient and patient verbalized understanding. Discussed with patient at length MD restriction of PROM only. Patient instructed to avoid yardwork activities such as driving lawnmower, cutting down trees. Patient voiced understanding.   5/28/19: Re-eval measurements taken for MD PN. Discussed remaining deficits and continuation of skilled PT at this time. Patient agreeable. Home Exercise Program:  Patient instructed in the following for HEP: pendulums, shrugs, scap squeeze, codmans, supine cane ER. Patient verbalized/demonstrated understanding and was issued written handout. 5/7/19: Added post capsule stretch to HEP. Patient instructed to get shoulder pulley for home use. Patient verbalized/demonstrated understanding and was issued written handout.     Timed Code Treatment Minutes: 50    Total Treatment Minutes:  50    Assessment:  [] Patient tolerated treatment well [] Patient limited by fatigue  [x] Patient limited by pain  [] Patient limited by other medical complications  [x] Other:   Poor scapulohumeral rhythm noted     Prognosis: [x] Good [] Fair  [] Poor    Goals:    Short term goals  Time Frame for Short term goals: 3 weeks  Short term goal 1: Patient will be independent with HEP for prevention of reinjury. 5/28 MET  Short term goal 2: Patient will report 30% improvement with pain for ease with ADLs. 5/28 MET  Short term goal 3: Patient will tolerate 10 mins of AAROM exercises without increased pain for ease with dressing. 5/28 MET     Long term goals  Time Frame for Long term goals : at discharge  Long term goal 1: Increase R shoulder PROM to flex/abd=160, IR=70, and ER=70 for ease with dressing. 6/20 PROGRESSING TOWARDS  Long term goal 2: Increase UE strength to 4+/5 for ease with lifting. 6/20 PROGRESSING TOWARDS  Long term goal 3: Patient will report 70% improvement for ease with ADLs. 5/28 MET    Patient Requires Follow-up: [x] Yes  [] No    Plan:   [x] Continue per plan of care [] Alter current plan (see comments)  [] Plan of care initiated [] Hold pending MD visit [] Discharge    Plan for Next Session:  Continue to improve scapulohumeral rhythm.      Electronically signed by:  Jaimee Mack, [address] [place] [address]

## 2019-07-03 ENCOUNTER — HOSPITAL ENCOUNTER (OUTPATIENT)
Dept: PHYSICAL THERAPY | Age: 69
Setting detail: THERAPIES SERIES
Discharge: HOME | End: 2019-07-03
Payer: MEDICARE

## 2019-07-03 PROCEDURE — 97140 MANUAL THERAPY 1/> REGIONS: CPT

## 2019-07-03 PROCEDURE — 97110 THERAPEUTIC EXERCISES: CPT

## 2019-07-03 NOTE — FLOWSHEET NOTE
(1/10) when I sleep\"  7/3/19:  Patient reports 0/10 most of the time, reporting discomfort \"only when I sleep\". Patient reports he is able to get to sleep without difficulty, however, after sleeping 2-3 hours he wakes due to achiness. He is able to get back to sleep, but then will wake every 1-1.5 hours, needing to change position. Patient reports he did try using ice last night before bed, did not notice much of a difference. Objective:  Observation: Updated 6/20/19  · Palpation: no TTP R AC joint area  · Observation: Patient no longer wearing sling. Good arm swing noted with ambulation. UT substitution noted with forward elevation.   Decreased scapulohumeral rhythm on R.  · Scar: incision portals healed with no signs of infection  Test measurements:    ROM:  Date      Shldr flexion    Shldr abd     Shldr IR               Shldr ER   A P A P A P A P   Eval 4/23  40  60  30 at 45 degrees abd  15 at 45 degrees abd    5/21/19  112  120  50 at 90 degrees abd  50 at 90 degrees abd   5/28/19  135  120  60 at 90 degrees abd  60 at 90 degrees abd   6/11/19  143  155  70 at 90 degrees abd  75 at 90 degrees abd   6/20/19 122 153 120 155  70 at 90 degrees abd  78 at 90 degrees abd     Strength:  Date Shoulder flexion Shoulder abduction Shoulder IR Shoulder  ER Bicep   Eval 4/23 NT NT  NT  NT NT   5/28/19 3/5 3/5 4/5 3+/5 5/5   6/20/19 4/5 4/5 5/5 4/5 5/5   NT= not tested per MD protocol      Exercises:R shoulder scope with SAD and RTC repair on 3/8/19   Exercise/Equipment Resistance/Repetitions Other comments          5/31-6/14: initiate full strengthening   UE [place] flexion                    scaption 20x  20x Tactile cues   P[place]ley flex             IR 20x  10x    Stand cane ext                      IR                   Pull across 10x  10x  10x    Tband row              Lat             Add             Triceps             IR             ER Orange 20x  Orange 20x  Orange 20x  Orange 20x  Orange 20x  Orange 20x    Bicep instructed in theraband lat pull down, add, triceps, IR, ER; green and purple theraband issued ; written instructions with pictures issued, patient able to demonstrate exercises. Timed Code Treatment Minutes: 50    Total Treatment Minutes:  50    Assessment:  [] Patient tolerated treatment well [] Patient limited by fatigue  [x] Patient limited by pain  [] Patient limited by other medical complications  [x] Other:   Scapulohumeral rhythm improving. Prognosis: [x] Good [] Fair  [] Poor    Goals:    Short term goals  Time Frame for Short term goals: 3 weeks  Short term goal 1: Patient will be independent with HEP for prevention of reinjury. 5/28 MET  Short term goal 2: Patient will report 30% improvement with pain for ease with ADLs. 5/28 MET  Short term goal 3: Patient will tolerate 10 mins of AAROM exercises without increased pain for ease with dressing. 5/28 MET     Long term goals  Time Frame for Long term goals : at discharge  Long term goal 1: Increase R shoulder PROM to flex/abd=160, IR=70, and ER=70 for ease with dressing. 6/20 PROGRESSING TOWARDS  Long term goal 2: Increase UE strength to 4+/5 for ease with lifting. 6/20 PROGRESSING TOWARDS  Long term goal 3: Patient will report 70% improvement for ease with ADLs. 5/28 MET    Patient Requires Follow-up: [x] Yes  [] No    Plan:   [x] Continue per plan of care [] Alter current plan (see comments)  [] Plan of care initiated [] Hold pending MD visit [] Discharge    Plan for Next Session:  Continue to improve scapulohumeral rhythm.      Electronically signed by:  Irlanda Remy, [id number] [facility]

## 2019-07-05 ENCOUNTER — HOSPITAL ENCOUNTER (OUTPATIENT)
Dept: PHYSICAL THERAPY | Age: 69
Setting detail: THERAPIES SERIES
Discharge: HOME | End: 2019-07-05
Payer: MEDICARE

## 2019-07-05 DIAGNOSIS — E78.2 MIXED HYPERLIPIDEMIA: ICD-10-CM

## 2019-07-05 PROCEDURE — 97140 MANUAL THERAPY 1/> REGIONS: CPT

## 2019-07-05 PROCEDURE — 97110 THERAPEUTIC EXERCISES: CPT

## 2019-07-05 NOTE — FLOWSHEET NOTE
Physical Therapy Daily Treatment Note  Date:  2019    Patient Name:  Navdeep Vidal    :  1950  MRN: [id number]    Restrictions/Precautions: Fall Risk(low: pt reports no history of frequent falls. )    Pertinent Medical History:  Afib    Medical/Treatment Diagnosis Information:  · Diagnosis: Complete tear of R RTC   · Treatment Diagnosis: Decreased mobility and strength    Insurance/Certification information:  PT Insurance Information: Medicare (needs KX modifier)  Physician Information:  Referring Practitioner: Nia Colin MD  Plan of care signed (Y/N): sent for cosign  (received) PN sent  (received), PN sent  (received)    Visit# / total visits:    Pain level: 0-1/10     Functional Outcomes Measure: at eval  Test: quick dash   Score:  28    Progress Note: []  Yes  [x]  No  Next due by: Visit #10      History of Injury: Patient underwent R shoulder scope with SAD and RTC repair on 3/8/19. Has been wearing sling at all times as instructed. Took abduction pillow out yesterday as instructed by MD. OP report notes large full-thickness retracted tear of the supraspinatus. Continues to sleep in a recliner for the majority of the night. Subjective: notes easier time reaching into the back seat to pet the dog. Also able to pick cherries with both arms. 19 pt has been able to cut the grass, fish, put deodorant under the left arm  6/10/19  Was able to reach higher picking cherries over the weekend  19: arm continues to loosen up slowly. 19: Just some stiffness in the arm.   19: easier time reaching over to put on seatbelt. 19: sees MD on Monday. Patient reports 80% improvement overall. Still having some difficulty sleeping on R side for prolonged periods and reaching in the back seat of the car.   19 Saw MD this morning. MD was pleased with ROM.  To return for f/u in 2 months  19:  Patient reports 0/10 pain most of the time, \"only a little (1/10) when I sleep\"  7/3/19:  Patient reports 0/10 most of the time, reporting discomfort \"only when I sleep\". Patient reports he is able to get to sleep without difficulty, however, after sleeping 2-3 hours he wakes due to achiness. He is able to get back to sleep, but then will wake every 1-1.5 hours, needing to change position. Patient reports he did try using ice last night before bed, did not notice much of a difference. 7/5/19:  Patient reports shoulder is doing okay, is limiting reaching to top shelf. Objective:  Observation: Updated 6/20/19  · Palpation: no TTP R AC joint area  · Observation: Patient no longer wearing sling. Good arm swing noted with ambulation. UT substitution noted with forward elevation.   Decreased scapulohumeral rhythm on R.  · Scar: incision portals healed with no signs of infection  Test measurements:    ROM:  Date      Shldr flexion    Shldr abd     Shldr IR               Shldr ER   A P A P A P A P   Eval 4/23  40  60  30 at 45 degrees abd  15 at 45 degrees abd    5/21/19  112  120  50 at 90 degrees abd  50 at 90 degrees abd   5/28/19  135  120  60 at 90 degrees abd  60 at 90 degrees abd   6/11/19  143  155  70 at 90 degrees abd  75 at 90 degrees abd   6/20/19 122 153 120 155  70 at 90 degrees abd  78 at 90 degrees abd     Strength:  Date Shoulder flexion Shoulder abduction Shoulder IR Shoulder  ER Bicep   Eval 4/23 NT NT  NT  NT NT   5/28/19 3/5 3/5 4/5 3+/5 5/5   6/20/19 4/5 4/5 5/5 4/5 5/5   NT= not tested per MD protocol      Exercises:R shoulder scope with SAD and RTC repair on 3/8/19   Exercise/Equipment Resistance/Repetitions Other comments          5/31-6/14: initiate full strengthening   UE [place] flexion                    scaption 20x  20x Tactile cues   [place] flex             IR 20x  10x    Stand cane ext                      IR                   Pull across 10x  10x  10x    Tband row              Lat             Add             Triceps             IR ER Orange 20x  Orange 20x  Orange 20x  Orange 20x  Orange 20x  Orange 20x    Bicep curls 4# 30x    Flexion wallslides with palm lift off 20x          Supine cane ER                       Flexion                  Chest press 10x  3# 10x  3# 20x    Supine flexion AROM              Flexion  3# 2x10 R   3# 2x10 R    Supine serratus punch 3# 2x10 R    SL sleeper stretch 3x 20 sec     SL abd        ER 3# 2x10 R  3# 2x10 R    Standing ER JOSEPH 30/60/90 5x 5 sec holds    Rhythmic stab, scap 1 x 10 reps Con/Con  1 x 10 reps Con/Ecc Mod resistance   Prone ext             Row            abd 3# 2x10 R  3# 2x10 R  PT assist to achieve end range. Pt hold at end range 5 sec, 10x         L SL scap PROM  10x each all directions    Shoulder PROM 4-way   10x each  Per Protocol:   Flex/Abd to 180,  ER to tolerance   constant cueing needed for patient to relax with PROM   Glenohumeral mobs A/P, Inf Grade III 10x each          CP after exercises  At home today     Other Therapeutic Activities:  Pt was educated on PT POC, Diagnosis, Prognosis, pathomechanics as well as frequency and duration of scheduling future physical therapy appointments. Time was also taken on this day to answer all patient questions and participation in PT. Reviewed appointment policy in detail with patient and patient verbalized understanding. Discussed with patient at length MD restriction of PROM only. Patient instructed to avoid yardwork activities such as driving lawnmower, cutting down trees. Patient voiced understanding.   5/28/19: Re-eval measurements taken for MD PN. Discussed remaining deficits and continuation of skilled PT at this time. Patient agreeable. Home Exercise Program:  Patient instructed in the following for HEP: pendulums, shrugs, scap squeeze, codmans, supine cane ER. Patient verbalized/demonstrated understanding and was issued written handout. 5/7/19: Added post capsule stretch to HEP.   Patient instructed to get shoulder

## 2019-07-08 ENCOUNTER — HOSPITAL ENCOUNTER (OUTPATIENT)
Dept: PHYSICAL THERAPY | Age: 69
Setting detail: THERAPIES SERIES
Discharge: HOME | End: 2019-07-08
Payer: MEDICARE

## 2019-07-08 PROCEDURE — 97140 MANUAL THERAPY 1/> REGIONS: CPT

## 2019-07-08 PROCEDURE — 97110 THERAPEUTIC EXERCISES: CPT

## 2019-07-08 RX ORDER — SIMVASTATIN 20 MG
TABLET ORAL
Qty: 90 TABLET | Refills: 0 | Status: SHIPPED | OUTPATIENT
Start: 2019-07-08 | End: 2020-02-17

## 2019-07-08 NOTE — FLOWSHEET NOTE
(1/10) when I sleep\"  7/3/19:  Patient reports 0/10 most of the time, reporting discomfort \"only when I sleep\". Patient reports he is able to get to sleep without difficulty, however, after sleeping 2-3 hours he wakes due to achiness. He is able to get back to sleep, but then will wake every 1-1.5 hours, needing to change position. Patient reports he did try using ice last night before bed, did not notice much of a difference. 7/5/19:  Patient reports shoulder is doing okay, is limiting reaching to top shelf. 7/8/19 shoulder is \"good. \"  Pain rated 0/10. Shoulder is getting a little looser, \"I can reach across and get my seat belt now, reach on the top shelf and put the glasses away. \"    Objective:  Observation: Updated 6/20/19  · Palpation: no TTP R AC joint area  · Observation: Patient no longer wearing sling. Good arm swing noted with ambulation. UT substitution noted with forward elevation.   Decreased scapulohumeral rhythm on R.  · Scar: incision portals healed with no signs of infection  Test measurements:    ROM:  Date      Shldr flexion    Shldr abd     Shldr IR               Shldr ER   A P A P A P A P   Eval 4/23  40  60  30 at 45 degrees abd  15 at 45 degrees abd    5/21/19  112  120  50 at 90 degrees abd  50 at 90 degrees abd   5/28/19  135  120  60 at 90 degrees abd  60 at 90 degrees abd   6/11/19  143  155  70 at 90 degrees abd  75 at 90 degrees abd   6/20/19 122 153 120 155  70 at 90 degrees abd  78 at 90 degrees abd   7/8/19 140 168 135 168 60 60 78 78     Strength:  Date Shoulder flexion Shoulder abduction Shoulder IR Shoulder  ER Bicep   Eval 4/23 NT NT  NT  NT NT   5/28/19 3/5 3/5 4/5 3+/5 5/5   6/20/19 4/5 4/5 5/5 4/5 5/5   7/8/19 4+/5 4+/5 5/5 4+/5 5/5   NT= not tested per MD protocol      Exercises:R shoulder scope with SAD and RTC repair on 3/8/19   Exercise/Equipment Resistance/Repetitions Other comments          5/31-6/14: initiate full strengthening   UE [place] flexion

## 2019-07-11 ENCOUNTER — HOSPITAL ENCOUNTER (OUTPATIENT)
Dept: PHYSICAL THERAPY | Age: 69
Setting detail: THERAPIES SERIES
Discharge: HOME | End: 2019-07-11
Payer: MEDICARE

## 2019-07-11 PROCEDURE — 97110 THERAPEUTIC EXERCISES: CPT

## 2019-07-16 ENCOUNTER — HOSPITAL ENCOUNTER (OUTPATIENT)
Dept: PHYSICAL THERAPY | Age: 69
Setting detail: THERAPIES SERIES
Discharge: HOME | End: 2019-07-16
Payer: MEDICARE

## 2019-07-16 PROCEDURE — 97110 THERAPEUTIC EXERCISES: CPT

## 2019-07-16 NOTE — FLOWSHEET NOTE
(1/10) when I sleep\"  7/3/19:  Patient reports 0/10 most of the time, reporting discomfort \"only when I sleep\". Patient reports he is able to get to sleep without difficulty, however, after sleeping 2-3 hours he wakes due to achiness. He is able to get back to sleep, but then will wake every 1-1.5 hours, needing to change position. Patient reports he did try using ice last night before bed, did not notice much of a difference. 7/5/19:  Patient reports shoulder is doing okay, is limiting reaching to top shelf. 7/8/19 shoulder is \"good. \"  Pain rated 0/10. Shoulder is getting a little looser, \"I can reach across and get my seat belt now, reach on the top shelf and put the glasses away. \"  7/16/19     Objective:  Observation: Updated 6/20/19  · Palpation: no TTP R AC joint area  · Observation: Patient no longer wearing sling. Good arm swing noted with ambulation. UT substitution noted with forward elevation.   Decreased scapulohumeral rhythm on R.  · Scar: incision portals healed with no signs of infection  Test measurements:    ROM:  Date      Shldr flexion    Shldr abd     Shldr IR               Shldr ER   A P A P A P A P   Eval 4/23  40  60  30 at 45 degrees abd  15 at 45 degrees abd    5/21/19  112  120  50 at 90 degrees abd  50 at 90 degrees abd   5/28/19  135  120  60 at 90 degrees abd  60 at 90 degrees abd   6/11/19  143  155  70 at 90 degrees abd  75 at 90 degrees abd   6/20/19 122 153 120 155  70 at 90 degrees abd  78 at 90 degrees abd   7/8/19 140 168 135 168 60 60 78 78     Strength:  Date Shoulder flexion Shoulder abduction Shoulder IR Shoulder  ER Bicep   Eval 4/23 NT NT  NT  NT NT   5/28/19 3/5 3/5 4/5 3+/5 5/5   6/20/19 4/5 4/5 5/5 4/5 5/5   7/8/19 4+/5 4+/5 5/5 4+/5 5/5   NT= not tested per MD protocol      Exercises:R shoulder scope with SAD and RTC repair on 3/8/19   Exercise/Equipment Resistance/Repetitions Other comments          5/31-6/14: initiate full strengthening   UE [place] flexion scaption 20x  20x Tactile cues   Pulley flex             IR 20x  10x    Stand cane ext                      IR                   Pull across 10x  10x  10x    Tband row              Lat             Add             Triceps             IR             ER Orange 20x  Orange 20x  Orange 20x  Orange 20x  Orange 20x  Orange 20x    Bicep curls 4# 30x    Flexion wallslides with palm lift off 20x          Supine cane ER                       Flexion                  Chest press 10x  3# 10x  3# 20x    Supine flexion AROM              Flexion  3# 2x10 R   3# 2x10 R    Supine serratus punch 4# 2x10 R    SL sleeper stretch 3x 20 sec     SL abd        ER 3# 2x10 R  3# 2x10 R    Standing ER JOSEPH 30/60/90 5x 5 sec holds    Rhythmic stab, scap 1 x 10 reps Con/Con  1 x 10 reps Con/Ecc Mod resistance   Prone ext             Row            abd 3# 2x10 R  3# 2x10 R  PT assist to achieve end range. Pt hold at end range 5 sec, 10x         L SL scap PROM  10x each all directions    Shoulder PROM 4-way   10x each  Per Protocol:   Flex/Abd to 180,  ER to tolerance   constant cueing needed for patient to relax with PROM   Glenohumeral mobs A/P, Inf Grade III 10x each          CP after exercises  At home today     Other Therapeutic Activities:  Pt was educated on PT POC, Diagnosis, Prognosis, pathomechanics as well as frequency and duration of scheduling future physical therapy appointments. Time was also taken on this day to answer all patient questions and participation in PT. Reviewed appointment policy in detail with patient and patient verbalized understanding. Discussed with patient at length MD restriction of PROM only. Patient instructed to avoid yardwork activities such as driving lawnmower, cutting down trees. Patient voiced understanding.   5/28/19: Re-eval measurements taken for MD PN. Discussed remaining deficits and continuation of skilled PT at this time. Patient agreeable.      Home Exercise Program:  Patient expected to be DC at that time.     Electronically signed by:  Liyah Pate, PT

## 2019-07-18 ENCOUNTER — APPOINTMENT (OUTPATIENT)
Dept: PHYSICAL THERAPY | Age: 69
End: 2019-07-18
Payer: MEDICARE

## 2019-07-23 ENCOUNTER — HOSPITAL ENCOUNTER (OUTPATIENT)
Dept: PHYSICAL THERAPY | Age: 69
Setting detail: THERAPIES SERIES
Discharge: HOME | End: 2019-07-23
Payer: MEDICARE

## 2019-07-23 PROCEDURE — 97110 THERAPEUTIC EXERCISES: CPT

## 2019-07-23 NOTE — FLOWSHEET NOTE
(1/10) when I sleep\"  7/3/19:  Patient reports 0/10 most of the time, reporting discomfort \"only when I sleep\". Patient reports he is able to get to sleep without difficulty, however, after sleeping 2-3 hours he wakes due to achiness. He is able to get back to sleep, but then will wake every 1-1.5 hours, needing to change position. Patient reports he did try using ice last night before bed, did not notice much of a difference. 7/5/19:  Patient reports shoulder is doing okay, is limiting reaching to top shelf. 7/8/19 shoulder is \"good. \"  Pain rated 0/10. Shoulder is getting a little looser, \"I can reach across and get my seat belt now, reach on the top shelf and put the glasses away. \"  7/16/19 7/23/19: pt 15 mins late. Pain is very minimal now, 1/10. Patient reports 90% improvement overall with pain. Has returned to his recreational activities. Objective:  Observation: Updated 7/23/19  · Palpation: no TTP R AC joint area  · Observation: Patient no longer wearing sling. Good arm swing noted with ambulation. Scapulohumeral rhythm on R is improving.   · Scar: incision portals healed with no signs of infection  Test measurements:    ROM:  Date      Shldr flexion    Shldr abd     Shldr IR               Shldr ER   A P A P A P A P   Eval 4/23  40  60  30 at 45 degrees abd  15 at 45 degrees abd    5/21/19  112  120  50 at 90 degrees abd  50 at 90 degrees abd   5/28/19  135  120  60 at 90 degrees abd  60 at 90 degrees abd   6/11/19  143  155  70 at 90 degrees abd  75 at 90 degrees abd   6/20/19 122 153 120 155  70 at 90 degrees abd  78 at 90 degrees abd   7/8/19 140 168 135 168 60 60 78 78   7/23/19 150 168 150 168 60 70 at 90 abd 78 80     Strength:  Date Shoulder flexion Shoulder abduction Shoulder IR Shoulder  ER Bicep   Eval 4/23 NT NT  NT  NT NT   5/28/19 3/5 3/5 4/5 3+/5 5/5   6/20/19 4/5 4/5 5/5 4/5 5/5   7/8/19 4+/5 4+/5 5/5 4+/5 5/5   7/23/19 4+/5 4+/5 5/5 4+/5 5/5   NT= not tested per MD PN.  Discussed remaining deficits and continuation of skilled PT at this time. Patient agreeable. 7/23/19: Re-eval measurements taken for MD CALDERÓN. Discussed remaining deficits and continuation with HEP at this time. Patient agreeable. Home Exercise Program:  Patient instructed in the following for HEP: pendulums, shrugs, scap squeeze, codmans, supine cane ER. Patient verbalized/demonstrated understanding and was issued written handout. 5/7/19: Added post capsule stretch to HEP. Patient instructed to get shoulder pulley for home use. Patient verbalized/demonstrated understanding and was issued written handout. 7/3/19:   Patient instructed in theraband lat pull down, add, triceps, IR, ER; green and purple theraband issued ; written instructions with pictures issued, patient able to demonstrate exercises. Timed Code Treatment Minutes: 35    Total Treatment Minutes: 35    Assessment:  [] Patient tolerated treatment well [] Patient limited by fatigue  [x] Patient limited by pain  [] Patient limited by other medical complications  [] Other:      Prognosis: [x] Good [] Fair  [] Poor    Goals:    Short term goals  Time Frame for Short term goals: 3 weeks  Short term goal 1: Patient will be independent with HEP for prevention of reinjury. 5/28 MET  Short term goal 2: Patient will report 30% improvement with pain for ease with ADLs. 5/28 MET  Short term goal 3: Patient will tolerate 10 mins of AAROM exercises without increased pain for ease with dressing. 5/28 MET     Long term goals  Time Frame for Long term goals : at discharge  Long term goal 1: Increase R shoulder PROM to flex/abd=160, IR=70, and ER=70 for ease with dressing. 7/8 MET  Long term goal 2: Increase UE strength to 4+/5 for ease with lifting.  7/8 MET  Long term goal 3: Patient will report 70% improvement for ease with ADLs. 5/28 MET    Patient Requires Follow-up: [] Yes  [x] No    Plan:   [] Continue per plan of care [] Alter current plan (see

## 2019-12-11 ENCOUNTER — TELEPHONE (OUTPATIENT)
Dept: FAMILY MEDICINE CLINIC | Age: 69
End: 2019-12-11

## 2019-12-17 ENCOUNTER — NURSE ONLY (OUTPATIENT)
Dept: FAMILY MEDICINE CLINIC | Age: 69
End: 2019-12-17
Payer: MEDICARE

## 2019-12-17 DIAGNOSIS — Z00.00 PREVENTATIVE HEALTH CARE: Primary | ICD-10-CM

## 2019-12-17 LAB
ANION GAP SERPL CALCULATED.3IONS-SCNC: 14 MMOL/L (ref 3–16)
BUN BLDV-MCNC: 12 MG/DL (ref 7–20)
CALCIUM SERPL-MCNC: 9.6 MG/DL (ref 8.3–10.6)
CHLORIDE BLD-SCNC: 105 MMOL/L (ref 99–110)
CHOLESTEROL, TOTAL: 170 MG/DL (ref 0–199)
CO2: 26 MMOL/L (ref 21–32)
CREAT SERPL-MCNC: 0.9 MG/DL (ref 0.8–1.3)
GFR AFRICAN AMERICAN: >60
GFR NON-AFRICAN AMERICAN: >60
GLUCOSE BLD-MCNC: 88 MG/DL (ref 70–99)
HCT VFR BLD CALC: 52.2 % (ref 40.5–52.5)
HDLC SERPL-MCNC: 58 MG/DL (ref 40–60)
HEMOGLOBIN: 17.5 G/DL (ref 13.5–17.5)
LDL CHOLESTEROL CALCULATED: 98 MG/DL
MCH RBC QN AUTO: 31.6 PG (ref 26–34)
MCHC RBC AUTO-ENTMCNC: 33.5 G/DL (ref 31–36)
MCV RBC AUTO: 94.3 FL (ref 80–100)
PDW BLD-RTO: 14.3 % (ref 12.4–15.4)
PLATELET # BLD: 109 K/UL (ref 135–450)
PLATELET SLIDE REVIEW: ABNORMAL
PMV BLD AUTO: 12.1 FL (ref 5–10.5)
POTASSIUM SERPL-SCNC: 5.3 MMOL/L (ref 3.5–5.1)
PROSTATE SPECIFIC ANTIGEN: 1.43 NG/ML (ref 0–4)
RBC # BLD: 5.53 M/UL (ref 4.2–5.9)
SODIUM BLD-SCNC: 145 MMOL/L (ref 136–145)
TRIGL SERPL-MCNC: 72 MG/DL (ref 0–150)
TSH REFLEX: 3.35 UIU/ML (ref 0.27–4.2)
VLDLC SERPL CALC-MCNC: 14 MG/DL
WBC # BLD: 5.3 K/UL (ref 4–11)

## 2019-12-17 PROCEDURE — 36415 COLL VENOUS BLD VENIPUNCTURE: CPT | Performed by: FAMILY MEDICINE

## 2020-01-02 ENCOUNTER — OFFICE VISIT (OUTPATIENT)
Dept: FAMILY MEDICINE CLINIC | Age: 70
End: 2020-01-02
Payer: MEDICARE

## 2020-01-02 VITALS
SYSTOLIC BLOOD PRESSURE: 138 MMHG | WEIGHT: 219.4 LBS | DIASTOLIC BLOOD PRESSURE: 88 MMHG | BODY MASS INDEX: 27.28 KG/M2 | HEIGHT: 75 IN

## 2020-01-02 PROCEDURE — 3017F COLORECTAL CA SCREEN DOC REV: CPT | Performed by: FAMILY MEDICINE

## 2020-01-02 PROCEDURE — 4004F PT TOBACCO SCREEN RCVD TLK: CPT | Performed by: FAMILY MEDICINE

## 2020-01-02 PROCEDURE — 99213 OFFICE O/P EST LOW 20 MIN: CPT | Performed by: FAMILY MEDICINE

## 2020-01-02 PROCEDURE — 4040F PNEUMOC VAC/ADMIN/RCVD: CPT | Performed by: FAMILY MEDICINE

## 2020-01-02 PROCEDURE — G8417 CALC BMI ABV UP PARAM F/U: HCPCS | Performed by: FAMILY MEDICINE

## 2020-01-02 PROCEDURE — G8484 FLU IMMUNIZE NO ADMIN: HCPCS | Performed by: FAMILY MEDICINE

## 2020-01-02 PROCEDURE — 1123F ACP DISCUSS/DSCN MKR DOCD: CPT | Performed by: FAMILY MEDICINE

## 2020-01-02 PROCEDURE — G8427 DOCREV CUR MEDS BY ELIG CLIN: HCPCS | Performed by: FAMILY MEDICINE

## 2020-01-02 RX ORDER — AMOXICILLIN 875 MG/1
875 TABLET, COATED ORAL 2 TIMES DAILY
Qty: 20 TABLET | Refills: 0 | Status: SHIPPED | OUTPATIENT
Start: 2020-01-02 | End: 2020-01-12

## 2020-01-02 RX ORDER — LORATADINE 10 MG/1
10 TABLET ORAL DAILY
Qty: 30 TABLET | Refills: 2 | Status: SHIPPED | OUTPATIENT
Start: 2020-01-02 | End: 2020-01-24

## 2020-01-02 ASSESSMENT — ENCOUNTER SYMPTOMS
RHINORRHEA: 1
TROUBLE SWALLOWING: 0
SINUS PRESSURE: 1
VOMITING: 0
EYE DISCHARGE: 0
ABDOMINAL PAIN: 0
SHORTNESS OF BREATH: 0
DIARRHEA: 0
CHEST TIGHTNESS: 0
WHEEZING: 0
SORE THROAT: 1
NAUSEA: 0
COUGH: 0

## 2020-01-24 RX ORDER — LORATADINE 10 MG/1
10 TABLET ORAL DAILY
Qty: 30 TABLET | Refills: 2 | Status: SHIPPED | OUTPATIENT
Start: 2020-01-24 | End: 2020-02-23

## 2020-02-28 ENCOUNTER — TELEPHONE (OUTPATIENT)
Dept: FAMILY MEDICINE CLINIC | Age: 70
End: 2020-02-28

## 2020-07-13 ENCOUNTER — OFFICE VISIT (OUTPATIENT)
Dept: FAMILY MEDICINE CLINIC | Age: 70
End: 2020-07-13
Payer: MEDICARE

## 2020-07-13 ENCOUNTER — OFFICE VISIT (OUTPATIENT)
Dept: ENT CLINIC | Age: 70
End: 2020-07-13
Payer: MEDICARE

## 2020-07-13 VITALS
DIASTOLIC BLOOD PRESSURE: 86 MMHG | HEIGHT: 75 IN | TEMPERATURE: 97.1 F | BODY MASS INDEX: 27.21 KG/M2 | WEIGHT: 218.8 LBS | SYSTOLIC BLOOD PRESSURE: 140 MMHG

## 2020-07-13 VITALS
BODY MASS INDEX: 27.12 KG/M2 | HEART RATE: 72 BPM | WEIGHT: 217 LBS | DIASTOLIC BLOOD PRESSURE: 103 MMHG | SYSTOLIC BLOOD PRESSURE: 158 MMHG | TEMPERATURE: 97.4 F

## 2020-07-13 PROCEDURE — 69200 CLEAR OUTER EAR CANAL: CPT | Performed by: OTOLARYNGOLOGY

## 2020-07-13 PROCEDURE — 4004F PT TOBACCO SCREEN RCVD TLK: CPT | Performed by: FAMILY MEDICINE

## 2020-07-13 PROCEDURE — 99213 OFFICE O/P EST LOW 20 MIN: CPT | Performed by: FAMILY MEDICINE

## 2020-07-13 PROCEDURE — 4040F PNEUMOC VAC/ADMIN/RCVD: CPT | Performed by: FAMILY MEDICINE

## 2020-07-13 PROCEDURE — G8427 DOCREV CUR MEDS BY ELIG CLIN: HCPCS | Performed by: FAMILY MEDICINE

## 2020-07-13 PROCEDURE — 3017F COLORECTAL CA SCREEN DOC REV: CPT | Performed by: FAMILY MEDICINE

## 2020-07-13 PROCEDURE — 1123F ACP DISCUSS/DSCN MKR DOCD: CPT | Performed by: FAMILY MEDICINE

## 2020-07-13 PROCEDURE — G8417 CALC BMI ABV UP PARAM F/U: HCPCS | Performed by: FAMILY MEDICINE

## 2020-07-13 NOTE — PROGRESS NOTES
2020     Bella Hanks (:  1950) is a 68 y.o. male, here for evaluation of the following medical concerns:    HPI    Cerumen build up- right ear, painful, muffled sound, patient has been cleaning ear with Q tips, left ear is wnl. Has had cerumen buildup in the past.  Possible Foreign body in canal.    Today, he denied another problem, denied chest pain, sob, n, v, or diarrhe.a   Review of Systems   Constitutional: Negative for fatigue, fever and unexpected weight change. HENT: Positive for ear pain and hearing loss. Negative for congestion, ear discharge, rhinorrhea, sinus pressure, sinus pain, sore throat and trouble swallowing. Respiratory: Negative for shortness of breath. Cardiovascular: Negative for chest pain. Gastrointestinal: Negative for abdominal pain, diarrhea and nausea. Prior to Visit Medications    Medication Sig Taking? Authorizing Provider   carbamide peroxide (DEBROX) 6.5 % otic solution Place 5 drops in ear(s) 2 times daily Yes Isaiah Brown, DO   simvastatin (ZOCOR) 20 MG tablet TAKE 1 TABLET BY MOUTH EVERY DAY Yes Phani Contreras, DO   Multiple Vitamins-Minerals (CENTRUM SILVER PO) Take 1 tablet by mouth daily Yes Historical Provider, MD        Social History     Tobacco Use    Smoking status: Never Smoker    Smokeless tobacco: Current User   Substance Use Topics    Alcohol use: Yes     Comment: social        Vitals:    20 0807   BP: (!) 140/86   Temp: 97.1 °F (36.2 °C)   Weight: 218 lb 12.8 oz (99.2 kg)   Height: 6' 3\" (1.905 m)     Estimated body mass index is 27.35 kg/m² as calculated from the following:    Height as of this encounter: 6' 3\" (1.905 m). Weight as of this encounter: 218 lb 12.8 oz (99.2 kg). Physical Exam  Vitals signs and nursing note reviewed. Constitutional:       Appearance: Normal appearance. HENT:      Head: Atraumatic. Right Ear: External ear normal. There is impacted cerumen.       Left Ear: Tympanic membrane, ear canal and external ear normal.      Ears:      Comments: Appears to be foreign object in right ear blocking visual inspection of TM  Removed cerumen      Mouth/Throat:      Mouth: Mucous membranes are moist.   Cardiovascular:      Rate and Rhythm: Regular rhythm. Heart sounds: Normal heart sounds. Pulmonary:      Breath sounds: Normal breath sounds. No wheezing. Neurological:      Mental Status: He is alert and oriented to person, place, and time. Psychiatric:         Behavior: Behavior normal.         Judgment: Judgment normal.         ASSESSMENT/PLAN:  1. Impacted cerumen of right ear  Removed cerumen  Educated patient   Couldn't remove foreign object  - Catrachita Hancock MD, Otolaryngology, [facility]    2. Foreign body of right ear, initial encounter  Stable  Make appointments with specialist.   Carter Altamirano MD, Otolaryngology, [facility]      Return for [facility] up with pcp.

## 2020-07-14 ASSESSMENT — ENCOUNTER SYMPTOMS
ABDOMINAL PAIN: 0
RHINORRHEA: 0
DIARRHEA: 0
SINUS PAIN: 0
SHORTNESS OF BREATH: 0
SORE THROAT: 0
SINUS PRESSURE: 0
NAUSEA: 0
TROUBLE SWALLOWING: 0

## 2020-08-10 ENCOUNTER — TELEPHONE (OUTPATIENT)
Dept: FAMILY MEDICINE CLINIC | Age: 70
End: 2020-08-10

## 2020-08-18 ENCOUNTER — NURSE TRIAGE (OUTPATIENT)
Dept: OTHER | Facility: CLINIC | Age: 70
End: 2020-08-18

## 2020-08-18 ENCOUNTER — VIRTUAL VISIT (OUTPATIENT)
Dept: FAMILY MEDICINE CLINIC | Age: 70
End: 2020-08-18
Payer: MEDICARE

## 2020-08-18 PROCEDURE — 4040F PNEUMOC VAC/ADMIN/RCVD: CPT | Performed by: FAMILY MEDICINE

## 2020-08-18 PROCEDURE — 99213 OFFICE O/P EST LOW 20 MIN: CPT | Performed by: FAMILY MEDICINE

## 2020-08-18 PROCEDURE — 3017F COLORECTAL CA SCREEN DOC REV: CPT | Performed by: FAMILY MEDICINE

## 2020-08-18 PROCEDURE — G8428 CUR MEDS NOT DOCUMENT: HCPCS | Performed by: FAMILY MEDICINE

## 2020-08-18 PROCEDURE — 1123F ACP DISCUSS/DSCN MKR DOCD: CPT | Performed by: FAMILY MEDICINE

## 2020-08-18 PROCEDURE — 3288F FALL RISK ASSESSMENT DOCD: CPT | Performed by: FAMILY MEDICINE

## 2020-08-18 PROCEDURE — G8510 SCR DEP NEG, NO PLAN REQD: HCPCS | Performed by: FAMILY MEDICINE

## 2020-08-18 RX ORDER — AZITHROMYCIN 250 MG/1
TABLET, FILM COATED ORAL
Qty: 1 PACKET | Refills: 0 | Status: SHIPPED | OUTPATIENT
Start: 2020-08-18 | End: 2020-09-08 | Stop reason: ALTCHOICE

## 2020-08-18 ASSESSMENT — PATIENT HEALTH QUESTIONNAIRE - PHQ9
SUM OF ALL RESPONSES TO PHQ9 QUESTIONS 1 & 2: 0
1. LITTLE INTEREST OR PLEASURE IN DOING THINGS: 0
SUM OF ALL RESPONSES TO PHQ QUESTIONS 1-9: 0
2. FEELING DOWN, DEPRESSED OR HOPELESS: 0
SUM OF ALL RESPONSES TO PHQ QUESTIONS 1-9: 0

## 2020-08-18 ASSESSMENT — ENCOUNTER SYMPTOMS
COUGH: 1
SHORTNESS OF BREATH: 0

## 2020-08-18 NOTE — PROGRESS NOTES
Subjective:      Patient ID: Susan Gonzalez is a 68 y.o. male. HPI  Virtual visit:  2 day hx of cough congestion yellow mucous   mild chest tightness  Temp 97.5  No sob   no sweats  No headache or loss smell or taste    No Known Allergies      Review of Systems   Constitutional: Negative for fever. HENT: Positive for congestion. Respiratory: Positive for cough. Negative for shortness of breath. Cardiovascular: Negative for chest pain. A complete 14 point  review of systems was completed; pertinent positives are noted in HPI    There were no vitals filed for this visit. There is no height or weight on file to calculate BMI. Wt Readings from Last 3 Encounters:   07/13/20 217 lb (98.4 kg)   07/13/20 218 lb 12.8 oz (99.2 kg)   01/02/20 219 lb 6.4 oz (99.5 kg)     BP Readings from Last 3 Encounters:   07/13/20 (!) 158/103   07/13/20 (!) 140/86   01/02/20 138/88        There were no vitals taken for this visit. Objective:   Physical Exam  Constitutional:       General: He is not in acute distress. Appearance: He is not ill-appearing. Neck:      Musculoskeletal: Neck supple. Pulmonary:      Effort: Pulmonary effort is normal. No respiratory distress. Breath sounds: Normal breath sounds. Neurological:      General: No focal deficit present. Mental Status: He is alert. Psychiatric:         Mood and Affect: Mood normal.         Thought Content: Thought content normal.         Assessment:      Assessment/Plan:  Juan Siddiqui was seen today for cough. Diagnoses and all orders for this visit:    Bronchitis  -     azithromycin (ZITHROMAX) 250 MG tablet; Take 2 tabs (500 mg) on Day 1, and take 1 tab (250 mg) on days 2 through 5.             Plan:      rto if no better      TELEHEALTH EVALUATION -- Audio/Visual (During EQTVI-52 public health emergency)     Pursuant to the emergency declaration under the [facility] [address] waiver authority and the Coronavirus

## 2020-08-18 NOTE — TELEPHONE ENCOUNTER
Reason for Disposition   Patient wants to be seen    Answer Assessment - Initial Assessment Questions  1. ONSET: \"When did the cough begin? \"       Symptoms started a couple of days ago  2. SEVERITY: \"How bad is the cough today? \"   Will cough up a yellow phlegm  3. RESPIRATORY DISTRESS: \"Describe your breathing. \"   No breathing concerns  4. FEVER: \"Do you have a fever? \" If so, ask: \"What is your temperature, how was it measured, and when did it start? \"   no fever  5. HEMOPTYSIS: \"Are you coughing up any blood? \" If so ask: \"How much? \" (flecks, streaks, tablespoons, etc.)    6. TREATMENT: \"What have you done so far to treat the cough? \" (e.g., meds, fluids, humidifier)  Cold/flu medication  7. CARDIAC HISTORY: \"Do you have any history of heart disease? \" (e.g., heart attack, congestive heart failure)   no  8. LUNG HISTORY: \"Do you have any history of lung disease? \"  (e.g., pulmonary embolus, asthma, emphysema)  no  9. PE RISK FACTORS: \"Do you have a history of blood clots? \" (or: recent major surgery, recent prolonged travel, bedridden)    10. OTHER SYMPTOMS: \"Do you have any other symptoms? (e.g., runny nose, wheezing, chest pain)   no nasal congestion  11. PREGNANCY: \"Is there any chance you are pregnant? \" \"When was your last menstrual period? \"    na  12. TRAVEL: \"Have you traveled out of the country in the last month? \" (e.g., travel history, exposures)    Protocols used: COUGH-ADULT-OH  Received call from [facility]. Pt calling with productive cough that started a few days ago. No fever, no breathing concerns. Concerned about possible sinus infection. Recommend pt is seen today, call sooner if worsens. Call soft transferred to [facility] in [address] to schedule appointment. Please do not reply to the triage nurse through this encounter. Any subsequent communication should be directly with the patient.

## 2020-08-24 RX ORDER — SIMVASTATIN 20 MG
TABLET ORAL
Qty: 90 TABLET | Refills: 1 | Status: SHIPPED | OUTPATIENT
Start: 2020-08-24 | End: 2020-09-08 | Stop reason: DRUGHIGH

## 2020-09-03 DIAGNOSIS — I48.91 ATRIAL FIBRILLATION, UNSPECIFIED TYPE (HCC): ICD-10-CM

## 2020-09-03 DIAGNOSIS — E78.2 MIXED HYPERLIPIDEMIA: ICD-10-CM

## 2020-09-03 DIAGNOSIS — Z12.5 ENCOUNTER FOR SCREENING FOR MALIGNANT NEOPLASM OF PROSTATE: ICD-10-CM

## 2020-09-03 DIAGNOSIS — I10 ESSENTIAL HYPERTENSION: ICD-10-CM

## 2020-09-03 DIAGNOSIS — R35.1 NOCTURIA: ICD-10-CM

## 2020-09-03 LAB
A/G RATIO: 2 (ref 1.1–2.2)
ALBUMIN SERPL-MCNC: 4.7 G/DL (ref 3.4–5)
ALP BLD-CCNC: 113 U/L (ref 40–129)
ALT SERPL-CCNC: 27 U/L (ref 10–40)
ANION GAP SERPL CALCULATED.3IONS-SCNC: 9 MMOL/L (ref 3–16)
AST SERPL-CCNC: 29 U/L (ref 15–37)
BILIRUB SERPL-MCNC: 0.6 MG/DL (ref 0–1)
BUN BLDV-MCNC: 14 MG/DL (ref 7–20)
CALCIUM SERPL-MCNC: 9.4 MG/DL (ref 8.3–10.6)
CHLORIDE BLD-SCNC: 104 MMOL/L (ref 99–110)
CHOLESTEROL, TOTAL: 175 MG/DL (ref 0–199)
CO2: 27 MMOL/L (ref 21–32)
CREAT SERPL-MCNC: 0.9 MG/DL (ref 0.8–1.3)
GFR AFRICAN AMERICAN: >60
GFR NON-AFRICAN AMERICAN: >60
GLOBULIN: 2.3 G/DL
GLUCOSE BLD-MCNC: 77 MG/DL (ref 70–99)
HCT VFR BLD CALC: 48.3 % (ref 40.5–52.5)
HDLC SERPL-MCNC: 55 MG/DL (ref 40–60)
HEMOGLOBIN: 16.3 G/DL (ref 13.5–17.5)
LDL CHOLESTEROL CALCULATED: 106 MG/DL
MCH RBC QN AUTO: 31.2 PG (ref 26–34)
MCHC RBC AUTO-ENTMCNC: 33.6 G/DL (ref 31–36)
MCV RBC AUTO: 92.6 FL (ref 80–100)
PDW BLD-RTO: 13.7 % (ref 12.4–15.4)
PLATELET # BLD: 128 K/UL (ref 135–450)
PLATELET SLIDE REVIEW: ABNORMAL
PMV BLD AUTO: 12.6 FL (ref 5–10.5)
POTASSIUM SERPL-SCNC: 5.1 MMOL/L (ref 3.5–5.1)
PROSTATE SPECIFIC ANTIGEN: 2.66 NG/ML (ref 0–4)
RBC # BLD: 5.22 M/UL (ref 4.2–5.9)
SLIDE REVIEW: ABNORMAL
SODIUM BLD-SCNC: 140 MMOL/L (ref 136–145)
TOTAL PROTEIN: 7 G/DL (ref 6.4–8.2)
TRIGL SERPL-MCNC: 69 MG/DL (ref 0–150)
TSH SERPL DL<=0.05 MIU/L-ACNC: 2.54 UIU/ML (ref 0.27–4.2)
VLDLC SERPL CALC-MCNC: 14 MG/DL
WBC # BLD: 5.2 K/UL (ref 4–11)

## 2020-09-08 RX ORDER — SIMVASTATIN 40 MG
40 TABLET ORAL EVERY EVENING
Qty: 90 TABLET | Refills: 1 | Status: SHIPPED | OUTPATIENT
Start: 2020-09-08 | End: 2021-04-09

## 2020-12-03 ENCOUNTER — TELEPHONE (OUTPATIENT)
Dept: FAMILY MEDICINE CLINIC | Age: 70
End: 2020-12-03

## 2020-12-03 NOTE — TELEPHONE ENCOUNTER
Per HIPAA, pt elected to jake that we are only able to speak to him.   Patient notified of Dr Jaci Dee message

## 2020-12-03 NOTE — TELEPHONE ENCOUNTER
----- Message from Pito Elaine sent at 12/3/2020 10:19 AM EST -----  Subject: Message to Provider    QUESTIONS  Information for Provider? Pts wife called and said that the pt was exposed   to covid and is now experiencing some headache and body ache    has taken an Aleve and is there anything else he should take and should   he still be tested on Monday 12/7.  ---------------------------------------------------------------------------  --------------  CALL BACK INFO  What is the best way for the office to contact you? OK to leave message on   voicemail  Preferred Call Back Phone Number? [phone]  ---------------------------------------------------------------------------  --------------  SCRIPT ANSWERS  Relationship to Patient? Other  Representative Name? Rudi Reyes  Is the Representative on the appropriate HIPAA document in Epic?  Yes

## 2020-12-03 NOTE — TELEPHONE ENCOUNTER
Have him tested at a [facility]  tyleonol for pain and fevers  Good fluid intake  Bed rest

## 2020-12-07 ENCOUNTER — OFFICE VISIT (OUTPATIENT)
Dept: PRIMARY CARE CLINIC | Age: 70
End: 2020-12-07
Payer: MEDICARE

## 2020-12-07 PROCEDURE — G8428 CUR MEDS NOT DOCUMENT: HCPCS | Performed by: NURSE PRACTITIONER

## 2020-12-07 PROCEDURE — 99211 OFF/OP EST MAY X REQ PHY/QHP: CPT | Performed by: NURSE PRACTITIONER

## 2020-12-07 PROCEDURE — G8417 CALC BMI ABV UP PARAM F/U: HCPCS | Performed by: NURSE PRACTITIONER

## 2020-12-07 NOTE — PROGRESS NOTES
Dagoberto Guerrier Irena received a viral test for COVID-19. They were educated on isolation and quarantine as appropriate. For any symptoms, they were directed to seek care from their PCP, given contact information to establish with a doctor, directed to an urgent care or the emergency room.

## 2020-12-07 NOTE — PATIENT INSTRUCTIONS

## 2020-12-08 LAB — SARS-COV-2, NAA: DETECTED

## 2020-12-14 ENCOUNTER — TELEMEDICINE (OUTPATIENT)
Dept: FAMILY MEDICINE CLINIC | Age: 70
End: 2020-12-14
Payer: MEDICARE

## 2020-12-14 PROCEDURE — 3017F COLORECTAL CA SCREEN DOC REV: CPT | Performed by: FAMILY MEDICINE

## 2020-12-14 PROCEDURE — G8428 CUR MEDS NOT DOCUMENT: HCPCS | Performed by: FAMILY MEDICINE

## 2020-12-14 PROCEDURE — 4040F PNEUMOC VAC/ADMIN/RCVD: CPT | Performed by: FAMILY MEDICINE

## 2020-12-14 PROCEDURE — 4004F PT TOBACCO SCREEN RCVD TLK: CPT | Performed by: FAMILY MEDICINE

## 2020-12-14 PROCEDURE — 1123F ACP DISCUSS/DSCN MKR DOCD: CPT | Performed by: FAMILY MEDICINE

## 2020-12-14 PROCEDURE — G8417 CALC BMI ABV UP PARAM F/U: HCPCS | Performed by: FAMILY MEDICINE

## 2020-12-14 PROCEDURE — 99214 OFFICE O/P EST MOD 30 MIN: CPT | Performed by: FAMILY MEDICINE

## 2020-12-14 PROCEDURE — G8484 FLU IMMUNIZE NO ADMIN: HCPCS | Performed by: FAMILY MEDICINE

## 2020-12-14 ASSESSMENT — ENCOUNTER SYMPTOMS
ABDOMINAL PAIN: 0
RHINORRHEA: 0
SHORTNESS OF BREATH: 0
NAUSEA: 0
SORE THROAT: 0
VOMITING: 0
TROUBLE SWALLOWING: 0
COUGH: 0
SINUS PRESSURE: 0
DIARRHEA: 0

## 2020-12-14 NOTE — PROGRESS NOTES
2020    TELEHEALTH EVALUATION -- Audio/Visual (During UQWXG-39 public health emergency)    HPI:    Charles Guerin (:  1950) has requested an audio/video evaluation for the following concern(s):    Establish care- feels well today, getting [facility], denied a new problem today. Denied needing refills of medication, denied tobacco use, drug use, or alcohol use. Atrial fib- will see cardiology in , well controlled at this time, not on medication for this? Is stable today. covid- has had it for two weeks, patient feels much improved today, still in quarantine, had mild symptoms, no fever or chills, cough is very mild at this time, has fatigue but overall feels well. Hyperlipidemia- well controlled on medication, no side effects from the medication, takes zocor 40 mg and tolerates this well, patient denied side effects from the medication. Today, denied chest pain, sob, n, v, or diarrhea. Review of Systems   Constitutional: Positive for fatigue. Negative for activity change, fever and unexpected weight change. HENT: Positive for congestion. Negative for ear pain, rhinorrhea, sinus pressure, sore throat and trouble swallowing. Eyes: Negative for visual disturbance. Respiratory: Negative for cough and shortness of breath. Cardiovascular: Negative for chest pain, palpitations and leg swelling. Gastrointestinal: Negative for abdominal pain, diarrhea, nausea and vomiting. Musculoskeletal: Negative for arthralgias. Neurological: Negative for dizziness, syncope, light-headedness and headaches. Psychiatric/Behavioral: Negative for suicidal ideas. The patient is not nervous/anxious. Prior to Visit Medications    Medication Sig Taking?  Authorizing Provider   simvastatin (ZOCOR) 40 MG tablet Take 1 tablet by mouth every evening  Francisca Grain, DO   Multiple Vitamins-Minerals (CENTRUM SILVER PO) Take 1 tablet by mouth daily  Historical Provider, MD Social History     Tobacco Use    Smoking status: Never Smoker    Smokeless tobacco: Current User   Substance Use Topics    Alcohol use: Yes     Comment: social    Drug use: Never        No Known Allergies    PHYSICAL EXAMINATION:  [ INSTRUCTIONS:  \"[x]\" Indicates a positive item  \"[]\" Indicates a negative item  -- DELETE ALL ITEMS NOT EXAMINED]  Vital Signs: (As obtained by patient/caregiver or practitioner observation)  See chart  Blood pressure-  Heart rate-    Respiratory rate-    Temperature-  Pulse oximetry-     Constitutional: [x] Appears well-developed and well-nourished [] No apparent distress      [] Abnormal-   Mental status  [x] Alert and awake  [] Oriented to person/place/time []Able to follow commands      Eyes:  EOM    [x]  Normal  [] Abnormal-  Sclera  []  Normal  [] Abnormal -         Discharge []  None visible  [] Abnormal -    HENT:   [x] Normocephalic, atraumatic. [] Abnormal   [] Mouth/Throat: Mucous membranes are moist.     External Ears [x] Normal  [] Abnormal-     Neck: [x] No visualized mass     Pulmonary/Chest: [x] Respiratory effort normal.  [] No visualized signs of difficulty breathing or respiratory distress        [] Abnormal-      Musculoskeletal:   [] Normal gait with no signs of ataxia         [x] Normal range of motion of neck        [] Abnormal-       Neurological:        [x] No Facial Asymmetry (Cranial nerve 7 motor function) (limited exam to video visit)          [] No gaze palsy        [] Abnormal-         Skin:        [x] No significant exanthematous lesions or discoloration noted on facial skin         [] Abnormal-            Psychiatric:       [x] Normal Affect [] No Hallucinations        [] Abnormal-     Other pertinent observable physical exam findings-     ASSESSMENT/PLAN:  1. Encounter to establish care  Care established  Medications reviewed  Questions answered  Labs obtained  RTC in three months for follow up.      2. Paroxysmal atrial fibrillation (HCC)  Stable Continue with medication  Keep appointments with specialist.   Sayra Chi questions    3. COVID-19  Stable today  Continue with quarantine  Push fluids  Rest   Tylenol for pain/fever    4. Mixed hyperlipidemia  Take medication as prescribed. Discussed the need to exercise 30 minutes each day. Monitor diet, avoid fried foods etc... Educated on need to reduce cholesterol in diet and results of poor diet. Return in about 3 months (around 3/14/2021). Akiko Shankar is a 65 y.o. male being evaluated by a Virtual Visit (video visit) encounter to address concerns as mentioned above. A caregiver was present when appropriate. Due to this being a TeleHealth encounter (During HBAKS-14 public health emergency), evaluation of the following organ systems was limited: Vitals/Constitutional/EENT/Resp/CV/GI//MS/Neuro/Skin/Heme-Lymph-Imm. Pursuant to the emergency declaration under the [address] [place] [address], [address] [place] [address] and the [employer] and [employer] Act, this Virtual Visit was conducted with patient's (and/or legal guardian's) consent, to reduce the patient's risk of exposure to COVID-19 and provide necessary medical care. The patient (and/or legal guardian) has also been advised to contact this office for worsening conditions or problems, and seek emergency medical treatment and/or call 911 if deemed necessary. Patient identification was verified at the start of the visit: Yes    Total time spent on this encounter: Not billed by time    Services were provided through a video synchronous discussion virtually to substitute for in-person clinic visit. Patient and provider were located at their individual homes. --Mely Gabriel DO on 12/14/2020 at 8:30 PM    An electronic signature was used to authenticate this note.

## 2021-04-09 DIAGNOSIS — E78.2 MIXED HYPERLIPIDEMIA: ICD-10-CM

## 2021-04-09 RX ORDER — SIMVASTATIN 40 MG
TABLET ORAL
Qty: 90 TABLET | Refills: 1 | Status: SHIPPED | OUTPATIENT
Start: 2021-04-09 | End: 2021-09-30

## 2021-04-28 ENCOUNTER — TELEPHONE (OUTPATIENT)
Dept: FAMILY MEDICINE CLINIC | Age: 71
End: 2021-04-28

## 2021-04-28 NOTE — TELEPHONE ENCOUNTER
I would be glad to help. Please find out what the patient is needing lab work done for and if he is planning on a follow up visit.

## 2021-04-28 NOTE — TELEPHONE ENCOUNTER
----- Message from Kianna Hanley sent at 4/28/2021 10:53 AM EDT -----  Subject: Referral Request    QUESTIONS   Reason for referral request? PT requesting lab work   Has the physician seen you for this condition before? No   Preferred Specialist (if applicable)? Do you already have an appointment scheduled? No  Additional Information for Provider?   ---------------------------------------------------------------------------  --------------  CALL BACK INFO  What is the best way for the office to contact you? OK to leave message on   voicemail  Preferred Call Back Phone Number?  [phone]

## 2021-04-29 NOTE — TELEPHONE ENCOUNTER
I don't mind doing pre blood work at all but we need to make the patient aware that insurance may not cover her physical regardless if she has had this in the past.  Let me know.

## 2021-04-30 NOTE — TELEPHONE ENCOUNTER
Pt advised and understands Medicare does not cover physicals. They would like to keep their appointments, but change them to a targeted visit to check BP and get medication refills.       Based on med list, what labs would you like to order?

## 2021-05-03 DIAGNOSIS — E78.2 MIXED HYPERLIPIDEMIA: Primary | ICD-10-CM

## 2021-05-03 DIAGNOSIS — I10 ESSENTIAL HYPERTENSION: ICD-10-CM

## 2021-05-25 ENCOUNTER — NURSE ONLY (OUTPATIENT)
Dept: FAMILY MEDICINE CLINIC | Age: 71
End: 2021-05-25
Payer: MEDICARE

## 2021-05-25 DIAGNOSIS — I10 ESSENTIAL HYPERTENSION: ICD-10-CM

## 2021-05-25 DIAGNOSIS — E78.2 MIXED HYPERLIPIDEMIA: ICD-10-CM

## 2021-05-25 LAB
A/G RATIO: 1.9 (ref 1.1–2.2)
ALBUMIN SERPL-MCNC: 4.5 G/DL (ref 3.4–5)
ALP BLD-CCNC: 114 U/L (ref 40–129)
ALT SERPL-CCNC: 25 U/L (ref 10–40)
ANION GAP SERPL CALCULATED.3IONS-SCNC: 12 MMOL/L (ref 3–16)
AST SERPL-CCNC: 34 U/L (ref 15–37)
BILIRUB SERPL-MCNC: 0.5 MG/DL (ref 0–1)
BUN BLDV-MCNC: 11 MG/DL (ref 7–20)
CALCIUM SERPL-MCNC: 9 MG/DL (ref 8.3–10.6)
CHLORIDE BLD-SCNC: 105 MMOL/L (ref 99–110)
CHOLESTEROL, TOTAL: 144 MG/DL (ref 0–199)
CO2: 23 MMOL/L (ref 21–32)
CREAT SERPL-MCNC: 0.8 MG/DL (ref 0.8–1.3)
GFR AFRICAN AMERICAN: >60
GFR NON-AFRICAN AMERICAN: >60
GLOBULIN: 2.4 G/DL
GLUCOSE BLD-MCNC: 76 MG/DL (ref 70–99)
HCT VFR BLD CALC: 45.5 % (ref 40.5–52.5)
HDLC SERPL-MCNC: 48 MG/DL (ref 40–60)
HEMOGLOBIN: 15.6 G/DL (ref 13.5–17.5)
LDL CHOLESTEROL CALCULATED: 75 MG/DL
MCH RBC QN AUTO: 31.5 PG (ref 26–34)
MCHC RBC AUTO-ENTMCNC: 34.2 G/DL (ref 31–36)
MCV RBC AUTO: 92.2 FL (ref 80–100)
PDW BLD-RTO: 13.9 % (ref 12.4–15.4)
PLATELET # BLD: 101 K/UL (ref 135–450)
PMV BLD AUTO: 11.5 FL (ref 5–10.5)
POTASSIUM SERPL-SCNC: 4.9 MMOL/L (ref 3.5–5.1)
RBC # BLD: 4.93 M/UL (ref 4.2–5.9)
SODIUM BLD-SCNC: 140 MMOL/L (ref 136–145)
TOTAL PROTEIN: 6.9 G/DL (ref 6.4–8.2)
TRIGL SERPL-MCNC: 105 MG/DL (ref 0–150)
TSH REFLEX FT4: 2.11 UIU/ML (ref 0.27–4.2)
VLDLC SERPL CALC-MCNC: 21 MG/DL
WBC # BLD: 5.1 K/UL (ref 4–11)

## 2021-05-25 PROCEDURE — 36415 COLL VENOUS BLD VENIPUNCTURE: CPT | Performed by: FAMILY MEDICINE

## 2021-05-25 NOTE — PROGRESS NOTES
Pt came into office for fasting blood work    Mihai Santos two sst and one lavender tube from right arm without complications

## 2021-06-03 ENCOUNTER — HOSPITAL ENCOUNTER (OUTPATIENT)
Dept: GENERAL RADIOLOGY | Age: 71
Discharge: HOME | End: 2021-06-03
Payer: MEDICARE

## 2021-06-03 ENCOUNTER — HOSPITAL ENCOUNTER (OUTPATIENT)
Age: 71
Discharge: HOME | End: 2021-06-03
Payer: MEDICARE

## 2021-06-03 ENCOUNTER — OFFICE VISIT (OUTPATIENT)
Dept: FAMILY MEDICINE CLINIC | Age: 71
End: 2021-06-03
Payer: MEDICARE

## 2021-06-03 VITALS
HEART RATE: 75 BPM | WEIGHT: 220 LBS | BODY MASS INDEX: 27.5 KG/M2 | OXYGEN SATURATION: 97 % | DIASTOLIC BLOOD PRESSURE: 78 MMHG | SYSTOLIC BLOOD PRESSURE: 122 MMHG

## 2021-06-03 DIAGNOSIS — R53.83 OTHER FATIGUE: ICD-10-CM

## 2021-06-03 DIAGNOSIS — I48.0 PAROXYSMAL ATRIAL FIBRILLATION (HCC): ICD-10-CM

## 2021-06-03 DIAGNOSIS — E78.2 MIXED HYPERLIPIDEMIA: ICD-10-CM

## 2021-06-03 DIAGNOSIS — I10 ESSENTIAL HYPERTENSION: ICD-10-CM

## 2021-06-03 DIAGNOSIS — Z00.00 ANNUAL VISIT FOR GENERAL ADULT MEDICAL EXAMINATION WITHOUT ABNORMAL FINDINGS: ICD-10-CM

## 2021-06-03 DIAGNOSIS — D69.6 THROMBOCYTOPENIA (HCC): Primary | ICD-10-CM

## 2021-06-03 PROBLEM — D68.9 COAGULATION DEFECT (HCC): Status: ACTIVE | Noted: 2021-06-03

## 2021-06-03 PROCEDURE — G0439 PPPS, SUBSEQ VISIT: HCPCS | Performed by: FAMILY MEDICINE

## 2021-06-03 PROCEDURE — 71046 X-RAY EXAM CHEST 2 VIEWS: CPT

## 2021-06-03 RX ORDER — LORATADINE 10 MG/1
10 TABLET ORAL DAILY
Qty: 30 TABLET | Refills: 2 | Status: SHIPPED | OUTPATIENT
Start: 2021-06-03 | End: 2021-06-28

## 2021-06-03 ASSESSMENT — PATIENT HEALTH QUESTIONNAIRE - PHQ9
SUM OF ALL RESPONSES TO PHQ QUESTIONS 1-9: 0
SUM OF ALL RESPONSES TO PHQ9 QUESTIONS 1 & 2: 0
SUM OF ALL RESPONSES TO PHQ QUESTIONS 1-9: 0
2. FEELING DOWN, DEPRESSED OR HOPELESS: 0
1. LITTLE INTEREST OR PLEASURE IN DOING THINGS: 0
SUM OF ALL RESPONSES TO PHQ QUESTIONS 1-9: 0

## 2021-06-03 NOTE — PROGRESS NOTES
polyphagia. Genitourinary: Negative for decreased urine volume, discharge, dysuria, frequency, genital sores, penile pain, testicular pain and urgency. Musculoskeletal: Positive for arthralgias. Neurological: Negative for dizziness, syncope, light-headedness and headaches. Psychiatric/Behavioral: Negative for dysphoric mood. The patient is not nervous/anxious. Prior to Visit Medications    Medication Sig Taking? Authorizing Provider   loratadine (CLARITIN) 10 MG tablet Take 1 tablet by mouth daily for 30 doses Yes Isaiah Brown, DO   simvastatin (ZOCOR) 40 MG tablet TAKE 1 TABLET BY MOUTH EVERY DAY IN THE EVENING Yes Isaiah Brown, DO   Multiple Vitamins-Minerals (CENTRUM SILVER PO) Take 1 tablet by mouth daily Yes Historical Provider, MD        Social History     Tobacco Use    Smoking status: Never Smoker    Smokeless tobacco: Current User   Substance Use Topics    Alcohol use: Yes     Comment: social        Vitals:    06/03/21 1336   BP: 122/78   Site: Left Upper Arm   Position: Sitting   Cuff Size: Large Adult   Pulse: 75   SpO2: 97%   Weight: 220 lb (99.8 kg)     Estimated body mass index is 27.5 kg/m² as calculated from the following:    Height as of 7/13/20: 6' 3\" (1.905 m). Weight as of this encounter: 220 lb (99.8 kg). Physical Exam  Vitals and nursing note reviewed. Constitutional:       Appearance: Normal appearance. He is well-developed. HENT:      Head: Normocephalic and atraumatic. Right Ear: Tympanic membrane, ear canal and external ear normal.      Left Ear: Tympanic membrane, ear canal and external ear normal.      Nose: Nose normal.      Mouth/Throat:      Mouth: Mucous membranes are moist.   Eyes:      Pupils: Pupils are equal, round, and reactive to light. Neck:      Thyroid: No thyromegaly. Cardiovascular:      Rate and Rhythm: Normal rate and regular rhythm. Heart sounds: No murmur heard.      Pulmonary:      Effort: Pulmonary effort is normal. Breath sounds: Normal breath sounds. No wheezing or rales. Abdominal:      General: Bowel sounds are normal.      Palpations: Abdomen is soft. Tenderness: There is no abdominal tenderness. Musculoskeletal:         General: Normal range of motion. Skin:     Findings: No rash. Neurological:      General: No focal deficit present. Mental Status: He is alert and oriented to person, place, and time. Psychiatric:         Behavior: Behavior normal.         Judgment: Judgment normal.         ASSESSMENT/PLAN:  1. Annual visit for general adult medical examination without abnormal findings  discussed vaccinations and he believes these are up to date  -discuseds healthy eating habits and exercise and answered questions  -discussed age appropriate screening recommendations  - detailed conversation concerning DEVAUGHN and PSA testing, he declined. 2. Essential hypertension  well controlled. Discussed signs and symptoms for immediate evaluation in the ER. Reduce sodium. Monitor diet, exercise and lose weight. Keep a BP log and bring it to your next appointment. Needs to rtc in one month for BP check    3. Paroxysmal atrial fibrillation (HCC)  RRR today on PE, stated this has been well controlled  Continue with medication  Make appointment with specialist to follow up very important. Answered questions    4. Mixed hyperlipidemia  Take medication as prescribed. Discussed the need to exercise 30 minutes each day. Monitor diet, avoid fried foods etc... Educated on need to reduce cholesterol in diet and results of poor diet. 5. Other fatigue  More than likely secondary to sleep apnea  Need to encourage seeing a sleep  again  Will discuss in more detail once labs are back. - XR CHEST STANDARD (2 VW); Future    6. Thrombocytopenia ([address]  Labs obtained  Educated on the importance of having this done. Answered questions      Return in about 3 months (around 9/3/2021).

## 2021-06-12 ASSESSMENT — ENCOUNTER SYMPTOMS
CHEST TIGHTNESS: 0
DIARRHEA: 0
TROUBLE SWALLOWING: 0
SHORTNESS OF BREATH: 0
WHEEZING: 0
EYE DISCHARGE: 0
NAUSEA: 0
SINUS PRESSURE: 0
COUGH: 1
ABDOMINAL PAIN: 0
RHINORRHEA: 1
VOMITING: 0
SORE THROAT: 0

## 2021-06-28 RX ORDER — LORATADINE 10 MG/1
TABLET ORAL
Qty: 30 TABLET | Refills: 2 | Status: SHIPPED | OUTPATIENT
Start: 2021-06-28 | End: 2021-09-29

## 2021-09-20 ENCOUNTER — OFFICE VISIT (OUTPATIENT)
Dept: ORTHOPEDIC SURGERY | Age: 71
End: 2021-09-20
Payer: MEDICARE

## 2021-09-20 VITALS — RESPIRATION RATE: 16 BRPM | HEIGHT: 76 IN | BODY MASS INDEX: 26.79 KG/M2 | WEIGHT: 220 LBS

## 2021-09-20 DIAGNOSIS — S76.311A PARTIAL HAMSTRING TEAR, RIGHT, INITIAL ENCOUNTER: ICD-10-CM

## 2021-09-20 DIAGNOSIS — M79.604 PAIN OF RIGHT LOWER EXTREMITY: Primary | ICD-10-CM

## 2021-09-20 PROBLEM — S76.319A PARTIAL HAMSTRING TEAR: Status: ACTIVE | Noted: 2021-09-20

## 2021-09-20 PROCEDURE — 1123F ACP DISCUSS/DSCN MKR DOCD: CPT | Performed by: PHYSICIAN ASSISTANT

## 2021-09-20 PROCEDURE — G8417 CALC BMI ABV UP PARAM F/U: HCPCS | Performed by: PHYSICIAN ASSISTANT

## 2021-09-20 PROCEDURE — 3017F COLORECTAL CA SCREEN DOC REV: CPT | Performed by: PHYSICIAN ASSISTANT

## 2021-09-20 PROCEDURE — 99213 OFFICE O/P EST LOW 20 MIN: CPT | Performed by: PHYSICIAN ASSISTANT

## 2021-09-20 PROCEDURE — 4040F PNEUMOC VAC/ADMIN/RCVD: CPT | Performed by: PHYSICIAN ASSISTANT

## 2021-09-20 PROCEDURE — G8427 DOCREV CUR MEDS BY ELIG CLIN: HCPCS | Performed by: PHYSICIAN ASSISTANT

## 2021-09-20 PROCEDURE — 4004F PT TOBACCO SCREEN RCVD TLK: CPT | Performed by: PHYSICIAN ASSISTANT

## 2021-09-20 NOTE — PROGRESS NOTES
Occupational History    Not on file   Tobacco Use    Smoking status: Never Smoker    Smokeless tobacco: Current User   Vaping Use    Vaping Use: Never assessed   Substance and Sexual Activity    Alcohol use: Yes     Comment: social    Drug use: Never    Sexual activity: Yes     Partners: Female       Current Outpatient Medications   Medication Sig Dispense Refill    loratadine (CLARITIN) 10 MG tablet TAKE 1 TABLET BY MOUTH EVERY DAY 30 tablet 2    simvastatin (ZOCOR) 40 MG tablet TAKE 1 TABLET BY MOUTH EVERY DAY IN THE EVENING 90 tablet 1    Multiple Vitamins-Minerals (CENTRUM SILVER PO) Take 1 tablet by mouth daily       No current facility-administered medications for this visit. No Known Allergies      Objective:     He is alert, oriented x 3, pleasant, well nourished, developed and in no acute distress. Resp 16   Ht 6' 4\" (1.93 m)   Wt 220 lb (99.8 kg)   BMI 26.78 kg/m²        Examination of the right hip shows:  Mild discoloration of the lower body of the hamstring. There are no wounds or gross deformity. Palpation of the ischial tuberosity-nontender palpation. No palpable defect within the tendon. Pain with resisted knee flexion. Lower extremities:  He has 5/5 motor strength of bilateral lower extremities. He has a negative straight leg raise, bilaterally. Deep tendon reflexes at knees and achilles are 2+. Sensation is intact to light touch L3 to S1 bilaterally. He has no clonus. Examination of the lower extremities shows intact perfusion to both lower extremities. He has no cyanosis and digigts are warm to touch, capillary refill is less than 2 seconds. He has no edema noted. He has intact skin without lacerations or abrasions, no significant erythema, rashes or skin lesions. X Rays: were performed in the office today:   AP pelvis:  No acute fracture of the ischium. Mild degenerative hip arthritis.     Additional Tests reviewed: none  Additional Outside Records reviewed: none    Diagnosis:       ICD-10-CM    1. Pain of right lower extremity  M79.604 XR PELVIS (1-2 VIEWS)   2. Partial hamstring tear, right, initial encounter  S76.311A Ambulatory referral to Physical Therapy        Assessment and Plan:       Assessment:  Mid body partial tear of the right hamstring. Hamstring tendon intact. I had an extensive discussion with Mr. Mayi Plascencia regarding the natural history, etiology, and long term consequences of his condition. I have presented reasonable alternatives to the patient's proposed care, treatment, and services. Risks and benefits of the treatment options also reviewed in detail. I have outlined a treatment plan with them. He has had full opportunity to ask his questions. I have answered them all to his satisfaction. I feel that Mr. Mayi Plascencia understands our discussion today. Plan:  Medications- OTC NSAIDS discussed. He  was advised that NSAID-type medications have several important potential side effects: gastrointestinal irritation including hemorrhage, cardiac events and renal injuries. He was asked to take the medication with food and to stop if he experiences any GI upset. I asked him to call for vomiting, abdominal pain or black/bloody stools. He should have renal function testing per his medical provider periodically. He expresses understanding of these risks associated with NSAID use and questions were answered. Compression shorts recommended. PT-PT Rx provided today. Activity restrictions discussed. Follow up- 4 weeks. Call or return to clinic if these symptoms worsen or fail to improve as anticipated. Fabiola Ramirez PA-C   Senior Physician Assistant   [facility]                                         Disclaimer:   This note was generated with use of a verbal recognition program (DRAGON) and an attempt was made to check for errors. It is possible that there are still dictated errors within this office note. If so, please bring any significant errors to my attention for an addendum. All efforts were made to ensure that this office note is accurate.

## 2021-09-29 ENCOUNTER — HOSPITAL ENCOUNTER (OUTPATIENT)
Dept: PHYSICAL THERAPY | Age: 71
Setting detail: THERAPIES SERIES
Discharge: HOME | End: 2021-09-29
Payer: MEDICARE

## 2021-09-29 PROCEDURE — 97140 MANUAL THERAPY 1/> REGIONS: CPT

## 2021-09-29 PROCEDURE — 97161 PT EVAL LOW COMPLEX 20 MIN: CPT

## 2021-09-29 PROCEDURE — 97110 THERAPEUTIC EXERCISES: CPT

## 2021-09-29 RX ORDER — LORATADINE 10 MG/1
TABLET ORAL
Qty: 90 TABLET | Refills: 1 | Status: SHIPPED | OUTPATIENT
Start: 2021-09-29

## 2021-09-29 NOTE — FLOWSHEET NOTE
[facility] - Outpatient Rehabilitation and Therapy, [address] [place] [address]  Phone: [phone]   Fax:     [phone]      Physical Therapy Treatment Note/ Progress Report:     Date:  2021    Patient Name:  Charles Guerin    :  1950  MRN: [id number]    Pertinent Medical History:     Medical/Treatment Diagnosis Information:  · Diagnosis: Partial hamstring tear, right, initial encounter (S76.311A)  · Treatment Diagnosis: Decreased functional mobility    Insurance/Certification information:  PT Insurance Information: Medicare, [employer] secondary  Physician Information:  Referring Practitioner: SULLY Tejada  Plan of care signed (Y/N): N    Date of Patient follow up with Physician:      Progress Report: []  Yes  [x]  No     Date Range for reporting period:  Beginnin21  Ending:      Progress report due (10 Rx/or 30 days whichever is less):      Recertification due (POC duration/ or 90 days whichever is less):      Visit # POC/Insurance Allowable Auth Needed   1 12 []Yes   []No     Latex Allergy:  [x]NO      []YES  Preferred Language for Healthcare:   [x]English       []other:  Functional Scale: WOMAC = 21 23/96  Pain level:  0-3/10     History of Injury:  Patient stated he was lifting 120 box from [address] a couple weeks ago. Pt was diagnosed with partially torn hamstring, R. Pt was having difficulty walking and walking up/down steps and driveway. Pt stated he feels like he is making improvements as he has less pain walking up steps.     SUBJECTIVE:  See eval    OBJECTIVE:    Observation:    21  Posture: R knee flexed, minimally decreased R weight shift  Observation: large bruise medial posterior distal R thigh  Squat: 3/4 range, pt attributed difficulty to knee problems  Heel walk (+) R, toe walk (-) B  SLS R 1 sec, L 3 sec  Palpation: minimal tenderness R semimembranosis   Gait: (include devices/WB status) decreased R weight shift, decreased R knee terminal knee extension     Test measurements:      ROM 9/29/21     LE L and R WFL     Trunk flexion Decreased 50%     Trunk extension WFL     Flexibility hamstrings 90/90 test L -15  R -25      Strength - WFL LEFT RIGHT       RESTRICTIONS/PRECAUTIONS:     Exercises/Interventions:     Therapeutic Ex (25057)   Min: 25 Reps/Resistance Notes/CUES   Nu Step add    Stretch  Hamstrings  Gastroc incline   2x30\"  2x30\"                             Manual Intervention (72012 [facility])  Min: 15     STM R semimembranosus 15'                             NMR re-education (11253)  Min:  CUES NEEDED   JOSEPH     SLS     Standing t-slide  ABD  EXT     Leg press     Minisquat          Therapeutic Activity (83532)  Min:                                   Modalities  Min:     IFC with      CP after exercises     MH after exercises            Other Therapeutic Activities: Pt was educated on PT POC, Diagnosis, Prognosis, pathomechanics as well as frequency and duration of scheduling future physical therapy appointments. Time was also taken on this day to answer all patient questions and participation in PT. Reviewed appointment policy in detail with patient and patient verbalized understanding. Home Exercise Program: Patient was instructed in the following for HEP:URL: [email or web address]  Date: 09/29/2021  Prepared by: Anastasiya Arms     Exercises  Supine Bridge - 1 x daily - 7 x weekly - 10 reps - 5 hold  Supine Hip Adduction Isometric with Ball - 1 x daily - 7 x weekly - 10 reps - 10 hold  Supine Straight Leg Raises - 1 x daily - 7 x weekly - 2 sets - 10 reps  Straight Leg Raise with External Rotation - 1 x daily - 7 x weekly - 2 sets - 10 reps  Supine Isometric Hamstring Set - 1 x daily - 7 x weekly - 10 reps - 5-10 hold  Prone Hip Extension - Two Pillows - 1 x daily - 7 x weekly - 10 reps  Prone Knee Flexion AROM - 1 x daily - 7 x weekly - 20 reps  Standing Hamstring Stretch on Chair - 1 x daily - 7 x weekly - 2 reps - 30 hold  Standing Gastroc Stretch - 1 x daily - 7 x weekly - 2 reps - 30 hold     . Patient verbalized/demonstrated understanding and was issued written handout. Therapeutic Exercise and NMR EXR  [] (78650) Provided verbal/tactile cueing for activities related to strengthening, flexibility, endurance, ROM for improvements in LE, proximal hip, and core control with self care, mobility, lifting, ambulation.  [] (58818) Provided verbal/tactile cueing for activities related to improving balance, coordination, kinesthetic sense, posture, motor skill, proprioception  to assist with LE, proximal hip, and core control in self care, mobility, lifting, ambulation and eccentric single leg control.      NMR and Therapeutic Activities:    [] (52645 or 58918) Provided verbal/tactile cueing for activities related to improving balance, coordination, kinesthetic sense, posture, motor skill, proprioception and motor activation to allow for proper function of core, proximal hip and LE with self care and ADLs and functional mobility.   [] (82568) Gait Re-education- Provided training and instruction to the patient for proper LE, core and proximal hip recruitment and positioning and eccentric body weight control with ambulation re-education including up and down stairs     Home Exercise Program:    [x] (56403) Reviewed/Progressed HEP activities related to strengthening, flexibility, endurance, ROM of core, proximal hip and LE for functional self-care, mobility, lifting and ambulation/stair navigation   [] (12586)Reviewed/Progressed HEP activities related to improving balance, coordination, kinesthetic sense, posture, motor skill, proprioception of core, proximal hip and LE for self care, mobility, lifting, and ambulation/stair navigation      Manual Treatments:  PROM / STM / Oscillations-Mobs:  G-I, II, III, IV (PA's, Inf., Post.)  [] (35410) Provided manual therapy to mobilize LE, proximal hip and/or LS spine soft tissue/joints for the purpose of modulating pain, promoting relaxation,  increasing ROM, reducing/eliminating soft tissue swelling/inflammation/restriction, improving soft tissue extensibility and allowing for proper ROM for normal function with self care, mobility, lifting and ambulation. Charges:  Timed Code Treatment Minutes: 40   Total Treatment Minutes: 60      [x] EVAL (LOW) 71276 (typically 20 minutes face-to-face)  [] EVAL (MOD) 52089 (typically 30 minutes face-to-face)  [] EVAL (HIGH) 20222 (typically 45 minutes face-to-face)  [] RE-EVAL     [x] IL(25805) x  2   [] Dry needle 1 or 2 Muscles (10533)  [] NMR (39124) x     [] Dry needle 3+ Muscles (31159)  [x] Manual (58686) x     [] Ultrasound (57627) x  [] TA (06048) x     [] Mech Traction (77423)  [] ES(attended) (42741)     [] ES (un) (83497):   [] Vasopump (57499) [] Ionto (08201)   [] Other:    GOALS:  Patient stated goal: \"use of right leg, climbing, etc.\"  []? Progressing: []? Met: []? Not Met: []? Adjusted     Therapist goals for Patient:   Short Term Goals: To be achieved in: 2 weeks  1. Independent in HEP and progression per patient tolerance, in order to prevent re-injury. []? Progressing: []? Met: []? Not Met: []? Adjusted  2. Patient will have a decrease in pain to facilitate improvement in movement, function, and ADLs as indicated by Functional Deficits. []? Progressing: []? Met: []? Not Met: []? Adjusted     Long Term Goals: To be achieved in: 6 weeks  1. Disability index score of 10% or less for the [facility] to assist with reaching prior level of function. []? Progressing: []? Met: []? Not Met: []? Adjusted  2. Patient will demonstrate increased hamstrings flexibility to allow for proper joint functioning as indicated by patients Functional Deficits. []? Progressing: []? Met: []? Not Met: []? Adjusted  3.  Patient will demonstrate an increase in Strength to good proximal hip strength and control, within 5lb HHD in LE to allow for proper functional mobility as indicated by patients Functional Deficits. []? Progressing: []? Met: []? Not Met: []? Adjusted  4. Patient will return to hunting deer without increased symptoms or restriction. []? Progressing: []? Met: []? Not Met: []? Adjusted  5. Patient will be able to climbing stairs and hills without pain. []? Progressing: []? Met: []? Not Met: []? Adjusted         ASSESSMENT:  See eval    Treatment/Activity Tolerance:  [x] Patient tolerated treatment well [] Patient limited by fatique  [] Patient limited by pain  [] Patient limited by other medical complications  [] Other:     Overall Progression Towards Functional goals/ Treatment Progress Update:  [] Patient is progressing as expected towards functional goals listed. [] Progression is slowed due to complexities/Impairments listed. [] Progression has been slowed due to co-morbidities. [x] Plan just implemented, too soon to assess goals progression <30days   [] Goals require adjustment due to lack of progress  [] Patient is not progressing as expected and requires additional follow up with physician  [] Other    Prognosis for POC: [x] Good [] Fair  [] Poor    Patient requires continued skilled intervention: [x] Yes  [] No        PLAN:   [] Continue per plan of care [] Alter current plan (see comments)  [x] Plan of care initiated [] Hold pending MD visit [] Discharge    Electronically signed by: Benito Brooke PT , OMT-C, [id number]      Note: If patient does not return for scheduled/recommended follow up visits, this note will serve as a discharge from care along with the most recent update on progress.

## 2021-09-29 NOTE — PROGRESS NOTES
[facility], [place] [address]. [place] [address]  Phone: [phone]   Fax:     [phone]                                                       Physical Therapy Certification    Dear Referring Practitioner: SULLY Beaulieu,    We had the pleasure of evaluating the following patient for physical therapy services at [facility]. A summary of our findings can be found in the initial assessment below. This includes our plan of care. If you have any questions or concerns regarding these findings, please do not hesitate to contact me at the office phone number checked above. Thank you for the referral.       Physician Signature:_______________________________Date:__________________  By signing above (or electronic signature), therapists plan is approved by physician        Patient: Yves Patton   : 1950   MRN: [id number]  Referring Physician: Referring Practitioner: SULLY Beaulieu      Evaluation Date: 2021      Medical Diagnosis Information:  Diagnosis: Partial hamstring tear, right, initial encounter (W81.327I)   Treatment Diagnosis: Decreased functional mobility                                         Insurance information: PT Insurance Information: Medicare, [employer] secondary     Precautions/ Contra-indications:   Latex Allergy:  [x]NO      []YES  Preferred Language for Healthcare:   [x]English       []other:    C-SSRS Triggered by Intake questionnaire (Past 2 wk assessment ):   [x] No, Questionnaire did not trigger screening.   [] Yes, Patient intake triggered C-SSRS Screening      [] C-SSRS Screening completed  [] PCP notified via Epic     SUBJECTIVE: Patient stated he was lifting 120 box from [address] a couple weeks ago. Pt was diagnosed with partially torn hamstring, R.   Pt was having difficulty walking and walking up/down steps and driveway. Pt stated he feels like he is making improvements as he has less pain walking up steps. Relevant Medical History:  Functional Scale/Score: WOMAC = 23/96     Pain Scale: 0-3/10 R LE  Easing factors: rest  Provocative factors: walking on hills, bending    Type: []Constant   [x]Intermittent  []Radiating []Localized []other:     Numbness/Tingling: none in LEs    Occupation/School: retired    Living Status/Prior Level of Function: Independent with ADLs and IADLs    OBJECTIVE:   Posture: R knee flexed, minimally decreased R weight shift  Observation: large bruise medial posterior distal R thigh  Squat: 3/4 range, pt attributed difficulty to knee problems  Heel walk (+) R, toe walk (-) B  SLS R 1 sec, L 3 sec    Functional Mobility/Transfers: I    Palpation: minimal tenderness R semimembranosis    Gait: (include devices/WB status) decreased R weight shift, decreased R knee terminal knee extension    ROM 9/29/21    LE L and R WFL    Trunk flexion Decreased 50%    Trunk extension WFL    Flexibility hamstrings 90/90 test L -15  R -25     Strength  LEFT RIGHT   HIP Flexors 5/5 5/5   HIP Abductors 5/5 5/5   Hip IR  5/5   Hip ER  5/5   HIP Ext 5/5 5/5   Hip ADD 3-/5 5/5   Knee EXT (quad) 5/5 5/5   Knee Flex (HS) 5/5 5/5   Ankle DF 5/5 5/5   Ankle PF 5/5 5/5   Ankle Inv 5/5 5/5   Ankle EV 5/5 5/5     Reflexes/Sensation:    [x]Dermatomes/Myotomes intact    [x]Reflexes equal and normal bilaterally   []Other:                       [x] Patient history, allergies, meds reviewed. Medical chart reviewed. See intake form. Review Of Systems (ROS):  [x]Performed Review of systems (Integumentary, CardioPulmonary, Neurological) by intake and observation. Intake form has been scanned into medical record. Patient has been instructed to contact their primary care physician regarding ROS issues if not already being addressed at this time.       Co-morbidities/Complexities (which will affect course of rehabilitation):  []None Arthritic conditions   []Rheumatoid arthritis (M05.9)  []Osteoarthritis (M19.91)   Cardiovascular conditions   [x]Hypertension (I10)  [x]Hyperlipidemia (E78.5)  []Angina pectoris (I20)  []Atherosclerosis (I70)  []CVA Musculoskeletal conditions   []Disc pathology   []Congenital spine pathologies   []Prior surgical intervention  []Osteoporosis (M81.8)  []Osteopenia (M85.8)   Endocrine conditions   []Hypothyroid (E03.9)  []Hyperthyroid Gastrointestinal conditions   []Constipation (T66.23)   Metabolic conditions   []Morbid obesity (E66.01)  []Diabetes type 1(E10.65) or 2 (E11.65)   []Neuropathy (G60.9)     Pulmonary conditions   []Asthma (J45)  []Coughing   []COPD (J44.9)   Psychological Disorders  []Anxiety (F41.9)  []Depression (F32.9)   []Other:   [x]Other:     Afib  Cardiac catheterization 1/15  R shoulder RC repair 3/19       Barriers to/and or personal factors that will affect rehab potential:              []Age  []Sex    []Smoker              []Motivation/Lack of Motivation                        []Co-Morbidities              []Cognitive Function, education/learning barriers              []Environmental, home barriers              []profession/work barriers  []past PT/medical experience  []other:  Justification:     Falls Risk Assessment (30 days):   [x] Falls Risk assessed and no intervention required.   [] Falls Risk assessed and Patient requires intervention due to being higher risk   TUG score (>12s at risk):     [] Falls education provided, including         ASSESSMENT:   Functional Impairments:     []Noted lumbar/proximal hip/LE hypomobility   []Decreased LE functional ROM   [x]Decreased core/proximal hip strength and neuromuscular control   [x]Decreased LE functional strength   [x]Reduced balance/proprioceptive control   []other:      Functional Activity Limitations (from functional questionnaire and intake)   []Reduced ability to tolerate prolonged functional positions   []Reduced ability or difficulty with changes of positions or transfers between positions   []Reduced ability to maintain good posture and demonstrate good body mechanics with sitting, bending, and lifting   []Reduced ability to sleep   [] Reduced ability or tolerance with driving and/or computer work   []Reduced ability to perform lifting, carrying tasks   [x]Reduced ability to squat   [x]Reduced ability to forward bend   [x]Reduced ability to ambulate prolonged functional periods/distances/surfaces   [x]Reduced ability to ascend/descend stairs   []Reduced ability to run, hop or jump   []other:     Participation Restrictions   []Reduced participation in self care activities   [x]Reduced participation in home management activities   [x]Reduced participation in work activities   []Reduced participation in social activities. [x]Reduced participation in sport activities. Classification :    []Signs/symptoms consistent with post-surgical status including decreased ROM, strength and function.    [x]Signs/symptoms consistent with joint sprain/strain   []Signs/symptoms consistent with patella-femoral syndrome   []Signs/symptoms consistent with knee OA/hip OA   []Signs/symptoms consistent with internal derangement of knee/Hip   []Signs/symptoms consistent with functional hip weakness/NMR control      []Signs/symptoms consistent with tendinitis/tendinosis    []signs/symptoms consistent with pathology which may benefit from Dry needling      []other:      Prognosis/Rehab Potential:      []Excellent   [x]Good    []Fair   []Poor    Tolerance of evaluation/treatment:    []Excellent   [x]Good    []Fair   []Poor    Physical Therapy Evaluation Complexity Justification  [x] A history of present problem with:  [] no personal factors and/or comorbidities that impact the plan of care;  [x]1-2 personal factors and/or comorbidities that impact the plan of care  []3 personal factors and/or comorbidities that impact the plan of care  [x] An examination of body systems using standardized tests and measures addressing any of the following: body structures and functions (impairments), activity limitations, and/or participation restrictions;:  [] a total of 1-2 or more elements   [x] a total of 3 or more elements   [] a total of 4 or more elements   [x] A clinical presentation with:  [] stable and/or uncomplicated characteristics   [] evolving clinical presentation with changing characteristics  [] unstable and unpredictable characteristics;   [x] Clinical decision making of [] low, [] moderate, [] high complexity using standardized patient assessment instrument and/or measurable assessment of functional outcome. [x] EVAL (LOW) 18921 (typically 20 minutes face-to-face)  [] EVAL (MOD) 62215 (typically 30 minutes face-to-face)  [] EVAL (HIGH) 00658 (typically 45 minutes face-to-face)  [] RE-EVAL     PLAN:  Frequency/Duration:  1-2 days per week for 4-6 Weeks:  Interventions:  [x]  Therapeutic exercise including: strength training, ROM, for Lower extremity and core   [x]  NMR activation and proprioception for LE, Glutes and Core   [x]  Manual therapy as indicated for LE, Hip and spine to include: Dry Needling/IASTM, STM, PROM, Gr I-IV mobilizations, manipulation. [x] Modalities as needed that may include: thermal agents, E-stim, Biofeedback, US, iontophoresis as indicated  [x] Patient education on joint protection, postural re-education, activity modification, progression of HEP. HEP instruction: Access Code: [email or web address]  URL: [email or web address]  Date: 09/29/2021  Prepared by: Erika Son    Exercises  Supine Bridge - 1 x daily - 7 x weekly - 10 reps - 5 hold  Supine Hip Adduction Isometric with Ball - 1 x daily - 7 x weekly - 10 reps - 10 hold  Supine Straight Leg Raises - 1 x daily - 7 x weekly - 2 sets - 10 reps  Straight Leg Raise with External Rotation - 1 x daily - 7 x weekly - 2 sets - 10 reps  Supine Isometric Hamstring Set - 1 x daily - 7 x weekly - 10 reps - 5-10 hold  Prone Hip Extension - Two Pillows - 1 x daily - 7 x weekly - 10 reps  Prone Knee Flexion AROM - 1 x daily - 7 x weekly - 20 reps  Standing Hamstring Stretch on Chair - 1 x daily - 7 x weekly - 2 reps - 30 hold  Standing Gastroc Stretch - 1 x daily - 7 x weekly - 2 reps - 30 hold      GOALS:  Patient stated goal: \"use of right leg, climbing, etc.\"  [] Progressing: [] Met: [] Not Met: [] Adjusted    Therapist goals for Patient:   Short Term Goals: To be achieved in: 2 weeks  1. Independent in HEP and progression per patient tolerance, in order to prevent re-injury. [] Progressing: [] Met: [] Not Met: [] Adjusted  2. Patient will have a decrease in pain to facilitate improvement in movement, function, and ADLs as indicated by Functional Deficits. [] Progressing: [] Met: [] Not Met: [] Adjusted    Long Term Goals: To be achieved in: 6 weeks  1. Disability index score of 10% or less for the [facility] to assist with reaching prior level of function. [] Progressing: [] Met: [] Not Met: [] Adjusted  2. Patient will demonstrate increased hamstrings flexibility to allow for proper joint functioning as indicated by patients Functional Deficits. [] Progressing: [] Met: [] Not Met: [] Adjusted  3. Patient will demonstrate an increase in Strength to good proximal hip strength and control, within 5lb HHD in LE to allow for proper functional mobility as indicated by patients Functional Deficits. [] Progressing: [] Met: [] Not Met: [] Adjusted  4. Patient will return to [facility] deer without increased symptoms or restriction. [] Progressing: [] Met: [] Not Met: [] Adjusted  5. Patient will be able to climbing stairs and hills without pain.   [] Progressing: [] Met: [] Not Met: [] Adjusted     Electronically signed by:  Sarita Hodgkin, PT , OMT-C, [id number]        Note: If patient does not return for scheduled/recommended follow up visits, this note will serve as a discharge from care along with the most recent update on progress.

## 2021-09-30 DIAGNOSIS — E78.2 MIXED HYPERLIPIDEMIA: ICD-10-CM

## 2021-09-30 RX ORDER — SIMVASTATIN 40 MG
TABLET ORAL
Qty: 90 TABLET | Refills: 1 | Status: SHIPPED | OUTPATIENT
Start: 2021-09-30 | End: 2022-03-31

## 2021-10-04 ENCOUNTER — HOSPITAL ENCOUNTER (OUTPATIENT)
Dept: PHYSICAL THERAPY | Age: 71
Setting detail: THERAPIES SERIES
Discharge: HOME | End: 2021-10-04
Payer: MEDICARE

## 2021-10-04 PROCEDURE — 97112 NEUROMUSCULAR REEDUCATION: CPT

## 2021-10-04 PROCEDURE — 97110 THERAPEUTIC EXERCISES: CPT

## 2021-10-04 PROCEDURE — 97140 MANUAL THERAPY 1/> REGIONS: CPT

## 2021-10-04 NOTE — FLOWSHEET NOTE
tenderness R semimembranosis   Gait: (include devices/WB status) decreased R weight shift, decreased R knee terminal knee extension     Test measurements:      ROM 9/29/21     LE L and R WFL     Trunk flexion Decreased 50%     Trunk extension WFL     Flexibility hamstrings 90/90 test L -15  R -25      Strength - WFL LEFT        RESTRICTIONS/PRECAUTIONS:     Exercises/Interventions:     Therapeutic Ex (99082)   Min: 25 Reps/Resistance Notes/CUES   Nu Step add    Stretch  Hamstrings    Gastroc incline   3x30\"    2x30\"   On ground, used door, contract/relax   [employer] with add set  Bridge, heels on swiss ball 5x  10x  10x                        Manual Intervention (18441)  Min: 15     STM R semimembranosus 15'                             NMR re-education (28841)  Min:  CUES NEEDED   JOSEPH  Knee flexion  Knee extension Settings 8 and 9, 10x each    SLS     Standing t-slide  ABD  EXT     Leg press     Minisquat     Prone HS curl 2#, 2x10    Therapeutic Activity (32834)  Min:                                   Modalities  Min:     IFC with      CP after exercises     MH after exercises            Other Therapeutic Activities: Pt was educated on PT POC, Diagnosis, Prognosis, pathomechanics as well as frequency and duration of scheduling future physical therapy appointments. Time was also taken on this day to answer all patient questions and participation in PT. Reviewed appointment policy in detail with patient and patient verbalized understanding. Home Exercise Program: Patient was instructed in the following for HEP:URL: [email or web address]  Date: 09/29/2021  Prepared by: Rodney Khalil     Exercises  Supine Bridge - 1 x daily - 7 x weekly - 10 reps - 5 hold  Supine Hip Adduction Isometric with Ball - 1 x daily - 7 x weekly - 10 reps - 10 hold  Supine Straight Leg Raises - 1 x daily - 7 x weekly - 2 sets - 10 reps  Straight Leg Raise with External Rotation - 1 x daily - 7 x weekly - 2 sets - 10 reps  Supine Isometric Hamstring Set - 1 x daily - 7 x weekly - 10 reps - 5-10 hold  Prone Hip Extension - Two Pillows - 1 x daily - 7 x weekly - 10 reps  Prone Knee Flexion AROM - 1 x daily - 7 x weekly - 20 reps  Standing Hamstring Stretch on Chair - 1 x daily - 7 x weekly - 2 reps - 30 hold  Standing Gastroc Stretch - 1 x daily - 7 x weekly - 2 reps - 30 hold     . Patient verbalized/demonstrated understanding and was issued written handout. Therapeutic Exercise and NMR EXR  [] (67052) Provided verbal/tactile cueing for activities related to strengthening, flexibility, endurance, ROM for improvements in LE, proximal hip, and core control with self care, mobility, lifting, ambulation.  [] (86953) Provided verbal/tactile cueing for activities related to improving balance, coordination, kinesthetic sense, posture, motor skill, proprioception  to assist with LE, proximal hip, and core control in self care, mobility, lifting, ambulation and eccentric single leg control.      NMR and Therapeutic Activities:    [] (05736 or 77483) Provided verbal/tactile cueing for activities related to improving balance, coordination, kinesthetic sense, posture, motor skill, proprioception and motor activation to allow for proper function of core, proximal hip and LE with self care and ADLs and functional mobility.   [] (52419) Gait Re-education- Provided training and instruction to the patient for proper LE, core and proximal hip recruitment and positioning and eccentric body weight control with ambulation re-education including up and down stairs     Home Exercise Program:    [x] (91538) Reviewed/Progressed HEP activities related to strengthening, flexibility, endurance, ROM of core, proximal hip and LE for functional self-care, mobility, lifting and ambulation/stair navigation   [] (63450)Reviewed/Progressed HEP activities related to improving balance, coordination, kinesthetic sense, posture, motor skill, proprioception of core, proximal hip and LE for self care, mobility, lifting, and ambulation/stair navigation      Manual Treatments:  PROM / STM / Oscillations-Mobs:  G-I, II, III, IV (PA's, Inf., Post.)  [] (20302) Provided manual therapy to mobilize LE, proximal hip and/or LS spine soft tissue/joints for the purpose of modulating pain, promoting relaxation,  increasing ROM, reducing/eliminating soft tissue swelling/inflammation/restriction, improving soft tissue extensibility and allowing for proper ROM for normal function with self care, mobility, lifting and ambulation. Charges:  Timed Code Treatment Minutes: 40   Total Treatment Minutes: 60      [x] EVAL (LOW) 36945 (typically 20 minutes face-to-face)  [] EVAL (MOD) 27213 (typically 30 minutes face-to-face)  [] EVAL (HIGH) 62680 (typically 45 minutes face-to-face)  [] RE-EVAL     [x] EI(52751) x  2   [] Dry needle 1 or 2 Muscles (68006)  [] NMR (05342) x     [] Dry needle 3+ Muscles (79071)  [x] Manual (68364) x     [] Ultrasound (10461) x  [] TA (65304) x     [] Mech Traction (42235)  [] ES(attended) (84124)     [] ES (un) (77444):   [] Vasopump (88099) [] Ionto (53556)   [] Other:    GOALS:  Patient stated goal: \"use of right leg, climbing, etc.\"  []? Progressing: []? Met: []? Not Met: []? Adjusted     Therapist goals for Patient:   Short Term Goals: To be achieved in: 2 weeks  1. Independent in HEP and progression per patient tolerance, in order to prevent re-injury. []? Progressing: []? Met: []? Not Met: []? Adjusted  2. Patient will have a decrease in pain to facilitate improvement in movement, function, and ADLs as indicated by Functional Deficits. []? Progressing: []? Met: []? Not Met: []? Adjusted     Long Term Goals: To be achieved in: 6 weeks  1. Disability index score of 10% or less for the [facility] to assist with reaching prior level of function. []? Progressing: []? Met: []? Not Met: []? Adjusted  2.  Patient will demonstrate increased hamstrings flexibility to allow for proper joint functioning as indicated by patients Functional Deficits. []? Progressing: []? Met: []? Not Met: []? Adjusted  3. Patient will demonstrate an increase in Strength to good proximal hip strength and control, within 5lb HHD in LE to allow for proper functional mobility as indicated by patients Functional Deficits. []? Progressing: []? Met: []? Not Met: []? Adjusted  4. Patient will return to hunting deer without increased symptoms or restriction. []? Progressing: []? Met: []? Not Met: []? Adjusted  5. Patient will be able to climbing stairs and hills without pain. []? Progressing: []? Met: []? Not Met: []? Adjusted         ASSESSMENT:  See eval    Treatment/Activity Tolerance:  [x] Patient tolerated treatment well [] Patient limited by fatique  [] Patient limited by pain  [] Patient limited by other medical complications  [] Other:     Overall Progression Towards Functional goals/ Treatment Progress Update:  [] Patient is progressing as expected towards functional goals listed. [] Progression is slowed due to complexities/Impairments listed. [] Progression has been slowed due to co-morbidities. [x] Plan just implemented, too soon to assess goals progression <30days   [] Goals require adjustment due to lack of progress  [] Patient is not progressing as expected and requires additional follow up with physician  [] Other    Prognosis for POC: [x] Good [] Fair  [] Poor    Patient requires continued skilled intervention: [x] Yes  [] No        PLAN:   [] Continue per plan of care [] Alter current plan (see comments)  [x] Plan of care initiated [] Hold pending MD visit [] Discharge    Electronically signed by: Raji Thompson, PT , OMT-C, [id number]      Note: If patient does not return for scheduled/recommended follow up visits, this note will serve as a discharge from care along with the most recent update on progress.

## 2021-10-06 ENCOUNTER — HOSPITAL ENCOUNTER (OUTPATIENT)
Dept: PHYSICAL THERAPY | Age: 71
Setting detail: THERAPIES SERIES
Discharge: HOME | End: 2021-10-06
Payer: MEDICARE

## 2021-10-06 PROCEDURE — 97112 NEUROMUSCULAR REEDUCATION: CPT

## 2021-10-06 PROCEDURE — 97110 THERAPEUTIC EXERCISES: CPT

## 2021-10-06 PROCEDURE — 97140 MANUAL THERAPY 1/> REGIONS: CPT

## 2021-10-06 NOTE — FLOWSHEET NOTE
[facility] - Outpatient Rehabilitation and Therapy, [place] [address]  Phone: [phone]   Fax:     [phone]      Physical Therapy Treatment Note/ Progress Report:     Date:  10/6/2021    Patient Name:  Melecio Carlson    :  1950  MRN: [id number]    Pertinent Medical History:     Medical/Treatment Diagnosis Information:  · Diagnosis: Partial hamstring tear, right, initial encounter (S76.311A)  · Treatment Diagnosis: Decreased functional mobility    Insurance/Certification information:  PT Insurance Information: Medicare, [employer] secondary  Physician Information:  Referring Practitioner: SULLY Romero  Plan of care signed (Y/N): Y    Date of Patient follow up with Physician:      Progress Report: []  Yes  [x]  No     Date Range for reporting period:  Beginnin21  Ending:      Progress report due (10 Rx/or 30 days whichever is less):      Recertification due (POC duration/ or 90 days whichever is less):      Visit # POC/Insurance Allowable Auth Needed   3 12 []Yes   []No     Latex Allergy:  [x]NO      []YES  Preferred Language for Healthcare:   [x]English       []other:  Functional Scale: WOMAC = 21 23/96  Pain level:  0-3/10     History of Injury:  Patient stated he was lifting 120 box from [address] a couple weeks ago. Pt was diagnosed with partially torn hamstring, R. Pt was having difficulty walking and walking up/down steps and driveway. Pt stated he feels like he is making improvements as he has less pain walking up steps.     SUBJECTIVE:  See eval  10/4/21 groin was sore for 1 day following initial PT evaluation    OBJECTIVE:    Observation:    21  Posture: R knee flexed, minimally decreased R weight shift  Observation: large bruise medial posterior distal R thigh  Squat: 3/4 range, pt attributed difficulty to knee problems  Heel walk (+) R  SLS R 1 sec, L 3 sec  Palpation: minimal 1 x daily - 7 x weekly - 2 sets - 10 reps  Supine Isometric Hamstring Set - 1 x daily - 7 x weekly - 10 reps - 5-10 hold  Prone Hip Extension - Two Pillows - 1 x daily - 7 x weekly - 10 reps  Prone Knee Flexion AROM - 1 x daily - 7 x weekly - 20 reps  Standing Hamstring Stretch on Chair - 1 x daily - 7 x weekly - 2 reps - 30 hold  Standing Gastroc Stretch - 1 x daily - 7 x weekly - 2 reps - 30 hold     . Patient verbalized/demonstrated understanding and was issued written handout. Therapeutic Exercise and NMR EXR  [] (66079) Provided verbal/tactile cueing for activities related to strengthening, flexibility, endurance, ROM for improvements in LE, proximal hip, and core control with self care, mobility, lifting, ambulation.  [] (48206) Provided verbal/tactile cueing for activities related to improving balance, coordination, kinesthetic sense, posture, motor skill, proprioception  to assist with LE, proximal hip, and core control in self care, mobility, lifting, ambulation and eccentric single leg control.      NMR and Therapeutic Activities:    [] (78970 or 81454) Provided verbal/tactile cueing for activities related to improving balance, coordination, kinesthetic sense, posture, motor skill, proprioception and motor activation to allow for proper function of core, proximal hip and LE with self care and ADLs and functional mobility.   [] (16596) Gait Re-education- Provided training and instruction to the patient for proper LE, core and proximal hip recruitment and positioning and eccentric body weight control with ambulation re-education including up and down stairs     Home Exercise Program:    [x] (92549) Reviewed/Progressed HEP activities related to strengthening, flexibility, endurance, ROM of core, proximal hip and LE for functional self-care, mobility, lifting and ambulation/stair navigation   [] (17418)Reviewed/Progressed HEP activities related to improving balance, coordination, kinesthetic sense, posture, motor skill, proprioception of core, proximal hip and LE for self care, mobility, lifting, and ambulation/stair navigation      Manual Treatments:  PROM / STM / Oscillations-Mobs:  G-I, II, III, IV (PA's, Inf., Post.)  [] (31762) Provided manual therapy to mobilize LE, proximal hip and/or LS spine soft tissue/joints for the purpose of modulating pain, promoting relaxation,  increasing ROM, reducing/eliminating soft tissue swelling/inflammation/restriction, improving soft tissue extensibility and allowing for proper ROM for normal function with self care, mobility, lifting and ambulation. Charges:  Timed Code Treatment Minutes: 40   Total Treatment Minutes: 60      [x] EVAL (LOW) 14241 (typically 20 minutes face-to-face)  [] EVAL (MOD) 83989 (typically 30 minutes face-to-face)  [] EVAL (HIGH) 77930 (typically 45 minutes face-to-face)  [] RE-EVAL     [x] TV(34347) x  2   [] Dry needle 1 or 2 Muscles (10277)  [] NMR (56230) x     [] Dry needle 3+ Muscles (12030)  [x] Manual (81448) x     [] Ultrasound (67553) x  [] TA (58676) x     [] Mech Traction (72551)  [] ES(attended) (89727)     [] ES (un) (52707):   [] Vasopump (21471) [] Ionto (61097)   [] Other:    GOALS:  Patient stated goal: \"use of right leg, climbing, etc.\"  []? Progressing: []? Met: []? Not Met: []? Adjusted     Therapist goals for Patient:   Short Term Goals: To be achieved in: 2 weeks  1. Independent in HEP and progression per patient tolerance, in order to prevent re-injury. []? Progressing: []? Met: []? Not Met: []? Adjusted  2. Patient will have a decrease in pain to facilitate improvement in movement, function, and ADLs as indicated by Functional Deficits. []? Progressing: []? Met: []? Not Met: []? Adjusted     Long Term Goals: To be achieved in: 6 weeks  1. Disability index score of 10% or less for the [facility] to assist with reaching prior level of function. []? Progressing: []? Met: []? Not Met: []? Adjusted  2.  Patient will demonstrate increased hamstrings flexibility to allow for proper joint functioning as indicated by patients Functional Deficits. []? Progressing: []? Met: []? Not Met: []? Adjusted  3. Patient will demonstrate an increase in Strength to good proximal hip strength and control, within 5lb HHD in LE to allow for proper functional mobility as indicated by patients Functional Deficits. []? Progressing: []? Met: []? Not Met: []? Adjusted  4. Patient will return to hunting deer without increased symptoms or restriction. []? Progressing: []? Met: []? Not Met: []? Adjusted  5. Patient will be able to climbing stairs and hills without pain. []? Progressing: []? Met: []? Not Met: []? Adjusted         ASSESSMENT:  See eval    Treatment/Activity Tolerance:  [x] Patient tolerated treatment well [] Patient limited by fatique  [] Patient limited by pain  [] Patient limited by other medical complications  [] Other:     Overall Progression Towards Functional goals/ Treatment Progress Update:  [] Patient is progressing as expected towards functional goals listed. [] Progression is slowed due to complexities/Impairments listed. [] Progression has been slowed due to co-morbidities. [x] Plan just implemented, too soon to assess goals progression <30days   [] Goals require adjustment due to lack of progress  [] Patient is not progressing as expected and requires additional follow up with physician  [] Other    Prognosis for POC: [x] Good [] Fair  [] Poor    Patient requires continued skilled intervention: [x] Yes  [] No        PLAN:   [] Continue per plan of care [] Alter current plan (see comments)  [x] Plan of care initiated [] Hold pending MD visit [] Discharge    Electronically signed by: Mann Maki PT , OMT-C, [id number]      Note: If patient does not return for scheduled/recommended follow up visits, this note will serve as a discharge from care along with the most recent update on progress.

## 2021-10-11 ENCOUNTER — HOSPITAL ENCOUNTER (OUTPATIENT)
Dept: PHYSICAL THERAPY | Age: 71
Setting detail: THERAPIES SERIES
Discharge: HOME | End: 2021-10-11
Payer: MEDICARE

## 2021-10-11 PROCEDURE — 97110 THERAPEUTIC EXERCISES: CPT

## 2021-10-11 PROCEDURE — 97112 NEUROMUSCULAR REEDUCATION: CPT

## 2021-10-11 PROCEDURE — 97140 MANUAL THERAPY 1/> REGIONS: CPT

## 2021-10-11 NOTE — FLOWSHEET NOTE
[facility] - Outpatient Rehabilitation and Therapy, [address] [place] [address]  Phone: [phone]   Fax:     [phone]      Physical Therapy Treatment Note/ Progress Report:     Date:  10/11/2021    Patient Name:  Melecio Carlson    :  1950  MRN: [id number]    Pertinent Medical History:     Medical/Treatment Diagnosis Information:  · Diagnosis: Partial hamstring tear, right, initial encounter (S76.311A)  · Treatment Diagnosis: Decreased functional mobility    Insurance/Certification information:  PT Insurance Information: Medicare, [employer] secondary  Physician Information:  Referring Practitioner: SULLY Romero  Plan of care signed (Y/N): Y    Date of Patient follow up with Physician:      Progress Report: []  Yes  [x]  No     Date Range for reporting period:  Beginnin21  Ending:      Progress report due (10 Rx/or 30 days whichever is less):      Recertification due (POC duration/ or 90 days whichever is less):      Visit # POC/Insurance Allowable Auth Needed   4 12 []Yes   []No     Latex Allergy:  [x]NO      []YES  Preferred Language for Healthcare:   [x]English       []other:  Functional Scale: WOMAC = 21 23/96  Pain level:  0-3/10     History of Injury:  Patient stated he was lifting 120 box from [address] a couple weeks ago. Pt was diagnosed with partially torn hamstring, R. Pt was having difficulty walking and walking up/down steps and driveway. Pt stated he feels like he is making improvements as he has less pain walking up steps.     SUBJECTIVE:  See eval  10/4/21 groin was sore for 1 day following initial PT evaluation    OBJECTIVE:    Observation:    21  Posture: R knee flexed, minimally decreased R weight shift  Observation: large bruise medial posterior distal R thigh  Squat: 3/4 range, pt attributed difficulty to knee problems  Heel walk (+) R  SLS R 1 sec, L 3 sec  Palpation: minimal tenderness R semimembranosis   Gait: (include devices/WB status) decreased R weight shift, decreased R knee terminal knee extension     Test measurements:      ROM 9/29/21     LE L and R WFL     Trunk flexion Decreased 50%     Trunk extension WFL     Flexibility hamstrings 90/90 test L -15  R -25      Strength - WFL LEFT        RESTRICTIONS/PRECAUTIONS:     Exercises/Interventions:     Therapeutic Ex (93557)   Min: 15 Reps/Resistance Notes/CUES        Stretch  Hamstrings  Gastroc incline   2x30\"  2x30\"    Bridge with add set  Bridge with ABD set  Bridge, heels on swiss ball  Bridge with march 10x  10x  10x  10x    Prone over swiss ball  Hip extension  Trunk extension   20x, 2\" hold  10x                   Manual Intervention (99524)  Min: 18     STM R semimembranosus 18'                             NMR re-education (78332)  Min: 15  CUES NEEDED   JOSEPH  Knee flexion  Knee extension Settings 8 and 9, 10x each    Concentric HS curl 10kg, 3x10 L    Standing t-slide  EXT   Green, 20x L    Leg press     Prone hip extension 10x, 5\" hold    Prone hip IR/ER 2#, 30x    Prone HS curl 4#, 3x15    Therapeutic Activity (28946)  Min:                                   Modalities  Min:     IFC with      CP after exercises     MH after exercises            Other Therapeutic Activities: Pt was educated on PT POC, Diagnosis, Prognosis, pathomechanics as well as frequency and duration of scheduling future physical therapy appointments. Time was also taken on this day to answer all patient questions and participation in PT. Reviewed appointment policy in detail with patient and patient verbalized understanding. Home Exercise Program: Patient was instructed in the following for HEP:URL: [email or web address]  Date: 09/29/2021  Prepared by: Edmundo Pierre     Exercises  Supine Bridge - 1 x daily - 7 x weekly - 10 reps - 5 hold  Supine Hip Adduction Isometric with Ball - 1 x daily - 7 x weekly - 10 reps - 10 hold  Supine Straight Leg Raises - 1 x daily - 7 x weekly - 2 sets - 10 reps  Straight Leg Raise with External Rotation - 1 x daily - 7 x weekly - 2 sets - 10 reps  Supine Isometric Hamstring Set - 1 x daily - 7 x weekly - 10 reps - 5-10 hold  Prone Hip Extension - Two Pillows - 1 x daily - 7 x weekly - 10 reps  Prone Knee Flexion AROM - 1 x daily - 7 x weekly - 20 reps  Standing Hamstring Stretch on Chair - 1 x daily - 7 x weekly - 2 reps - 30 hold  Standing Gastroc Stretch - 1 x daily - 7 x weekly - 2 reps - 30 hold     . Patient verbalized/demonstrated understanding and was issued written handout. Therapeutic Exercise and NMR EXR  [] (92708) Provided verbal/tactile cueing for activities related to strengthening, flexibility, endurance, ROM for improvements in LE, proximal hip, and core control with self care, mobility, lifting, ambulation.  [] (13257) Provided verbal/tactile cueing for activities related to improving balance, coordination, kinesthetic sense, posture, motor skill, proprioception  to assist with LE, proximal hip, and core control in self care, mobility, lifting, ambulation and eccentric single leg control.      NMR and Therapeutic Activities:    [] (55386 or 73805) Provided verbal/tactile cueing for activities related to improving balance, coordination, kinesthetic sense, posture, motor skill, proprioception and motor activation to allow for proper function of core, proximal hip and LE with self care and ADLs and functional mobility.   [] (18015) Gait Re-education- Provided training and instruction to the patient for proper LE, core and proximal hip recruitment and positioning and eccentric body weight control with ambulation re-education including up and down stairs     Home Exercise Program:    [x] (34436) Reviewed/Progressed HEP activities related to strengthening, flexibility, endurance, ROM of core, proximal hip and LE for functional self-care, mobility, lifting and ambulation/stair navigation   [] (71908)Reviewed/Progressed HEP activities related to improving balance, coordination, kinesthetic sense, posture, motor skill, proprioception of core, proximal hip and LE for self care, mobility, lifting, and ambulation/stair navigation      Manual Treatments:  PROM / STM / Oscillations-Mobs:  G-I, II, III, IV (PA's, Inf., Post.)  [] (91350) Provided manual therapy to mobilize LE, proximal hip and/or LS spine soft tissue/joints for the purpose of modulating pain, promoting relaxation,  increasing ROM, reducing/eliminating soft tissue swelling/inflammation/restriction, improving soft tissue extensibility and allowing for proper ROM for normal function with self care, mobility, lifting and ambulation. Charges:  Timed Code Treatment Minutes: 48   Total Treatment Minutes: 48      [] EVAL (LOW) 75162 (typically 20 minutes face-to-face)  [] EVAL (MOD) 17468 (typically 30 minutes face-to-face)  [] EVAL (HIGH) 69903 (typically 45 minutes face-to-face)  [] RE-EVAL     [x] KV(56728) x  2   [] Dry needle 1 or 2 Muscles (00605)  [x] NMR (23782) x     [] Dry needle 3+ Muscles (38404)  [x] Manual (08964) x     [] Ultrasound (44224) x  [] TA (94481) x     [] Mech Traction (41030)  [] ES(attended) (25878)     [] ES (un) (52395):   [] Vasopump (09906) [] Ionto (42253)   [] Other:    GOALS:  Patient stated goal: \"use of right leg, climbing, etc.\"  []? Progressing: []? Met: []? Not Met: []? Adjusted     Therapist goals for Patient:   Short Term Goals: To be achieved in: 2 weeks  1. Independent in HEP and progression per patient tolerance, in order to prevent re-injury. []? Progressing: []? Met: []? Not Met: []? Adjusted  2. Patient will have a decrease in pain to facilitate improvement in movement, function, and ADLs as indicated by Functional Deficits. []? Progressing: []? Met: []? Not Met: []? Adjusted     Long Term Goals: To be achieved in: 6 weeks  1.  Disability index score of 10% or less for the [facility] to assist with reaching prior level of function. []? Progressing: []? Met: []? Not Met: []? Adjusted  2. Patient will demonstrate increased hamstrings flexibility to allow for proper joint functioning as indicated by patients Functional Deficits. []? Progressing: []? Met: []? Not Met: []? Adjusted  3. Patient will demonstrate an increase in Strength to good proximal hip strength and control, within 5lb HHD in LE to allow for proper functional mobility as indicated by patients Functional Deficits. []? Progressing: []? Met: []? Not Met: []? Adjusted  4. Patient will return to hunting deer without increased symptoms or restriction. []? Progressing: []? Met: []? Not Met: []? Adjusted  5. Patient will be able to climbing stairs and hills without pain. []? Progressing: []? Met: []? Not Met: []? Adjusted         ASSESSMENT:  See eval    Treatment/Activity Tolerance:  [x] Patient tolerated treatment well [] Patient limited by fatique  [] Patient limited by pain  [] Patient limited by other medical complications  [] Other:     Overall Progression Towards Functional goals/ Treatment Progress Update:  [] Patient is progressing as expected towards functional goals listed. [] Progression is slowed due to complexities/Impairments listed. [] Progression has been slowed due to co-morbidities.   [x] Plan just implemented, too soon to assess goals progression <30days   [] Goals require adjustment due to lack of progress  [] Patient is not progressing as expected and requires additional follow up with physician  [] Other    Prognosis for POC: [x] Good [] Fair  [] Poor    Patient requires continued skilled intervention: [x] Yes  [] No        PLAN:   [] Continue per plan of care [] Alter current plan (see comments)  [x] Plan of care initiated [] Hold pending MD visit [] Discharge    Electronically signed by: Margy Garcia PT , OMT-C, [id number]      Note: If patient does not return for scheduled/recommended follow up visits, this note will serve as a discharge from care along with the most recent update on progress.

## 2021-10-13 ENCOUNTER — HOSPITAL ENCOUNTER (OUTPATIENT)
Dept: PHYSICAL THERAPY | Age: 71
Setting detail: THERAPIES SERIES
Discharge: HOME | End: 2021-10-13
Payer: MEDICARE

## 2021-10-13 PROCEDURE — 97110 THERAPEUTIC EXERCISES: CPT

## 2021-10-13 PROCEDURE — 97140 MANUAL THERAPY 1/> REGIONS: CPT

## 2021-10-13 PROCEDURE — 97112 NEUROMUSCULAR REEDUCATION: CPT

## 2021-10-13 NOTE — FLOWSHEET NOTE
[facility], [address] [place] [address]  Phone: [phone]   Fax:     [phone]      Physical Therapy Treatment Note/ Progress Report:     Date:  10/13/2021    Patient Name:  Jim Cuba    :  1950  MRN: [id number]    Pertinent Medical History:     Medical/Treatment Diagnosis Information:  · Diagnosis: Partial hamstring tear, right, initial encounter (S76.311A)  · Treatment Diagnosis: Decreased functional mobility    Insurance/Certification information:  PT Insurance Information: Medicare, Humana secondary  Physician Information:  Referring Practitioner: SULLY Soliz  Plan of care signed (Y/N): Y    Date of Patient follow up with Physician:      Progress Report: []  Yes  [x]  No     Date Range for reporting period:  Beginnin21  Ending:      Progress report due (10 Rx/or 30 days whichever is less):      Recertification due (POC duration/ or 90 days whichever is less):      Visit # POC/Insurance Allowable Auth Needed   5 12 []Yes   []No     Latex Allergy:  [x]NO      []YES  Preferred Language for Healthcare:   [x]English       []other:  Functional Scale: WOMAC = 21 23/96  Pain level:  0-3/10     History of Injury:  Patient stated he was lifting 120 box from [address] a couple weeks ago. Pt was diagnosed with partially torn hamstring, R. Pt was having difficulty walking and walking up/down steps and driveway. Pt stated he feels like he is making improvements as he has less pain walking up steps.     SUBJECTIVE:  See eval  10/4/21 groin was sore for 1 day following initial PT evaluation    OBJECTIVE:    Observation:    21  Posture: R knee flexed, minimally decreased R weight shift  Observation: large bruise medial posterior distal R thigh  Squat: 3/4 range, pt attributed difficulty to knee problems  Heel walk (+) R  SLS R 1 sec, L 3 sec  Palpation: minimal tenderness R semimembranosis   Gait: (include devices/WB status) decreased R weight shift, decreased R knee terminal knee extension     Test measurements:      ROM 9/29/21  10/13/21   LE L and R WFL NT    Trunk flexion Decreased 50%     Flexibility hamstrings 90/90 test L -15  R -25    R -12        RESTRICTIONS/PRECAUTIONS:     Exercises/Interventions:     Therapeutic Ex (45409)   Min: 15 Reps/Resistance Notes/CUES        Stretch  Hamstrings  Gastroc incline   2x30\"  2x30\"    Bridge, heels on swiss ball     Knees extended     Knees flexed  Bridge with march 10x  10x  10x  10x    Prone over swiss ball  Hip extension  Trunk extension   20x, 2\" hold  10x    Prone over table edge trunk extension 10x PT stabilized LEs             Manual Intervention (82211)  Min: 18     STM R semimembranosus 18'                             NMR re-education (40414)  Min: 15  CUES NEEDED   JOSEPH  Knee flexion  Knee extension Settings 8 and 9, 10x each    Concentric HS curl 10kg, 3x10 L    Standing t-slide  EXT   Orange, 20x L    Leg press     Prone hip extension 10x, 5\" hold    Prone hip IR/ER 3#, 30x    Prone HS curl 4#, 3x15    Therapeutic Activity (86193)  Min:                                   Modalities  Min:     IFC with      CP after exercises     MH after exercises            Other Therapeutic Activities: Pt was educated on PT POC, Diagnosis, Prognosis, pathomechanics as well as frequency and duration of scheduling future physical therapy appointments. Time was also taken on this day to answer all patient questions and participation in PT. Reviewed appointment policy in detail with patient and patient verbalized understanding. Home Exercise Program: Patient was instructed in the following for HEP:URL: [email or web address]  Date: 09/29/2021  Prepared by: Umberto Baig     Exercises  Supine Bridge - 1 x daily - 7 x weekly - 10 reps - 5 hold  Supine Hip Adduction Isometric with Ball - 1 x daily - 7 x weekly - 10 reps - 10 hold  Supine Straight Leg Raises - 1 x daily - 7 x weekly - 2 sets - 10 reps  Straight Leg Raise with External Rotation - 1 x daily - 7 x weekly - 2 sets - 10 reps  Supine Isometric Hamstring Set - 1 x daily - 7 x weekly - 10 reps - 5-10 hold  Prone Hip Extension - Two Pillows - 1 x daily - 7 x weekly - 10 reps  Prone Knee Flexion AROM - 1 x daily - 7 x weekly - 20 reps  Standing Hamstring Stretch on Chair - 1 x daily - 7 x weekly - 2 reps - 30 hold  Standing Gastroc Stretch - 1 x daily - 7 x weekly - 2 reps - 30 hold     . Patient verbalized/demonstrated understanding and was issued written handout. Therapeutic Exercise and NMR EXR  [] (23826) Provided verbal/tactile cueing for activities related to strengthening, flexibility, endurance, ROM for improvements in LE, proximal hip, and core control with self care, mobility, lifting, ambulation.  [] (29267) Provided verbal/tactile cueing for activities related to improving balance, coordination, kinesthetic sense, posture, motor skill, proprioception  to assist with LE, proximal hip, and core control in self care, mobility, lifting, ambulation and eccentric single leg control.      NMR and Therapeutic Activities:    [] (20336 or 67146) Provided verbal/tactile cueing for activities related to improving balance, coordination, kinesthetic sense, posture, motor skill, proprioception and motor activation to allow for proper function of core, proximal hip and LE with self care and ADLs and functional mobility.   [] (89434) Gait Re-education- Provided training and instruction to the patient for proper LE, core and proximal hip recruitment and positioning and eccentric body weight control with ambulation re-education including up and down stairs     Home Exercise Program:    [x] (17119) Reviewed/Progressed HEP activities related to strengthening, flexibility, endurance, ROM of core, proximal hip and LE for functional self-care, mobility, lifting and ambulation/stair navigation   [] (84442)Reviewed/Progressed HEP activities related to improving balance, coordination, kinesthetic sense, posture, motor skill, proprioception of core, proximal hip and LE for self care, mobility, lifting, and ambulation/stair navigation      Manual Treatments:  PROM / STM / Oscillations-Mobs:  G-I, II, III, IV (PA's, Inf., Post.)  [] (56886) Provided manual therapy to mobilize LE, proximal hip and/or LS spine soft tissue/joints for the purpose of modulating pain, promoting relaxation,  increasing ROM, reducing/eliminating soft tissue swelling/inflammation/restriction, improving soft tissue extensibility and allowing for proper ROM for normal function with self care, mobility, lifting and ambulation. Charges:  Timed Code Treatment Minutes: 48   Total Treatment Minutes: 48      [] EVAL (LOW) 36566 (typically 20 minutes face-to-face)  [] EVAL (MOD) 56954 (typically 30 minutes face-to-face)  [] EVAL (HIGH) 59536 (typically 45 minutes face-to-face)  [] RE-EVAL     [x] BC(41796) x     [] Dry needle 1 or 2 Muscles (78278)  [x] NMR (05351) x     [] Dry needle 3+ Muscles (73244)  [x] Manual (27116) x     [] Ultrasound (91084) x  [] TA (83764) x     [] Mech Traction (93679)  [] ES(attended) (75967)     [] ES (un) (44086):   [] Vasopump (73715) [] Ionto (21572)   [] Other:    GOALS:  Patient stated goal: \"use of right leg, climbing, etc.\"  []? Progressing: []? Met: []? Not Met: []? Adjusted     Therapist goals for Patient:   Short Term Goals: To be achieved in: 2 weeks  1. Independent in HEP and progression per patient tolerance, in order to prevent re-injury. []? Progressing: []? Met: []? Not Met: []? Adjusted  2. Patient will have a decrease in pain to facilitate improvement in movement, function, and ADLs as indicated by Functional Deficits. []? Progressing: []? Met: []? Not Met: []? Adjusted     Long Term Goals: To be achieved in: 6 weeks  1.  Disability index score of 10% or less for the [facility] to assist with reaching prior level of function. []? Progressing: []? Met: []? Not Met: []? Adjusted  2. Patient will demonstrate increased hamstrings flexibility to allow for proper joint functioning as indicated by patients Functional Deficits. []? Progressing: []? Met: []? Not Met: []? Adjusted  3. Patient will demonstrate an increase in Strength to good proximal hip strength and control, within 5lb HHD in LE to allow for proper functional mobility as indicated by patients Functional Deficits. []? Progressing: []? Met: []? Not Met: []? Adjusted  4. Patient will return to hunting deer without increased symptoms or restriction. []? Progressing: []? Met: []? Not Met: []? Adjusted  5. Patient will be able to climbing stairs and hills without pain. []? Progressing: []? Met: []? Not Met: []? Adjusted         ASSESSMENT:  See eval    Treatment/Activity Tolerance:  [x] Patient tolerated treatment well [] Patient limited by fatique  [] Patient limited by pain  [] Patient limited by other medical complications  [] Other:     Overall Progression Towards Functional goals/ Treatment Progress Update:  [] Patient is progressing as expected towards functional goals listed. [] Progression is slowed due to complexities/Impairments listed. [] Progression has been slowed due to co-morbidities.   [x] Plan just implemented, too soon to assess goals progression <30days   [] Goals require adjustment due to lack of progress  [] Patient is not progressing as expected and requires additional follow up with physician  [] Other    Prognosis for POC: [x] Good [] Fair  [] Poor    Patient requires continued skilled intervention: [x] Yes  [] No        PLAN:   [] Continue per plan of care [] Alter current plan (see comments)  [x] Plan of care initiated [] Hold pending MD visit [] Discharge    Electronically signed by: Jaime Ortiz PT , OMT-C, [id number]      Note: If patient does not return for scheduled/recommended follow up visits, this note will serve as a discharge from care along with the most recent update on progress.

## 2021-10-18 ENCOUNTER — HOSPITAL ENCOUNTER (OUTPATIENT)
Dept: PHYSICAL THERAPY | Age: 71
Setting detail: THERAPIES SERIES
Discharge: HOME | End: 2021-10-18
Payer: MEDICARE

## 2021-10-18 PROCEDURE — 97112 NEUROMUSCULAR REEDUCATION: CPT

## 2021-10-18 PROCEDURE — 97110 THERAPEUTIC EXERCISES: CPT

## 2021-10-18 PROCEDURE — 97140 MANUAL THERAPY 1/> REGIONS: CPT

## 2021-10-18 NOTE — FLOWSHEET NOTE
[place] [facility] [place] [address] [place] [address]  Phone: [phone]   Fax:     [phone]       Physical Therapy Discharge Summary    Dear Janet Naomy Marcelo,    We had the pleasure of treating the following patient for physical therapy services at [address] [place] [address]. A summary of our findings can be found in the discharge summary below. If you have any questions or concerns regarding these findings, please do not hesitate to contact me at the office phone number above. Thank you for the referral.       Overall Response to Treatment:   [x]Patient is responding well to treatment and improvement is noted with regards  to goals   []Patient should continue to improve in reasonable time if they continue HEP   []Patient has plateaued and is no longer responding to skilled PT intervention    []Patient is getting worse and would benefit from return to referring MD   []Patient unable to adhere to initial POC   []Other: pt stated he wanted to make today his last session as he was progressing well. Pt scored 1/96 per [facility] indicating hardly any dysfunction. Date range of Visits: 21-10/18/21  Total Visits: [address] [facility] and [place] [address].  [place] [address]  Phone: [phone]   Fax:     [phone]      Physical Therapy Treatment Note/ Progress Report:     Date:  10/18/2021    Patient Name:  Cheo Hanks    :  1950  MRN: [id number]    Pertinent Medical History:     Medical/Treatment Diagnosis Information:  · Diagnosis: Partial hamstring tear, right, initial encounter (X26.195U)  · Treatment Diagnosis: Decreased functional mobility    Insurance/Certification information:  PT Insurance Information: Medicare, Hum[employer] secondary  Physician Information:  Referring Practitioner: SULLY Kohli  Plan of care signed (Y/N): Y    Date of Patient follow up with Physician:      Progress Report: []  Yes  [x]  No     Date Range for reporting period:  Beginnin21  Ending:      Progress report due (10 Rx/or 30 days whichever is less):      Recertification due (POC duration/ or 90 days whichever is less):      Visit # POC/Insurance Allowable Auth Needed   6 12 []Yes   []No     Latex Allergy:  [x]NO      []YES  Preferred Language for Healthcare:   [x]English       []other:  Functional Scale: WOMAC = 9/29/21 23/96                                                    10/18/21  1/96  Pain level:  0-3/10     History of Injury:  Patient stated he was lifting 120 box from [address] a couple weeks ago. Pt was diagnosed with partially torn hamstring, R. Pt was having difficulty walking and walking up/down steps and driveway. Pt stated he feels like he is making improvements as he has less pain walking up steps. SUBJECTIVE:  See eval  10/4/21 groin was sore for 1 day following initial PT evaluation  10/18: May make today his last session. Doing well. Pt can climb up into his deer stand without any issues.   Pain is minimal to none    OBJECTIVE:    Observation:    21  Posture: R knee flexed, minimally decreased R weight shift  Observation: large bruise medial posterior distal R thigh  Squat: 3/4 range, pt attributed difficulty to knee problems  Heel walk (+) R  SLS R 1 sec, L 3 sec  Palpation: minimal tenderness R semimembranosis   Gait: (include devices/WB status) decreased R weight shift, decreased R knee terminal knee extension     Test measurements:      ROM 9/29/21  10/13/21   LE L and R WFL NT    Trunk flexion Decreased 50%     Flexibility hamstrings 90/90 test L -15  R -25    R -12        RESTRICTIONS/PRECAUTIONS:     Exercises/Interventions:     Therapeutic Ex (97099)   Min: 15 Reps/Resistance Notes/CUES        Stretch  Hamstrings  Gastroc incline   2x30\"  2x30\"    Bridge, heels on swiss ball     Knees extended     Knees flexed  Bridge with march 10x  10x  10x      Prone over swiss ball  Hip extension  Trunk extension   20x, 2\" hold  10x    Prone over table edge trunk extension  PT stabilized LEs             Manual Intervention (40653)  Min: 15     STM R semimembranosus 15'                             NMR re-education (54954)  Min: 15  CUES NEEDED   JOSEPH  Knee flexion  Knee extension Settings 8 and 9, 10x each    Concentric HS curl 10kg, 3x10 L    Standing t-slide  EXT   Orange, 30x L    Leg press 80# 20x    Prone hip extension 10x, 5\" hold    Prone hip IR/ER 3#, 30x    Prone HS curl 4#, 3x15    Therapeutic Activity (88630)  Min:                                   Modalities  Min:     IFC with      CP after exercises     MH after exercises            Other Therapeutic Activities: Pt was educated on PT POC, Diagnosis, Prognosis, pathomechanics as well as frequency and duration of scheduling future physical therapy appointments. Time was also taken on this day to answer all patient questions and participation in PT. Reviewed appointment policy in detail with patient and patient verbalized understanding. Home Exercise Program: Patient was instructed in the following for HEP:URL: [email or web address]  Date: 09/29/2021  Prepared by: Maria T Becerra     Exercises  Supine Bridge - 1 x daily - 7 x weekly - 10 reps - 5 hold  Supine Hip Adduction Isometric with Ball - 1 x daily - 7 x weekly - 10 reps - 10 hold  Supine Straight Leg Raises - 1 x daily - 7 x weekly - 2 sets - 10 reps  Straight Leg Raise with External Rotation - 1 x daily - 7 x weekly - 2 sets - 10 reps  Supine Isometric Hamstring Set - 1 x daily - 7 x weekly - 10 reps - 5-10 hold  Prone Hip Extension - Two Pillows - 1 x daily - 7 x weekly - 10 reps  Prone Knee Flexion AROM - 1 x daily - 7 x weekly - 20 reps  Standing Hamstring Stretch on Chair - 1 x daily - 7 x weekly - 2 reps - 30 hold  Standing Gastroc Stretch - 1 x daily - 7 x relaxation,  increasing ROM, reducing/eliminating soft tissue swelling/inflammation/restriction, improving soft tissue extensibility and allowing for proper ROM for normal function with self care, mobility, lifting and ambulation. Charges:  Timed Code Treatment Minutes: 45   Total Treatment Minutes: 45      [] EVAL (LOW) 65284 (typically 20 minutes face-to-face)  [] EVAL (MOD) 39378 (typically 30 minutes face-to-face)  [] EVAL (HIGH) 59025 (typically 45 minutes face-to-face)  [] RE-EVAL     [x] QP(20391) x     [] Dry needle 1 or 2 Muscles (82930)  [x] NMR (71268) x     [] Dry needle 3+ Muscles (40990)  [x] Manual (63293) x     [] Ultrasound (68170) x  [] TA (48756) x     [] Mech Traction (61596)  [] ES(attended) (22319)     [] ES (un) (33488):   [] Vasopump (60964) [] Ionto (06289)   [] Other:    GOALS:  Patient stated goal: \"use of right leg, climbing, etc.\"  []? Progressing: []? Met: []? Not Met: []? Adjusted     Therapist goals for Patient:   Short Term Goals: To be achieved in: 2 weeks  1. Independent in HEP and progression per patient tolerance, in order to prevent re-injury. []? Progressing: []? Met: []? Not Met: []? Adjusted  2. Patient will have a decrease in pain to facilitate improvement in movement, function, and ADLs as indicated by Functional Deficits. []? Progressing: []? Met: []? Not Met: []? Adjusted     Long Term Goals: To be achieved in: 6 weeks  1. Disability index score of 10% or less for the [facility] to assist with reaching prior level of function. []? Progressing: [x]? Met: []? Not Met: []? Adjusted  2. Patient will demonstrate increased hamstrings flexibility to allow for proper joint functioning as indicated by patients Functional Deficits. []? Progressing: [x]? Met: []? Not Met: []? Adjusted  3.  Patient will demonstrate an increase in Strength to good proximal hip strength and control, within 5lb HHD in LE to allow for proper functional mobility as indicated by patients Functional Deficits. []? Progressing: [x]? Met: []? Not Met: []? Adjusted  4. Patient will return to hunting deer without increased symptoms or restriction. []? Progressing: [x]? Met: []? Not Met: []? Adjusted  5. Patient will be able to climbing stairs and hills without pain. []? Progressing: [x]? Met: []? Not Met: []? Adjusted         ASSESSMENT:  See eval    Treatment/Activity Tolerance:  [x] Patient tolerated treatment well [] Patient limited by fatique  [] Patient limited by pain  [] Patient limited by other medical complications  [] Other:     Overall Progression Towards Functional goals/ Treatment Progress Update:  [] Patient is progressing as expected towards functional goals listed. [] Progression is slowed due to complexities/Impairments listed. [] Progression has been slowed due to co-morbidities. [x] Plan just implemented, too soon to assess goals progression <30days   [] Goals require adjustment due to lack of progress  [] Patient is not progressing as expected and requires additional follow up with physician  [] Other    Prognosis for POC: [x] Good [] Fair  [] Poor    Patient requires continued skilled intervention: [] Yes  [x] No        PLAN:   [] Continue per plan of care [] Alter current plan (see comments)  [] Plan of care initiated [] Hold pending MD visit [x] Discharge    Electronically signed by: Howie Harris PTA       Note: If patient does not return for scheduled/recommended follow up visits, this note will serve as a discharge from care along with the most recent update on progress.

## 2021-10-20 ENCOUNTER — APPOINTMENT (OUTPATIENT)
Dept: PHYSICAL THERAPY | Age: 71
End: 2021-10-20
Payer: MEDICARE

## 2021-10-25 ENCOUNTER — APPOINTMENT (OUTPATIENT)
Dept: PHYSICAL THERAPY | Age: 71
End: 2021-10-25
Payer: MEDICARE

## 2021-10-27 ENCOUNTER — APPOINTMENT (OUTPATIENT)
Dept: PHYSICAL THERAPY | Age: 71
End: 2021-10-27
Payer: MEDICARE

## 2021-12-03 ENCOUNTER — TELEPHONE (OUTPATIENT)
Dept: FAMILY MEDICINE CLINIC | Age: 71
End: 2021-12-03

## 2021-12-03 DIAGNOSIS — J40 BRONCHITIS: Primary | ICD-10-CM

## 2021-12-03 RX ORDER — AZITHROMYCIN 250 MG/1
250 TABLET, FILM COATED ORAL SEE ADMIN INSTRUCTIONS
Qty: 6 TABLET | Refills: 0 | Status: SHIPPED | OUTPATIENT
Start: 2021-12-03 | End: 2021-12-08

## 2021-12-03 NOTE — TELEPHONE ENCOUNTER
Spoke to pt, he stated symptoms started on Monday chest congestion, cough, not feeling well. Went Wednesday to [facility] for [facility] test result was negative. Still coughing a lot, productive clear to yellow in color, somewhat hard to cough up. He stated  all in his chest with some wheezing when laying down. He has tried Mucinex and Flonase.      [facility]  [facility]    He wanted to see if Dr Bhavesh Gonzalez would call something in for him, he is afraid that it might get worse over the weekend

## 2021-12-03 NOTE — TELEPHONE ENCOUNTER
Spoke with patients wife. She states she already spoke to someone from the office. Patients wife did ask to verify what pharmacy medication was sent to. Informed her of information.

## 2021-12-03 NOTE — TELEPHONE ENCOUNTER
Let the patient know an antibiotic was called in. If not improved please make appointment next week.

## 2021-12-03 NOTE — TELEPHONE ENCOUNTER
Please discuss with patient about symptoms. If needed virtual visit or e visit to give us more information. Discuss symptoms and what he has tried.

## 2021-12-04 ENCOUNTER — APPOINTMENT (OUTPATIENT)
Dept: GENERAL RADIOLOGY | Age: 71
End: 2021-12-04
Payer: MEDICARE

## 2021-12-04 ENCOUNTER — HOSPITAL ENCOUNTER (EMERGENCY)
Age: 71
Discharge: HOME | End: 2021-12-04
Attending: EMERGENCY MEDICINE
Payer: MEDICARE

## 2021-12-04 VITALS
BODY MASS INDEX: 26.63 KG/M2 | DIASTOLIC BLOOD PRESSURE: 84 MMHG | SYSTOLIC BLOOD PRESSURE: 138 MMHG | TEMPERATURE: 98.4 F | HEIGHT: 76 IN | WEIGHT: 218.7 LBS | HEART RATE: 72 BPM | RESPIRATION RATE: 20 BRPM | OXYGEN SATURATION: 95 %

## 2021-12-04 DIAGNOSIS — J06.9 ACUTE UPPER RESPIRATORY INFECTION: Primary | ICD-10-CM

## 2021-12-04 PROCEDURE — 71046 X-RAY EXAM CHEST 2 VIEWS: CPT

## 2021-12-04 PROCEDURE — 6370000000 HC RX 637 (ALT 250 FOR IP): Performed by: EMERGENCY MEDICINE

## 2021-12-04 PROCEDURE — 99284 EMERGENCY DEPT VISIT MOD MDM: CPT

## 2021-12-04 RX ORDER — BENZONATATE 100 MG/1
100 CAPSULE ORAL ONCE
Status: COMPLETED | OUTPATIENT
Start: 2021-12-04 | End: 2021-12-04

## 2021-12-04 RX ORDER — ALBUTEROL SULFATE 90 UG/1
2 AEROSOL, METERED RESPIRATORY (INHALATION) 4 TIMES DAILY PRN
Qty: 18 G | Refills: 0 | Status: SHIPPED | OUTPATIENT
Start: 2021-12-04

## 2021-12-04 RX ORDER — BENZONATATE 100 MG/1
100-200 CAPSULE ORAL 3 TIMES DAILY PRN
Qty: 30 CAPSULE | Refills: 0 | Status: SHIPPED | OUTPATIENT
Start: 2021-12-04 | End: 2021-12-06 | Stop reason: SDUPTHER

## 2021-12-04 RX ORDER — PSEUDOEPHEDRINE HCL 120 MG/1
120 TABLET, FILM COATED, EXTENDED RELEASE ORAL EVERY EVENING
Qty: 5 TABLET | Refills: 0 | Status: SHIPPED | OUTPATIENT
Start: 2021-12-04 | End: 2021-12-09

## 2021-12-04 RX ADMIN — BENZONATATE 100 MG: 100 CAPSULE ORAL at 09:44

## 2021-12-04 ASSESSMENT — ENCOUNTER SYMPTOMS
VOMITING: 0
NAUSEA: 0
SINUS PRESSURE: 1
DIARRHEA: 0
EYE DISCHARGE: 0
SHORTNESS OF BREATH: 0
ABDOMINAL PAIN: 0
SORE THROAT: 1
COUGH: 1

## 2021-12-04 NOTE — ED PROVIDER NOTES
Emergency Department Provider Note  Location: [facility]  12/4/2021     Patient Identification  Wendy Obrien is a 66 y.o. male    Chief Complaint  Cough (Developed a cough on 11/28/21. Was seen at [facility] on wednesday. Tested negative for covid. Having sinus drainage and scratchy throat when he coughs. Occasional low grade temp. No vomiting or diarrhea. Has been taking mucinex and using Flonase. Started on Z pack yesterday. Wanting a CXR. Has been vaccinated for Covid and recently got a flu shot.)      Mode of Arrival  private car    HPI  (History provided by patient)  This is a 66 y.o. male with a PMH significant for HTN, HLD presented today for cough that started 11/28/21. He was tested negative for COVID on 12/1/21. He was prescribed Z-katharina which he started yesterday. He is taking mucinex and flonase but had minimal relief of cough. He states he has bilateral rib pain when he coughs. Otherwise, he has no pain at rest.  He reports a lot of sinus congestion especially at night, making it difficult for him to sleep. He reports occasional low-grade temperature. No documented fever. No body ache. No nausea vomiting or diarrhea. He has been monitoring his O2 sat at home and the lowest it got was 95%. Patient is vaccinated for COVID and also recently got a flu shot. ROS  Review of Systems   Constitutional: Negative for chills and fever. HENT: Positive for congestion, postnasal drip, sinus pressure and sore throat. Negative for ear pain. Eyes: Negative for discharge. Respiratory: Positive for cough. Negative for shortness of breath. Cardiovascular: Positive for chest pain (bilateral rib pain when he coughs but otherwise no pain). Gastrointestinal: Negative for abdominal pain, diarrhea, nausea and vomiting. Musculoskeletal: Negative for myalgias and neck stiffness. Skin: Negative for rash. Neurological: Negative for headaches.        I have reviewed the following nursing documentation:  Allergies: No Known Allergies    Past medical history:  has a past medical history of Atrial fibrillation ([address] Detached retina (noc 2010), Hyperlipidemia, Hypertension, and Sleep apnea. Past surgical history:  has a past surgical history that includes hernia repair; Cataract removal (11/2011); Retinal detachment surgery (nov 2010); Colonoscopy (2009); Cardiac catheterization (jan 2015); Atrial ablation surgery (112/2016); and Shoulder arthroscopy (Right, 3/8/2019). Home medications:   Prior to Admission medications    Medication Sig Start Date End Date Taking? Authorizing Provider   azithromycin (ZITHROMAX) 250 MG tablet Take 1 tablet by mouth See Admin Instructions for 5 days 500mg on day 1 followed by 250mg on days 2 - 5 12/3/21 12/8/21 Yes Isaiah Brown, DO   simvastatin (ZOCOR) 40 MG tablet TAKE 1 TABLET BY MOUTH EVERY DAY IN THE EVENING 9/30/21  Yes Isaiah Brown, DO   Multiple Vitamins-Minerals (CENTRUM SILVER PO) Take 1 tablet by mouth daily   Yes Historical Provider, MD   loratadine (CLARITIN) 10 MG tablet TAKE 1 TABLET BY MOUTH EVERY DAY 9/29/21   Jhoan Muhammad, DO       Social history:  reports that he has never smoked. He uses smokeless tobacco. He reports current alcohol use. He reports that he does not use drugs. Family history:  History reviewed. No pertinent family history. Exam  ED Triage Vitals [12/04/21 0927]   BP Temp Temp src Pulse Resp SpO2 Height Weight   138/84 98.4 °F (36.9 °C) -- 72 20 95 % 6' 4\" (1.93 m) 218 lb 11.1 oz (99.2 kg)   Nursing note and vital signs reviewed  Constitutional: Patient is oriented to person, place, and time. WDWN. No distress. Nontoxic. HENT:      Head: Normocephalic and atraumatic. Ears: External ears normal. TM normal bilaterally. Nose: Nose normal.     Mouth: Membrane mucosa moist and pink. Uvula midline. Soft palate symmetrical.  No evidence of PTA. No tonsillar exudate. No trismus. No submandibular fullness or tongue elevation. Eyes: Anicteric sclera. PERRL. EOMI. No discharge. Neck: Supple. Trachea midline. Good ROM. No meningismus signs. No mass. Lymph: No cervical adenopathy  Cardiovascular: RRR, no g/r/m. 2+ radial pulses. Pulmonary/Chest: Effort normal. No respiratory distress. CTAB. No stridor. No wheezes. No rales. Musculoskeletal: No extremity edema. Compartments soft. No deformity. Neurological: Alert and oriented to person, place, and time. No facial droop. No slurred speech. Normal gait. Skin: Warm and dry. No rash. No petechiae. MDM/ED Course  ED Medication Orders (From admission, onward)    Start Ordered     Status Ordering Provider    12/04/21 0945 12/04/21 0942  benzonatate (TESSALON) capsule 100 mg  ONCE         Last MAR action: Given - by Fermin Moncada on 12/04/21 at 0944 [facility]           Radiology  XR CHEST (2 VW)    Result Date: 12/4/2021  EXAMINATION: TWO XRAY VIEWS OF THE CHEST 12/4/2021 9:47 am COMPARISON: 06/03/2021 HISTORY: ORDERING SYSTEM PROVIDED HISTORY: cough TECHNOLOGIST PROVIDED HISTORY: Reason for exam:->cough Reason for Exam: cough and chest congestion with low grade fever x 1 week-negative for covid Acuity: Acute Type of Exam: Initial FINDINGS: The lungs are without acute focal process. There is no effusion or pneumothorax. The cardiomediastinal silhouette is stable. The osseous structures are stable. No acute process. - Patient seen and evaluated in room 3.  66 y.o. male presented for acute URI symptoms. He was already tested negative for Covid. Overall, patient is well-appearing. His vital sign was normal.  He is not hypoxic. He is in no respiratory distress. - Pertinent old records reviewed, including outside lab and I confirmed the patient had a negative COVID test on 12/1/21.  - CXR did not show acute cardiopulmonary disease. I discussed the results with patient.   He felt comfortable going home and continue his treatment as acute URI. He already started Z-Armaan yesterday and took 2 doses. I advised him to finish the Z-Armaan since he already started it. We will prescribe additional medication for symptomatic relief. - Return precautions also discussed. patient verbalized understanding of care plan and agreed to follow-up with PCP as advised. I estimate there is LOW risk for ACUTE RESPIRATORY FAILURE, EPIGLOTTITIS, PNEUMONIA, OR MENINGITIS thus I consider the discharge disposition reasonable. Also, there is no evidence or peritonitis, sepsis, or toxicity. Mohsen July and I have discussed the diagnosis and risks, and we agree with discharging home to follow-up with PCP. We also discussed returning to the Emergency Department immediately if new or worsening symptoms occur. We have discussed the symptoms which are most concerning (e.g., changing or worsening pain, trouble swallowing or breathing, neck stiffness, fever) that necessitate immediate return. Clinical Impression:  1. Acute upper respiratory infection          Disposition:  Discharge to home in good condition. Blood pressure 138/84, pulse 72, temperature 98.4 °F (36.9 °C), resp. rate 20, height 6' 4\" (1.93 m), weight 218 lb 11.1 oz (99.2 kg), SpO2 95 %. Patient was given scripts for the following medications. I counseled patient how to take these medications. New Prescriptions    ALBUTEROL SULFATE HFA (VENTOLIN HFA) 108 (90 BASE) MCG/ACT INHALER    Inhale 2 puffs into the lungs 4 times daily as needed for Wheezing or Shortness of Breath    BENZONATATE (TESSALON) 100 MG CAPSULE    Take 1-2 capsules by mouth 3 times daily as needed for Cough    PSEUDOEPHEDRINE (SUDAFED 12 HR) 120 MG EXTENDED RELEASE TABLET    Take 1 tablet by mouth every evening for 5 days decongestant       Disposition referral (if applicable):   [facility] [address] [place] [address]  [phone]    Schedule an appointment as soon as possible for a visit in 3 days           This chart was generated in part by using Dragon Dictation system and may contain errors related to that system including errors in grammar, punctuation, and spelling, as well as words and phrases that may be inappropriate. If there are any questions or concerns please feel free to contact the dictating provider for clarification.      Luciano Arce MD  [facility] Kaela Vincent MD  12/04/21 1805

## 2021-12-06 ENCOUNTER — TELEPHONE (OUTPATIENT)
Dept: FAMILY MEDICINE CLINIC | Age: 71
End: 2021-12-06

## 2021-12-06 RX ORDER — BENZONATATE 100 MG/1
100-200 CAPSULE ORAL 3 TIMES DAILY PRN
Qty: 30 CAPSULE | Refills: 0 | Status: SHIPPED | OUTPATIENT
Start: 2021-12-06 | End: 2021-12-13

## 2021-12-06 NOTE — TELEPHONE ENCOUNTER
----- Message from Alma Millan sent at 12/6/2021 10:22 AM EST -----  Subject: Message to Provider    QUESTIONS  Information for Provider? PT says he is feeling better but still has a   deep cough and is on his last pill  ---------------------------------------------------------------------------  --------------  CALL BACK INFO  What is the best way for the office to contact you? OK to leave message on   voicemail  Preferred Call Back Phone Number? [phone]  ---------------------------------------------------------------------------  --------------  SCRIPT ANSWERS  Relationship to Patient?  Self

## 2022-03-31 DIAGNOSIS — E78.2 MIXED HYPERLIPIDEMIA: ICD-10-CM

## 2022-03-31 RX ORDER — SIMVASTATIN 40 MG
TABLET ORAL
Qty: 90 TABLET | Refills: 1 | Status: SHIPPED | OUTPATIENT
Start: 2022-03-31 | End: 2022-10-17

## 2022-03-31 NOTE — TELEPHONE ENCOUNTER
Last office visit 6/3/2021     Last written     Next office visit scheduled Visit date not found    Requested Prescriptions     Pending Prescriptions Disp Refills    simvastatin (ZOCOR) 40 MG tablet [Pharmacy Med Name: SIMVASTATIN 40 MG TABLET] 90 tablet 1     Sig: TAKE 1 TABLET BY MOUTH EVERY DAY IN THE EVENING

## 2022-10-01 DIAGNOSIS — E78.2 MIXED HYPERLIPIDEMIA: ICD-10-CM

## 2022-10-03 ENCOUNTER — TELEMEDICINE (OUTPATIENT)
Dept: FAMILY MEDICINE CLINIC | Age: 72
End: 2022-10-03
Payer: MEDICARE

## 2022-10-03 DIAGNOSIS — I10 ESSENTIAL HYPERTENSION: ICD-10-CM

## 2022-10-03 DIAGNOSIS — Z00.00 MEDICARE ANNUAL WELLNESS VISIT, SUBSEQUENT: Primary | ICD-10-CM

## 2022-10-03 PROCEDURE — 3017F COLORECTAL CA SCREEN DOC REV: CPT | Performed by: FAMILY MEDICINE

## 2022-10-03 PROCEDURE — G0439 PPPS, SUBSEQ VISIT: HCPCS | Performed by: FAMILY MEDICINE

## 2022-10-03 PROCEDURE — G8484 FLU IMMUNIZE NO ADMIN: HCPCS | Performed by: FAMILY MEDICINE

## 2022-10-03 PROCEDURE — 1123F ACP DISCUSS/DSCN MKR DOCD: CPT | Performed by: FAMILY MEDICINE

## 2022-10-03 RX ORDER — SIMVASTATIN 40 MG
TABLET ORAL
Qty: 90 TABLET | Refills: 1 | OUTPATIENT
Start: 2022-10-03

## 2022-10-03 ASSESSMENT — PATIENT HEALTH QUESTIONNAIRE - PHQ9
2. FEELING DOWN, DEPRESSED OR HOPELESS: 0
SUM OF ALL RESPONSES TO PHQ QUESTIONS 1-9: 0
1. LITTLE INTEREST OR PLEASURE IN DOING THINGS: 0
SUM OF ALL RESPONSES TO PHQ9 QUESTIONS 1 & 2: 0
SUM OF ALL RESPONSES TO PHQ QUESTIONS 1-9: 0

## 2022-10-03 ASSESSMENT — LIFESTYLE VARIABLES
HOW OFTEN DO YOU HAVE A DRINK CONTAINING ALCOHOL: NEVER
HOW MANY STANDARD DRINKS CONTAINING ALCOHOL DO YOU HAVE ON A TYPICAL DAY: PATIENT DOES NOT DRINK

## 2022-10-03 NOTE — PROGRESS NOTES
Medicare Annual Wellness Visit    Salo Awan is here for No chief complaint on file. Assessment & Plan    Recommendations for Preventive Services Due: see orders and patient instructions/AVS.  Recommended screening schedule for the next 5-10 years is provided to the patient in written form: see Patient Instructions/AVS.     No follow-ups on file. Subjective   The following acute and/or chronic problems were also addressed today:  MEDICATION REFILL    Patient's complete Health Risk Assessment and screening values have been reviewed and are found in Flowsheets. The following problems were reviewed today and where indicated follow up appointments were made and/or referrals ordered.     Positive Risk Factor Screenings with Interventions:       Tobacco Use:  Tobacco Use: High Risk    Smoking Tobacco Use: Never    Smokeless Tobacco Use: Current     E-cigarette/Vaping       Questions Responses    E-cigarette/Vaping Use     Start Date     Passive Exposure     Quit Date     Counseling Given     Comments           Substance Use - Tobacco Interventions:  patient is not ready to work toward tobacco cessation at this time         General Health and ACP:  General  In general, how would you say your health is?: Good  In the past 7 days, have you experienced any of the following: New or Increased Pain, New or Increased Fatigue, Loneliness, Social Isolation, Stress or Anger?: No  Do you get the social and emotional support that you need?: Yes  Do you have a Living Will?: Yes    Advance Directives       Power of  Living Will ACP-Advance Directive ACP-Power of     Not on File Not on File Not on File Not on File        General Health Risk Interventions:  No problems      Safety:  Do you have working smoke detectors?: Yes  Do you have any tripping hazards - loose or unsecured carpets or rugs?: No  Do you have any tripping hazards - clutter in doorways, halls, or stairs?: No  Do you have either shower bars, grab bars, non-slip mats or non-slip surfaces in your shower or bathtub?: (!) No  Do all of your stairways have a railing or banister?: Yes  Do you always fasten your seatbelt when you are in a car?: Yes  Safety Interventions:  Patient declines any further evaluation/treatment for this issue           Objective      Patient-Reported Vitals  No data recorded   No PE       No Known Allergies  Prior to Visit Medications    Medication Sig Taking? Authorizing Provider   simvastatin (ZOCOR) 40 MG tablet TAKE 1 TABLET BY MOUTH EVERY DAY IN THE EVENING Yes Isiaah Brown DO   albuterol sulfate HFA (VENTOLIN HFA) 108 (90 Base) MCG/ACT inhaler Inhale 2 puffs into the lungs 4 times daily as needed for Wheezing or Shortness of Breath Yes North Hernandez MD   loratadine (CLARITIN) 10 MG tablet TAKE 1 TABLET BY MOUTH EVERY DAY Yes Isaiah Brown DO   Multiple Vitamins-Minerals (CENTRUM SILVER PO) Take 1 tablet by mouth daily Yes Historical Provider, MD Rodriguez (Including outside providers/suppliers regularly involved in providing care):   Patient Care Team:  Christina Armendariz DO as PCP - General (Family Medicine)  Christina Armendariz DO as PCP - [facility] Empaneled Provider     Reviewed and updated this visit:  Allergies  Vinita Santos, was evaluated through a synchronous (real-time) audio-video encounter. The patient (or guardian if applicable) is aware that this is a billable service, which includes applicable co-pays. This Virtual Visit was conducted with patient's (and/or legal guardian's) consent. The visit was conducted pursuant to the emergency declaration under the [address] [facility], [address] [place] [address] authority and the [employer] General Act. Patient identification was verified, and a caregiver was present when appropriate. The patient was located at Home: [place] [address].    Provider was located at Home ([address] [place] [address] State): [place].

## 2022-10-03 NOTE — PATIENT INSTRUCTIONS
Personalized Preventive Plan for Madalyn Ko - 10/3/2022  Medicare offers a range of preventive health benefits. Some of the tests and screenings are paid in full while other may be subject to a deductible, co-insurance, and/or copay. Some of these benefits include a comprehensive review of your medical history including lifestyle, illnesses that may run in your family, and various assessments and screenings as appropriate. After reviewing your medical record and screening and assessments performed today your provider may have ordered immunizations, labs, imaging, and/or referrals for you. A list of these orders (if applicable) as well as your Preventive Care list are included within your After Visit Summary for your review. Other Preventive Recommendations:    A preventive eye exam performed by an eye specialist is recommended every 1-2 years to screen for glaucoma; cataracts, macular degeneration, and other eye disorders. A preventive dental visit is recommended every 6 months. Try to get at least 150 minutes of exercise per week or 10,000 steps per day on a pedometer . Order or download the FREE \"Exercise & Physical Activity: Your Everyday Guide\" from [employer]. Call 5-397.211.6948 or search [employer] online. You need 1540-8552 mg of calcium and 0523-9655 IU of vitamin D per day. It is possible to meet your calcium requirement with diet alone, but a vitamin D supplement is usually necessary to meet this goal.  When exposed to the sun, use a sunscreen that protects against both UVA and UVB radiation with an SPF of 30 or greater. Reapply every 2 to 3 hours or after sweating, drying off with a towel, or swimming. Always wear a seat belt when traveling in a car. Always wear a helmet when riding a bicycle or motorcycle.

## 2022-10-03 NOTE — TELEPHONE ENCOUNTER
Last office visit 6/3/2021     Last written     Next office visit scheduled Visit date not found    Requested Prescriptions     Pending Prescriptions Disp Refills    simvastatin (ZOCOR) 40 MG tablet [Pharmacy Med Name: SIMVASTATIN 40 MG TABLET] 90 tablet 1     Sig: TAKE 1 TABLET BY MOUTH EVERY DAY IN THE EVENING

## 2022-10-11 ENCOUNTER — NURSE ONLY (OUTPATIENT)
Dept: FAMILY MEDICINE CLINIC | Age: 72
End: 2022-10-11
Payer: MEDICARE

## 2022-10-11 DIAGNOSIS — I10 ESSENTIAL HYPERTENSION: ICD-10-CM

## 2022-10-11 LAB
A/G RATIO: 2.1 (ref 1.1–2.2)
ALBUMIN SERPL-MCNC: 5 G/DL (ref 3.4–5)
ALP BLD-CCNC: 111 U/L (ref 40–129)
ALT SERPL-CCNC: 28 U/L (ref 10–40)
ANION GAP SERPL CALCULATED.3IONS-SCNC: 16 MMOL/L (ref 3–16)
AST SERPL-CCNC: 36 U/L (ref 15–37)
BILIRUB SERPL-MCNC: 0.7 MG/DL (ref 0–1)
BUN BLDV-MCNC: 12 MG/DL (ref 7–20)
CALCIUM SERPL-MCNC: 9.5 MG/DL (ref 8.3–10.6)
CHLORIDE BLD-SCNC: 103 MMOL/L (ref 99–110)
CO2: 23 MMOL/L (ref 21–32)
CREAT SERPL-MCNC: 0.9 MG/DL (ref 0.8–1.3)
GFR AFRICAN AMERICAN: >60
GFR NON-AFRICAN AMERICAN: >60
GLUCOSE BLD-MCNC: 87 MG/DL (ref 70–99)
HCT VFR BLD CALC: 48.6 % (ref 40.5–52.5)
HEMOGLOBIN: 16.6 G/DL (ref 13.5–17.5)
MCH RBC QN AUTO: 31.6 PG (ref 26–34)
MCHC RBC AUTO-ENTMCNC: 34.1 G/DL (ref 31–36)
MCV RBC AUTO: 92.9 FL (ref 80–100)
PDW BLD-RTO: 13.9 % (ref 12.4–15.4)
PLATELET # BLD: 93 K/UL (ref 135–450)
PLATELET SLIDE REVIEW: ABNORMAL
PMV BLD AUTO: 10.9 FL (ref 5–10.5)
POTASSIUM SERPL-SCNC: 4.6 MMOL/L (ref 3.5–5.1)
RBC # BLD: 5.24 M/UL (ref 4.2–5.9)
SLIDE REVIEW: ABNORMAL
SODIUM BLD-SCNC: 142 MMOL/L (ref 136–145)
TOTAL PROTEIN: 7.4 G/DL (ref 6.4–8.2)
WBC # BLD: 4.9 K/UL (ref 4–11)

## 2022-10-11 PROCEDURE — 36415 COLL VENOUS BLD VENIPUNCTURE: CPT | Performed by: FAMILY MEDICINE

## 2022-10-12 DIAGNOSIS — E78.2 MIXED HYPERLIPIDEMIA: ICD-10-CM

## 2022-10-12 LAB
CHOLESTEROL, TOTAL: 184 MG/DL (ref 0–199)
HDLC SERPL-MCNC: 63 MG/DL (ref 40–60)
LDL CHOLESTEROL CALCULATED: 102 MG/DL
PROSTATE SPECIFIC ANTIGEN: 2.03 NG/ML (ref 0–4)
TRIGL SERPL-MCNC: 95 MG/DL (ref 0–150)
VLDLC SERPL CALC-MCNC: 19 MG/DL

## 2022-10-17 DIAGNOSIS — E78.2 MIXED HYPERLIPIDEMIA: ICD-10-CM

## 2022-10-17 RX ORDER — SIMVASTATIN 40 MG
TABLET ORAL
Qty: 90 TABLET | Refills: 1 | Status: SHIPPED | OUTPATIENT
Start: 2022-10-17

## 2022-10-19 DIAGNOSIS — E78.2 MIXED HYPERLIPIDEMIA: ICD-10-CM

## 2022-10-19 RX ORDER — SIMVASTATIN 40 MG
TABLET ORAL
Qty: 90 TABLET | Refills: 1 | OUTPATIENT
Start: 2022-10-19

## 2022-10-19 NOTE — TELEPHONE ENCOUNTER
Last office visit 10/3/2022     Last written      Next office visit scheduled Visit date not found    Requested Prescriptions     Pending Prescriptions Disp Refills    simvastatin (ZOCOR) 40 MG tablet 90 tablet 1

## 2023-01-17 DIAGNOSIS — E78.2 MIXED HYPERLIPIDEMIA: ICD-10-CM

## 2023-01-17 RX ORDER — SIMVASTATIN 40 MG
TABLET ORAL
Qty: 90 TABLET | Refills: 1 | OUTPATIENT
Start: 2023-01-17

## 2023-01-17 RX ORDER — SIMVASTATIN 40 MG
TABLET ORAL
Qty: 90 TABLET | Refills: 1 | Status: SHIPPED | OUTPATIENT
Start: 2023-01-17

## 2023-01-17 NOTE — TELEPHONE ENCOUNTER
Last office visit 10/3/2022     Last written      Next office visit scheduled Visit date not found    Requested Prescriptions     Pending Prescriptions Disp Refills    simvastatin (ZOCOR) 40 MG tablet [Pharmacy Med Name: SIMVASTATIN 40 MG TABLET] 90 tablet 1     Sig: TAKE 1 TABLET BY MOUTH EVERY DAY IN THE EVENING

## 2023-09-12 RX ORDER — SILDENAFIL 50 MG/1
50 TABLET, FILM COATED ORAL DAILY PRN
Qty: 10 TABLET | Refills: 0 | Status: SHIPPED | OUTPATIENT
Start: 2023-09-12

## 2023-11-24 DIAGNOSIS — E78.2 MIXED HYPERLIPIDEMIA: ICD-10-CM

## 2023-11-27 RX ORDER — SIMVASTATIN 40 MG
TABLET ORAL
Qty: 30 TABLET | Refills: 0 | Status: SHIPPED | OUTPATIENT
Start: 2023-11-27

## 2023-11-27 RX ORDER — SILDENAFIL 50 MG/1
50 TABLET, FILM COATED ORAL DAILY PRN
Qty: 10 TABLET | Refills: 0 | Status: SHIPPED | OUTPATIENT
Start: 2023-11-27

## 2023-11-27 NOTE — TELEPHONE ENCOUNTER
Last office visit 10/3/2022     Last written      Next office visit scheduled Visit date not found    Requested Prescriptions     Pending Prescriptions Disp Refills    sildenafil (VIAGRA) 50 MG tablet [Pharmacy Med Name: SILDENAFIL 50 MG TABLET] 10 tablet 0     Sig: TAKE 1 TABLET BY MOUTH DAILY AS NEEDED FOR ERECTILE DYSFUNCTION    simvastatin (ZOCOR) 40 MG tablet [Pharmacy Med Name: SIMVASTATIN 40 MG TABLET] 30 tablet 0     Sig: TAKE ONE TABLET BY MOUTH EVERY EVENING

## 2023-12-06 SDOH — HEALTH STABILITY: PHYSICAL HEALTH: ON AVERAGE, HOW MANY DAYS PER WEEK DO YOU ENGAGE IN MODERATE TO STRENUOUS EXERCISE (LIKE A BRISK WALK)?: 4 DAYS

## 2023-12-06 SDOH — HEALTH STABILITY: PHYSICAL HEALTH: ON AVERAGE, HOW MANY MINUTES DO YOU ENGAGE IN EXERCISE AT THIS LEVEL?: 30 MIN

## 2023-12-06 ASSESSMENT — LIFESTYLE VARIABLES
HOW OFTEN DURING THE LAST YEAR HAVE YOU FAILED TO DO WHAT WAS NORMALLY EXPECTED FROM YOU BECAUSE OF DRINKING: 0
HOW OFTEN DO YOU HAVE SIX OR MORE DRINKS ON ONE OCCASION: 2
HOW MANY STANDARD DRINKS CONTAINING ALCOHOL DO YOU HAVE ON A TYPICAL DAY: 2
HOW OFTEN DURING THE LAST YEAR HAVE YOU FOUND THAT YOU WERE NOT ABLE TO STOP DRINKING ONCE YOU HAD STARTED: NEVER
HOW OFTEN DURING THE LAST YEAR HAVE YOU HAD A FEELING OF GUILT OR REMORSE AFTER DRINKING: 0
HOW OFTEN DURING THE LAST YEAR HAVE YOU NEEDED AN ALCOHOLIC DRINK FIRST THING IN THE MORNING TO GET YOURSELF GOING AFTER A NIGHT OF HEAVY DRINKING: 0
HAS A RELATIVE, FRIEND, DOCTOR, OR ANOTHER HEALTH PROFESSIONAL EXPRESSED CONCERN ABOUT YOUR DRINKING OR SUGGESTED YOU CUT DOWN: NO
HOW MANY STANDARD DRINKS CONTAINING ALCOHOL DO YOU HAVE ON A TYPICAL DAY: 3 OR 4
HAVE YOU OR SOMEONE ELSE BEEN INJURED AS A RESULT OF YOUR DRINKING: 0
HOW OFTEN DURING THE LAST YEAR HAVE YOU BEEN UNABLE TO REMEMBER WHAT HAPPENED THE NIGHT BEFORE BECAUSE YOU HAD BEEN DRINKING: NEVER
HOW OFTEN DURING THE LAST YEAR HAVE YOU FOUND THAT YOU WERE NOT ABLE TO STOP DRINKING ONCE YOU HAD STARTED: 0
HAVE YOU OR SOMEONE ELSE BEEN INJURED AS A RESULT OF YOUR DRINKING: NO
HAS A RELATIVE, FRIEND, DOCTOR, OR ANOTHER HEALTH PROFESSIONAL EXPRESSED CONCERN ABOUT YOUR DRINKING OR SUGGESTED YOU CUT DOWN: 0
HOW OFTEN DO YOU HAVE A DRINK CONTAINING ALCOHOL: 5
HOW OFTEN DO YOU HAVE A DRINK CONTAINING ALCOHOL: 4 OR MORE TIMES A WEEK
HOW OFTEN DURING THE LAST YEAR HAVE YOU BEEN UNABLE TO REMEMBER WHAT HAPPENED THE NIGHT BEFORE BECAUSE YOU HAD BEEN DRINKING: 0
HOW OFTEN DURING THE LAST YEAR HAVE YOU NEEDED AN ALCOHOLIC DRINK FIRST THING IN THE MORNING TO GET YOURSELF GOING AFTER A NIGHT OF HEAVY DRINKING: NEVER
HOW OFTEN DURING THE LAST YEAR HAVE YOU HAD A FEELING OF GUILT OR REMORSE AFTER DRINKING: NEVER
HOW OFTEN DURING THE LAST YEAR HAVE YOU FAILED TO DO WHAT WAS NORMALLY EXPECTED FROM YOU BECAUSE OF DRINKING: NEVER

## 2023-12-06 ASSESSMENT — PATIENT HEALTH QUESTIONNAIRE - PHQ9
SUM OF ALL RESPONSES TO PHQ9 QUESTIONS 1 & 2: 0
SUM OF ALL RESPONSES TO PHQ QUESTIONS 1-9: 0
SUM OF ALL RESPONSES TO PHQ QUESTIONS 1-9: 0
1. LITTLE INTEREST OR PLEASURE IN DOING THINGS: 0
SUM OF ALL RESPONSES TO PHQ QUESTIONS 1-9: 0
2. FEELING DOWN, DEPRESSED OR HOPELESS: 0
SUM OF ALL RESPONSES TO PHQ QUESTIONS 1-9: 0

## 2023-12-12 SDOH — ECONOMIC STABILITY: TRANSPORTATION INSECURITY
IN THE PAST 12 MONTHS, HAS LACK OF TRANSPORTATION KEPT YOU FROM MEETINGS, WORK, OR FROM GETTING THINGS NEEDED FOR DAILY LIVING?: NO

## 2023-12-12 SDOH — ECONOMIC STABILITY: INCOME INSECURITY: HOW HARD IS IT FOR YOU TO PAY FOR THE VERY BASICS LIKE FOOD, HOUSING, MEDICAL CARE, AND HEATING?: NOT HARD AT ALL

## 2023-12-12 SDOH — ECONOMIC STABILITY: FOOD INSECURITY: WITHIN THE PAST 12 MONTHS, YOU WORRIED THAT YOUR FOOD WOULD RUN OUT BEFORE YOU GOT MONEY TO BUY MORE.: NEVER TRUE

## 2023-12-12 SDOH — ECONOMIC STABILITY: HOUSING INSECURITY
IN THE LAST 12 MONTHS, WAS THERE A TIME WHEN YOU DID NOT HAVE A STEADY PLACE TO SLEEP OR SLEPT IN A SHELTER (INCLUDING NOW)?: NO

## 2023-12-12 SDOH — ECONOMIC STABILITY: FOOD INSECURITY: WITHIN THE PAST 12 MONTHS, THE FOOD YOU BOUGHT JUST DIDN'T LAST AND YOU DIDN'T HAVE MONEY TO GET MORE.: NEVER TRUE

## 2023-12-13 ENCOUNTER — OFFICE VISIT (OUTPATIENT)
Dept: FAMILY MEDICINE CLINIC | Age: 73
End: 2023-12-13
Payer: MEDICARE

## 2023-12-13 VITALS
DIASTOLIC BLOOD PRESSURE: 82 MMHG | WEIGHT: 217 LBS | BODY MASS INDEX: 26.42 KG/M2 | HEIGHT: 76 IN | SYSTOLIC BLOOD PRESSURE: 138 MMHG

## 2023-12-13 DIAGNOSIS — D69.6 THROMBOCYTOPENIA, UNSPECIFIED (HCC): ICD-10-CM

## 2023-12-13 DIAGNOSIS — Z12.5 SCREENING PSA (PROSTATE SPECIFIC ANTIGEN): ICD-10-CM

## 2023-12-13 DIAGNOSIS — I48.0 PAROXYSMAL ATRIAL FIBRILLATION (HCC): ICD-10-CM

## 2023-12-13 DIAGNOSIS — Z00.00 MEDICARE ANNUAL WELLNESS VISIT, SUBSEQUENT: Primary | ICD-10-CM

## 2023-12-13 LAB
ALBUMIN SERPL-MCNC: 4.6 G/DL (ref 3.4–5)
ALBUMIN/GLOB SERPL: 2 {RATIO} (ref 1.1–2.2)
ALP SERPL-CCNC: 122 U/L (ref 40–129)
ALT SERPL-CCNC: 36 U/L (ref 10–40)
ANION GAP SERPL CALCULATED.3IONS-SCNC: 12 MMOL/L (ref 3–16)
AST SERPL-CCNC: 36 U/L (ref 15–37)
BASOPHILS # BLD: 0 K/UL (ref 0–0.2)
BASOPHILS NFR BLD: 0.9 %
BILIRUB SERPL-MCNC: 0.5 MG/DL (ref 0–1)
BUN SERPL-MCNC: 17 MG/DL (ref 7–20)
CALCIUM SERPL-MCNC: 9.4 MG/DL (ref 8.3–10.6)
CHLORIDE SERPL-SCNC: 101 MMOL/L (ref 99–110)
CHOLEST SERPL-MCNC: 134 MG/DL (ref 0–199)
CO2 SERPL-SCNC: 27 MMOL/L (ref 21–32)
CREAT SERPL-MCNC: 0.9 MG/DL (ref 0.8–1.3)
DEPRECATED RDW RBC AUTO: 14.1 % (ref 12.4–15.4)
EOSINOPHIL # BLD: 0.1 K/UL (ref 0–0.6)
EOSINOPHIL NFR BLD: 1.1 %
GFR SERPLBLD CREATININE-BSD FMLA CKD-EPI: >60 ML/MIN/{1.73_M2}
GLUCOSE SERPL-MCNC: 131 MG/DL (ref 70–99)
HCT VFR BLD AUTO: 49.4 % (ref 40.5–52.5)
HDLC SERPL-MCNC: 42 MG/DL (ref 40–60)
HGB BLD-MCNC: 16.5 G/DL (ref 13.5–17.5)
LDLC SERPL CALC-MCNC: 58 MG/DL
LYMPHOCYTES # BLD: 1.1 K/UL (ref 1–5.1)
LYMPHOCYTES NFR BLD: 21.2 %
MCH RBC QN AUTO: 31.3 PG (ref 26–34)
MCHC RBC AUTO-ENTMCNC: 33.4 G/DL (ref 31–36)
MCV RBC AUTO: 93.7 FL (ref 80–100)
MONOCYTES # BLD: 0.5 K/UL (ref 0–1.3)
MONOCYTES NFR BLD: 9.7 %
NEUTROPHILS # BLD: 3.5 K/UL (ref 1.7–7.7)
NEUTROPHILS NFR BLD: 67.1 %
PLATELET # BLD AUTO: 117 K/UL (ref 135–450)
PMV BLD AUTO: 12.5 FL (ref 5–10.5)
POTASSIUM SERPL-SCNC: 4.2 MMOL/L (ref 3.5–5.1)
PROT SERPL-MCNC: 6.9 G/DL (ref 6.4–8.2)
PSA SERPL DL<=0.01 NG/ML-MCNC: 2 NG/ML (ref 0–4)
RBC # BLD AUTO: 5.27 M/UL (ref 4.2–5.9)
SODIUM SERPL-SCNC: 140 MMOL/L (ref 136–145)
TRIGL SERPL-MCNC: 168 MG/DL (ref 0–150)
VLDLC SERPL CALC-MCNC: 34 MG/DL
WBC # BLD AUTO: 5.2 K/UL (ref 4–11)

## 2023-12-13 PROCEDURE — 3079F DIAST BP 80-89 MM HG: CPT | Performed by: FAMILY MEDICINE

## 2023-12-13 PROCEDURE — 36415 COLL VENOUS BLD VENIPUNCTURE: CPT | Performed by: FAMILY MEDICINE

## 2023-12-13 PROCEDURE — 3075F SYST BP GE 130 - 139MM HG: CPT | Performed by: FAMILY MEDICINE

## 2023-12-13 PROCEDURE — 3017F COLORECTAL CA SCREEN DOC REV: CPT | Performed by: FAMILY MEDICINE

## 2023-12-13 PROCEDURE — G8484 FLU IMMUNIZE NO ADMIN: HCPCS | Performed by: FAMILY MEDICINE

## 2023-12-13 PROCEDURE — G0439 PPPS, SUBSEQ VISIT: HCPCS | Performed by: FAMILY MEDICINE

## 2023-12-13 PROCEDURE — 1123F ACP DISCUSS/DSCN MKR DOCD: CPT | Performed by: FAMILY MEDICINE

## 2023-12-13 NOTE — PROGRESS NOTES
Medicare Annual Wellness Visit    Jermaine Marsh is here for Medicare AWV    Assessment & Plan   Medicare annual wellness visit, subsequent  -     Comprehensive Metabolic Panel  -     CBC with Auto Differential  -     Lipid Panel  -     PSA Screening  Screening PSA (prostate specific antigen)  -     Comprehensive Metabolic Panel  -     CBC with Auto Differential  -     Lipid Panel  -     PSA Screening  Thrombocytopenia, unspecified (HCC)  -     Comprehensive Metabolic Panel  -     CBC with Auto Differential  -     Lipid Panel  -     PSA Screening  Paroxysmal atrial fibrillation (HCC)  -     Comprehensive Metabolic Panel  -     CBC with Auto Differential  -     Lipid Panel  -     PSA Screening    Recommendations for Preventive Services Due: see orders and patient instructions/AVS.  Recommended screening schedule for the next 5-10 years is provided to the patient in written form: see Patient Instructions/AVS.     No follow-ups on file. Subjective   The following acute and/or chronic problems were also addressed today:  Chronic medical conditions    Patient's complete Health Risk Assessment and screening values have been reviewed and are found in Flowsheets. The following problems were reviewed today and where indicated follow up appointments were made and/or referrals ordered. Positive Risk Factor Screenings with Interventions:         Alcohol Screening:  Alcohol Use: Heavy Drinker (12/6/2023)    AUDIT-C     Frequency of Alcohol Consumption: 4 or more times a week     Average Number of Drinks: 3 or 4     Frequency of Binge Drinking: Less than monthly      AUDIT-C Score: 6   AUDIT Total Score: 6    Interpretation of AUDIT-C score:   3-7 indicates potential alcohol risk. 8 or more is associated with harmful or hazardous drinking. 15 or more in women, and 15 or more in men, is likely to indicate alcohol dependence.   Interventions:  Patient declined any further intervention or treatment

## 2023-12-27 DIAGNOSIS — E78.2 MIXED HYPERLIPIDEMIA: ICD-10-CM

## 2023-12-27 RX ORDER — SIMVASTATIN 40 MG
TABLET ORAL
Qty: 30 TABLET | Refills: 0 | Status: SHIPPED | OUTPATIENT
Start: 2023-12-27

## 2023-12-27 NOTE — TELEPHONE ENCOUNTER
Last office visit 12/13/2023     Last written      Next office visit scheduled Visit date not found    Requested Prescriptions     Pending Prescriptions Disp Refills    simvastatin (ZOCOR) 40 MG tablet [Pharmacy Med Name: SIMVASTATIN 40 MG TABLET] 30 tablet 0     Sig: TAKE ONE TABLET BY MOUTH EVERY EVENING

## 2024-01-23 DIAGNOSIS — E78.2 MIXED HYPERLIPIDEMIA: ICD-10-CM

## 2024-01-23 RX ORDER — SIMVASTATIN 40 MG
TABLET ORAL
Qty: 30 TABLET | Refills: 0 | Status: SHIPPED | OUTPATIENT
Start: 2024-01-23

## 2024-02-26 DIAGNOSIS — E78.2 MIXED HYPERLIPIDEMIA: ICD-10-CM

## 2024-02-26 RX ORDER — SIMVASTATIN 40 MG
TABLET ORAL
Qty: 30 TABLET | Refills: 0 | Status: SHIPPED | OUTPATIENT
Start: 2024-02-26

## 2024-04-29 ENCOUNTER — PATIENT MESSAGE (OUTPATIENT)
Dept: FAMILY MEDICINE CLINIC | Age: 74
End: 2024-04-29

## 2024-04-29 DIAGNOSIS — R79.89 LOW TESTOSTERONE: Primary | ICD-10-CM

## 2024-05-01 NOTE — TELEPHONE ENCOUNTER
From: Tyrone Osborn  To: Dr. Isaiah Brown  Sent: 4/29/2024 12:01 PM EDT  Subject: Possible low -T    should I come in for blood work to check for low T?..Energy level is low..or should I go to the low T center?...thanks..olena osborn  
Orders placed   
Pt called to schedule lab appt. He is coming in 5/3 at 8:30 to check his testosterone levels. Please place orders in pt's chart.  
90

## 2024-05-03 ENCOUNTER — NURSE ONLY (OUTPATIENT)
Dept: FAMILY MEDICINE CLINIC | Age: 74
End: 2024-05-03
Payer: COMMERCIAL

## 2024-05-03 DIAGNOSIS — R79.89 LOW TESTOSTERONE: Primary | ICD-10-CM

## 2024-05-03 PROCEDURE — 36415 COLL VENOUS BLD VENIPUNCTURE: CPT | Performed by: FAMILY MEDICINE

## 2024-05-03 ASSESSMENT — PATIENT HEALTH QUESTIONNAIRE - PHQ9
SUM OF ALL RESPONSES TO PHQ QUESTIONS 1-9: 0
SUM OF ALL RESPONSES TO PHQ QUESTIONS 1-9: 0
2. FEELING DOWN, DEPRESSED OR HOPELESS: NOT AT ALL
SUM OF ALL RESPONSES TO PHQ QUESTIONS 1-9: 0
SUM OF ALL RESPONSES TO PHQ9 QUESTIONS 1 & 2: 0
1. LITTLE INTEREST OR PLEASURE IN DOING THINGS: NOT AT ALL
SUM OF ALL RESPONSES TO PHQ QUESTIONS 1-9: 0

## 2024-05-03 NOTE — PROGRESS NOTES
Pt in today for Non-Fasting labs.    1 SST, 0 LAV  collected.   right arm    tolerated the procedure well with no complications

## 2024-05-07 LAB
SHBG SERPL-SCNC: 44 NMOL/L (ref 19–76)
TESTOST FREE SERPL-MCNC: 105.9 PG/ML (ref 47–244)
TESTOST SERPL-MCNC: 589 NG/DL (ref 193–740)

## 2024-08-21 NOTE — TELEPHONE ENCOUNTER
Would like to have labs done: CBC, CMP, TSH, lipid, PSA? as per patient EMS " first chemo treatment ( FLOT) last Wednesday, and today abdominal discomfort and vomited coffee ground emesis, and blood pressure low sys 80's" [ hx of stomach CA]

## 2024-11-18 ENCOUNTER — OFFICE VISIT (OUTPATIENT)
Dept: FAMILY MEDICINE CLINIC | Age: 74
End: 2024-11-18

## 2024-11-18 VITALS
WEIGHT: 220 LBS | DIASTOLIC BLOOD PRESSURE: 84 MMHG | BODY MASS INDEX: 26.79 KG/M2 | HEIGHT: 76 IN | SYSTOLIC BLOOD PRESSURE: 120 MMHG

## 2024-11-18 DIAGNOSIS — I48.91 ATRIAL FIBRILLATION, UNSPECIFIED TYPE (HCC): Primary | ICD-10-CM

## 2024-11-18 DIAGNOSIS — Z12.5 ENCOUNTER FOR SCREENING PROSTATE SPECIFIC ANTIGEN (PSA) MEASUREMENT: ICD-10-CM

## 2024-11-18 DIAGNOSIS — I10 ESSENTIAL HYPERTENSION: ICD-10-CM

## 2024-11-18 DIAGNOSIS — Z00.00 MEDICARE ANNUAL WELLNESS VISIT, SUBSEQUENT: ICD-10-CM

## 2024-11-18 LAB
ALBUMIN SERPL-MCNC: 4.6 G/DL (ref 3.4–5)
ALBUMIN/GLOB SERPL: 1.8 {RATIO} (ref 1.1–2.2)
ALP SERPL-CCNC: 103 U/L (ref 40–129)
ALT SERPL-CCNC: 33 U/L (ref 10–40)
ANION GAP SERPL CALCULATED.3IONS-SCNC: 10 MMOL/L (ref 3–16)
AST SERPL-CCNC: 36 U/L (ref 15–37)
BASOPHILS # BLD: 0 K/UL (ref 0–0.2)
BASOPHILS NFR BLD: 0.3 %
BILIRUB SERPL-MCNC: 0.7 MG/DL (ref 0–1)
BUN SERPL-MCNC: 15 MG/DL (ref 7–20)
CALCIUM SERPL-MCNC: 9.8 MG/DL (ref 8.3–10.6)
CHLORIDE SERPL-SCNC: 100 MMOL/L (ref 99–110)
CHOLEST SERPL-MCNC: 174 MG/DL (ref 0–199)
CO2 SERPL-SCNC: 26 MMOL/L (ref 21–32)
CREAT SERPL-MCNC: 1 MG/DL (ref 0.8–1.3)
DEPRECATED RDW RBC AUTO: 14.3 % (ref 12.4–15.4)
EOSINOPHIL # BLD: 0.1 K/UL (ref 0–0.6)
EOSINOPHIL NFR BLD: 1.4 %
GFR SERPLBLD CREATININE-BSD FMLA CKD-EPI: 78 ML/MIN/{1.73_M2}
GLUCOSE SERPL-MCNC: 96 MG/DL (ref 70–99)
HCT VFR BLD AUTO: 50.1 % (ref 40.5–52.5)
HDLC SERPL-MCNC: 57 MG/DL (ref 40–60)
HGB BLD-MCNC: 17.2 G/DL (ref 13.5–17.5)
LDLC SERPL CALC-MCNC: 88 MG/DL
LYMPHOCYTES # BLD: 1 K/UL (ref 1–5.1)
LYMPHOCYTES NFR BLD: 19.7 %
MCH RBC QN AUTO: 31.8 PG (ref 26–34)
MCHC RBC AUTO-ENTMCNC: 34.3 G/DL (ref 31–36)
MCV RBC AUTO: 92.6 FL (ref 80–100)
MONOCYTES # BLD: 0.5 K/UL (ref 0–1.3)
MONOCYTES NFR BLD: 9.4 %
NEUTROPHILS # BLD: 3.6 K/UL (ref 1.7–7.7)
NEUTROPHILS NFR BLD: 69.2 %
PLATELET # BLD AUTO: 106 K/UL (ref 135–450)
PLATELET BLD QL SMEAR: ABNORMAL
PMV BLD AUTO: 11.4 FL (ref 5–10.5)
POTASSIUM SERPL-SCNC: 5.2 MMOL/L (ref 3.5–5.1)
PROT SERPL-MCNC: 7.1 G/DL (ref 6.4–8.2)
PSA SERPL DL<=0.01 NG/ML-MCNC: 2.13 NG/ML (ref 0–4)
RBC # BLD AUTO: 5.41 M/UL (ref 4.2–5.9)
SLIDE REVIEW: ABNORMAL
SODIUM SERPL-SCNC: 136 MMOL/L (ref 136–145)
TRIGL SERPL-MCNC: 145 MG/DL (ref 0–150)
TSH SERPL DL<=0.005 MIU/L-ACNC: 3.65 UIU/ML (ref 0.27–4.2)
VLDLC SERPL CALC-MCNC: 29 MG/DL
WBC # BLD AUTO: 5.1 K/UL (ref 4–11)

## 2024-11-18 ASSESSMENT — ENCOUNTER SYMPTOMS
SHORTNESS OF BREATH: 0
BACK PAIN: 1
SORE THROAT: 0
RHINORRHEA: 0
ABDOMINAL PAIN: 0
TROUBLE SWALLOWING: 0
VOMITING: 0
CHEST TIGHTNESS: 0
WHEEZING: 0
DIARRHEA: 0
NAUSEA: 0
SINUS PRESSURE: 0
FACIAL SWELLING: 0
EYE DISCHARGE: 0
COUGH: 0

## 2024-11-18 ASSESSMENT — PATIENT HEALTH QUESTIONNAIRE - PHQ9
2. FEELING DOWN, DEPRESSED OR HOPELESS: NOT AT ALL
SUM OF ALL RESPONSES TO PHQ QUESTIONS 1-9: 0
1. LITTLE INTEREST OR PLEASURE IN DOING THINGS: NOT AT ALL
SUM OF ALL RESPONSES TO PHQ QUESTIONS 1-9: 0
SUM OF ALL RESPONSES TO PHQ9 QUESTIONS 1 & 2: 0
SUM OF ALL RESPONSES TO PHQ QUESTIONS 1-9: 0
SUM OF ALL RESPONSES TO PHQ QUESTIONS 1-9: 0

## 2024-11-18 NOTE — PROGRESS NOTES
2024     Tyrone Osborn (:  1950) is a 73 y.o. male, here for evaluation of the following medical concerns:    HPI  Medicare annual wellness visit, subsequent  -     Comprehensive Metabolic Panel  -     CBC with Auto Differential  -     Lipid Panel  -     PSA Screening  Screening PSA (prostate specific antigen)  -     Comprehensive Metabolic Panel  -     CBC with Auto Differential  -     Lipid Panel  -     PSA Screening  Reviewed his PSA which was wnl.  Thrombocytopenia, unspecified (HCC)  -     Comprehensive Metabolic Panel  -     CBC with Auto Differential  -     Lipid Panel  -     PSA Screening  Not a problem today.    Paroxysmal atrial fibrillation (HCC)  -     Comprehensive Metabolic Panel  -     CBC with Auto Differential  -     Lipid Panel  -     PSA Screening    Follows with cardiologist, had ablation, has had episodes of lightheadedness on occasion.     Today, denied chest pain, sob, n, v, or diarrhea.   Review of Systems   Constitutional:  Positive for fatigue. Negative for activity change, fever and unexpected weight change.   HENT:  Negative for congestion, ear pain, facial swelling, hearing loss, rhinorrhea, sinus pressure, sore throat and trouble swallowing.    Eyes:  Negative for discharge and visual disturbance.   Respiratory:  Negative for cough, chest tightness, shortness of breath and wheezing.    Cardiovascular:  Negative for chest pain, palpitations and leg swelling.   Gastrointestinal:  Negative for abdominal pain, diarrhea, nausea and vomiting.   Musculoskeletal:  Positive for arthralgias, back pain and gait problem.   Skin:  Negative for rash.   Allergic/Immunologic: Negative for food allergies.   Neurological:  Positive for light-headedness. Negative for dizziness, syncope and headaches.   Hematological:  Does not bruise/bleed easily.   Psychiatric/Behavioral:  Negative for suicidal ideas. The patient is not nervous/anxious.        Prior to Visit Medications

## 2024-11-18 NOTE — PROGRESS NOTES
Medicare Annual Wellness Visit    Tyrone Osborn is here for Annual Exam and Medicare AWV    Assessment & Plan   Atrial fibrillation, unspecified type (HCC)  -     Cardiac holter monitor (<= 48 hours); Future  -     Comprehensive Metabolic Panel  -     CBC with Auto Differential  -     Lipid Panel  -     PSA Screening  -     TSH with Reflex to FT4  Essential hypertension  -     Comprehensive Metabolic Panel  -     CBC with Auto Differential  -     Lipid Panel  -     PSA Screening  -     TSH with Reflex to FT4  Medicare annual wellness visit, subsequent    Recommendations for Preventive Services Due: see orders and patient instructions/AVS.  Recommended screening schedule for the next 5-10 years is provided to the patient in written form: see Patient Instructions/AVS.     No follow-ups on file.     Subjective   The following acute and/or chronic problems were also addressed today:  See note    Patient's complete Health Risk Assessment and screening values have been reviewed and are found in Flowsheets. The following problems were reviewed today and where indicated follow up appointments were made and/or referrals ordered.    Positive Risk Factor Screenings with Interventions:              Inactivity:  On average, how many days per week do you engage in moderate to strenuous exercise (like a brisk walk)?: 0 days (!) Abnormal  On average, how many minutes do you engage in exercise at this level?: 0 min  Interventions:  Patient declined any further interventions or treatment                         Objective   Vitals:    11/18/24 0921   BP: 120/84   Weight: 99.8 kg (220 lb)   Height: 1.93 m (6' 4\")      Body mass index is 26.78 kg/m².      See note          No Known Allergies  Prior to Visit Medications    Medication Sig Taking? Authorizing Provider   simvastatin (ZOCOR) 40 MG tablet TAKE ONE TABLET BY MOUTH EVERY EVENING Yes Isaiah Brown, DO   sildenafil (VIAGRA) 50 MG tablet TAKE 1 TABLET BY MOUTH DAILY AS NEEDED

## 2024-11-21 ENCOUNTER — HOSPITAL ENCOUNTER (OUTPATIENT)
Age: 74
Discharge: HOME | End: 2024-11-23
Payer: MEDICARE

## 2024-11-21 DIAGNOSIS — I48.91 ATRIAL FIBRILLATION, UNSPECIFIED TYPE (HCC): ICD-10-CM

## 2024-11-21 PROCEDURE — 93225 XTRNL ECG REC<48 HRS REC: CPT

## 2024-11-26 LAB
ACQUISITION DURATION: NORMAL S
AVERAGE HEART RATE: 74 BPM
HOOKUP DATE: NORMAL
HOOKUP TIME: NORMAL
MAX HEART RATE TIME/DATE: NORMAL
MAX HEART RATE: 137 BPM
MIN HEART RATE TIME/DATE: NORMAL
MIN HEART RATE: 61 BPM
NUMBER OF QRS COMPLEXES: NORMAL
NUMBER OF SUPRAVENTRICULAR COUPLETS: 12
NUMBER OF SUPRAVENTRICULAR ECTOPICS: NORMAL
NUMBER OF SUPRAVENTRICULAR ISOLATED BEATS: NORMAL
NUMBER OF VENTRICULAR BIGEMINAL CYCLES: 72
NUMBER OF VENTRICULAR COUPLETS: 41
NUMBER OF VENTRICULAR ECTOPICS: 4018

## 2024-12-04 ENCOUNTER — TELEPHONE (OUTPATIENT)
Dept: FAMILY MEDICINE CLINIC | Age: 74
End: 2024-12-04

## 2024-12-04 NOTE — TELEPHONE ENCOUNTER
Velvet from Dr Pearson's office [phone]  would like a copy of the EKG tracing sent to his office asap   Pt has an appt at 2:30 fax to [phone]

## 2025-04-20 ENCOUNTER — APPOINTMENT (OUTPATIENT)
Dept: CT IMAGING | Age: 75
End: 2025-04-20
Payer: MEDICARE

## 2025-04-20 ENCOUNTER — APPOINTMENT (OUTPATIENT)
Dept: GENERAL RADIOLOGY | Age: 75
End: 2025-04-20
Payer: MEDICARE

## 2025-04-20 ENCOUNTER — HOSPITAL ENCOUNTER (OUTPATIENT)
Age: 75
Setting detail: OBSERVATION
Discharge: HOME | End: 2025-04-21
Attending: EMERGENCY MEDICINE
Payer: MEDICARE

## 2025-04-20 DIAGNOSIS — R11.2 NAUSEA AND VOMITING, UNSPECIFIED VOMITING TYPE: ICD-10-CM

## 2025-04-20 DIAGNOSIS — R00.1 BRADYCARDIA WITH 31-40 BEATS PER MINUTE: Primary | ICD-10-CM

## 2025-04-20 DIAGNOSIS — I49.3 FREQUENT PVCS: ICD-10-CM

## 2025-04-20 LAB
ALBUMIN SERPL-MCNC: 4.8 G/DL (ref 3.4–5)
ALBUMIN/GLOB SERPL: 1.7 {RATIO} (ref 1.1–2.2)
ALP SERPL-CCNC: 117 U/L (ref 40–129)
ALT SERPL-CCNC: 28 U/L (ref 10–40)
ANION GAP SERPL CALCULATED.3IONS-SCNC: 18 MMOL/L (ref 3–16)
AST SERPL-CCNC: 37 U/L (ref 15–37)
BASOPHILS # BLD: 0 K/UL (ref 0–0.2)
BASOPHILS NFR BLD: 0.2 %
BILIRUB SERPL-MCNC: 1.2 MG/DL (ref 0–1)
BUN SERPL-MCNC: 14 MG/DL (ref 7–20)
CALCIUM SERPL-MCNC: 9.8 MG/DL (ref 8.3–10.6)
CHLORIDE SERPL-SCNC: 101 MMOL/L (ref 99–110)
CO2 SERPL-SCNC: 19 MMOL/L (ref 21–32)
CREAT SERPL-MCNC: 1 MG/DL (ref 0.8–1.3)
DEPRECATED RDW RBC AUTO: 13.8 % (ref 12.4–15.4)
EOSINOPHIL # BLD: 0 K/UL (ref 0–0.6)
EOSINOPHIL NFR BLD: 0 %
FLUAV + FLUBV AG NOSE IA.RAPID: NOT DETECTED
FLUAV + FLUBV AG NOSE IA.RAPID: NOT DETECTED
GFR SERPLBLD CREATININE-BSD FMLA CKD-EPI: 78 ML/MIN/{1.73_M2}
GLUCOSE SERPL-MCNC: 146 MG/DL (ref 70–99)
HCT VFR BLD AUTO: 49.1 % (ref 40.5–52.5)
HGB BLD-MCNC: 16.8 G/DL (ref 13.5–17.5)
LIPASE SERPL-CCNC: 19 U/L (ref 13–60)
LYMPHOCYTES # BLD: 0.4 K/UL (ref 1–5.1)
LYMPHOCYTES NFR BLD: 4.4 %
MAGNESIUM SERPL-MCNC: 1.88 MG/DL (ref 1.8–2.4)
MCH RBC QN AUTO: 31.6 PG (ref 26–34)
MCHC RBC AUTO-ENTMCNC: 34.1 G/DL (ref 31–36)
MCV RBC AUTO: 92.5 FL (ref 80–100)
MONOCYTES # BLD: 0.4 K/UL (ref 0–1.3)
MONOCYTES NFR BLD: 4.5 %
NEUTROPHILS # BLD: 8.9 K/UL (ref 1.7–7.7)
NEUTROPHILS NFR BLD: 90.9 %
PLATELET # BLD AUTO: 108 K/UL (ref 135–450)
PLATELET BLD QL SMEAR: ABNORMAL
PMV BLD AUTO: 11.7 FL (ref 5–10.5)
POTASSIUM SERPL-SCNC: 4.3 MMOL/L (ref 3.5–5.1)
PROT SERPL-MCNC: 7.7 G/DL (ref 6.4–8.2)
RBC # BLD AUTO: 5.31 M/UL (ref 4.2–5.9)
RBC MORPH BLD: NORMAL
SARS-COV-2 RDRP RESP QL NAA+PROBE: NOT DETECTED
SLIDE REVIEW: ABNORMAL
SODIUM SERPL-SCNC: 138 MMOL/L (ref 136–145)
TROPONIN, HIGH SENSITIVITY: 11 NG/L (ref 0–22)
WBC # BLD AUTO: 9.7 K/UL (ref 4–11)

## 2025-04-20 PROCEDURE — 6360000004 HC RX CONTRAST MEDICATION: Performed by: EMERGENCY MEDICINE

## 2025-04-20 PROCEDURE — 99285 EMERGENCY DEPT VISIT HI MDM: CPT

## 2025-04-20 PROCEDURE — 93005 ELECTROCARDIOGRAM TRACING: CPT | Performed by: EMERGENCY MEDICINE

## 2025-04-20 PROCEDURE — 71045 X-RAY EXAM CHEST 1 VIEW: CPT

## 2025-04-20 PROCEDURE — 85025 COMPLETE CBC W/AUTO DIFF WBC: CPT

## 2025-04-20 PROCEDURE — 83735 ASSAY OF MAGNESIUM: CPT

## 2025-04-20 PROCEDURE — 96374 THER/PROPH/DIAG INJ IV PUSH: CPT

## 2025-04-20 PROCEDURE — 84484 ASSAY OF TROPONIN QUANT: CPT

## 2025-04-20 PROCEDURE — 84443 ASSAY THYROID STIM HORMONE: CPT

## 2025-04-20 PROCEDURE — 80053 COMPREHEN METABOLIC PANEL: CPT

## 2025-04-20 PROCEDURE — 96361 HYDRATE IV INFUSION ADD-ON: CPT

## 2025-04-20 PROCEDURE — 2580000003 HC RX 258: Performed by: EMERGENCY MEDICINE

## 2025-04-20 PROCEDURE — 87635 SARS-COV-2 COVID-19 AMP PRB: CPT

## 2025-04-20 PROCEDURE — 74177 CT ABD & PELVIS W/CONTRAST: CPT

## 2025-04-20 PROCEDURE — 83690 ASSAY OF LIPASE: CPT

## 2025-04-20 PROCEDURE — 6360000002 HC RX W HCPCS: Performed by: EMERGENCY MEDICINE

## 2025-04-20 PROCEDURE — 87502 INFLUENZA DNA AMP PROBE: CPT

## 2025-04-20 RX ORDER — 0.9 % SODIUM CHLORIDE 0.9 %
1000 INTRAVENOUS SOLUTION INTRAVENOUS ONCE
Status: COMPLETED | OUTPATIENT
Start: 2025-04-20 | End: 2025-04-20

## 2025-04-20 RX ORDER — IOPAMIDOL 755 MG/ML
75 INJECTION, SOLUTION INTRAVASCULAR
Status: COMPLETED | OUTPATIENT
Start: 2025-04-20 | End: 2025-04-20

## 2025-04-20 RX ORDER — ONDANSETRON 2 MG/ML
8 INJECTION INTRAMUSCULAR; INTRAVENOUS ONCE
Status: COMPLETED | OUTPATIENT
Start: 2025-04-20 | End: 2025-04-20

## 2025-04-20 RX ADMIN — ONDANSETRON 8 MG: 2 INJECTION, SOLUTION INTRAMUSCULAR; INTRAVENOUS at 22:00

## 2025-04-20 RX ADMIN — SODIUM CHLORIDE 1000 ML: 0.9 INJECTION, SOLUTION INTRAVENOUS at 21:59

## 2025-04-20 RX ADMIN — IOPAMIDOL 75 ML: 755 INJECTION, SOLUTION INTRAVENOUS at 22:40

## 2025-04-20 ASSESSMENT — PAIN DESCRIPTION - LOCATION: LOCATION: ABDOMEN

## 2025-04-20 ASSESSMENT — PAIN - FUNCTIONAL ASSESSMENT
PAIN_FUNCTIONAL_ASSESSMENT: 0-10
PAIN_FUNCTIONAL_ASSESSMENT: ACTIVITIES ARE NOT PREVENTED

## 2025-04-20 ASSESSMENT — PAIN SCALES - GENERAL: PAINLEVEL_OUTOF10: 5

## 2025-04-20 ASSESSMENT — PAIN DESCRIPTION - DESCRIPTORS: DESCRIPTORS: SHARP

## 2025-04-20 ASSESSMENT — LIFESTYLE VARIABLES
HOW OFTEN DO YOU HAVE A DRINK CONTAINING ALCOHOL: 4 OR MORE TIMES A WEEK
HOW MANY STANDARD DRINKS CONTAINING ALCOHOL DO YOU HAVE ON A TYPICAL DAY: 1 OR 2

## 2025-04-20 ASSESSMENT — PAIN DESCRIPTION - PAIN TYPE: TYPE: ACUTE PAIN

## 2025-04-20 ASSESSMENT — PAIN DESCRIPTION - FREQUENCY: FREQUENCY: CONTINUOUS

## 2025-04-20 ASSESSMENT — PAIN DESCRIPTION - ONSET: ONSET: ON-GOING

## 2025-04-20 ASSESSMENT — PAIN DESCRIPTION - ORIENTATION: ORIENTATION: MID;UPPER

## 2025-04-21 VITALS
RESPIRATION RATE: 18 BRPM | DIASTOLIC BLOOD PRESSURE: 96 MMHG | BODY MASS INDEX: 26.78 KG/M2 | TEMPERATURE: 97.5 F | SYSTOLIC BLOOD PRESSURE: 115 MMHG | HEIGHT: 76 IN | WEIGHT: 219.9 LBS | OXYGEN SATURATION: 100 % | HEART RATE: 56 BPM

## 2025-04-21 PROBLEM — I49.8 BRADYARRHYTHMIA: Status: ACTIVE | Noted: 2025-04-21

## 2025-04-21 LAB
EKG ATRIAL RATE: 71 BPM
EKG DIAGNOSIS: NORMAL
EKG P AXIS: 66 DEGREES
EKG P-R INTERVAL: 226 MS
EKG Q-T INTERVAL: 446 MS
EKG QRS DURATION: 98 MS
EKG QTC CALCULATION (BAZETT): 484 MS
EKG R AXIS: 19 DEGREES
EKG T AXIS: 0 DEGREES
EKG VENTRICULAR RATE: 71 BPM
TSH SERPL DL<=0.005 MIU/L-ACNC: 2.45 UIU/ML (ref 0.27–4.2)

## 2025-04-21 PROCEDURE — 6370000000 HC RX 637 (ALT 250 FOR IP)

## 2025-04-21 PROCEDURE — G0378 HOSPITAL OBSERVATION PER HR: HCPCS

## 2025-04-21 PROCEDURE — 96361 HYDRATE IV INFUSION ADD-ON: CPT

## 2025-04-21 PROCEDURE — 2580000003 HC RX 258

## 2025-04-21 PROCEDURE — 99222 1ST HOSP IP/OBS MODERATE 55: CPT | Performed by: INTERNAL MEDICINE

## 2025-04-21 PROCEDURE — 94760 N-INVAS EAR/PLS OXIMETRY 1: CPT

## 2025-04-21 PROCEDURE — 93010 ELECTROCARDIOGRAM REPORT: CPT | Performed by: INTERNAL MEDICINE

## 2025-04-21 PROCEDURE — 6370000000 HC RX 637 (ALT 250 FOR IP): Performed by: HOSPITALIST

## 2025-04-21 PROCEDURE — 97116 GAIT TRAINING THERAPY: CPT

## 2025-04-21 PROCEDURE — 97161 PT EVAL LOW COMPLEX 20 MIN: CPT

## 2025-04-21 PROCEDURE — 2500000003 HC RX 250 WO HCPCS

## 2025-04-21 RX ORDER — METOPROLOL SUCCINATE 25 MG/1
25 TABLET, EXTENDED RELEASE ORAL 2 TIMES DAILY
Status: DISCONTINUED | OUTPATIENT
Start: 2025-04-21 | End: 2025-04-21 | Stop reason: HOSPADM

## 2025-04-21 RX ORDER — MAGNESIUM SULFATE IN WATER 40 MG/ML
2000 INJECTION, SOLUTION INTRAVENOUS PRN
Status: DISCONTINUED | OUTPATIENT
Start: 2025-04-21 | End: 2025-04-21 | Stop reason: HOSPADM

## 2025-04-21 RX ORDER — POTASSIUM CHLORIDE 1500 MG/1
40 TABLET, EXTENDED RELEASE ORAL PRN
Status: DISCONTINUED | OUTPATIENT
Start: 2025-04-21 | End: 2025-04-21 | Stop reason: HOSPADM

## 2025-04-21 RX ORDER — ACETAMINOPHEN 650 MG/1
650 SUPPOSITORY RECTAL EVERY 6 HOURS PRN
Status: DISCONTINUED | OUTPATIENT
Start: 2025-04-21 | End: 2025-04-21 | Stop reason: HOSPADM

## 2025-04-21 RX ORDER — SODIUM CHLORIDE 9 MG/ML
INJECTION, SOLUTION INTRAVENOUS PRN
Status: DISCONTINUED | OUTPATIENT
Start: 2025-04-21 | End: 2025-04-21 | Stop reason: HOSPADM

## 2025-04-21 RX ORDER — PROMETHAZINE HYDROCHLORIDE 25 MG/ML
6.25 INJECTION, SOLUTION INTRAMUSCULAR; INTRAVENOUS EVERY 6 HOURS PRN
Status: DISCONTINUED | OUTPATIENT
Start: 2025-04-21 | End: 2025-04-21 | Stop reason: HOSPADM

## 2025-04-21 RX ORDER — SODIUM CHLORIDE 0.9 % (FLUSH) 0.9 %
5-40 SYRINGE (ML) INJECTION EVERY 12 HOURS SCHEDULED
Status: DISCONTINUED | OUTPATIENT
Start: 2025-04-21 | End: 2025-04-21 | Stop reason: HOSPADM

## 2025-04-21 RX ORDER — POTASSIUM CHLORIDE 7.45 MG/ML
10 INJECTION INTRAVENOUS PRN
Status: DISCONTINUED | OUTPATIENT
Start: 2025-04-21 | End: 2025-04-21 | Stop reason: HOSPADM

## 2025-04-21 RX ORDER — ATORVASTATIN CALCIUM 40 MG/1
40 TABLET, FILM COATED ORAL DAILY
Status: DISCONTINUED | OUTPATIENT
Start: 2025-04-21 | End: 2025-04-21 | Stop reason: HOSPADM

## 2025-04-21 RX ORDER — SODIUM CHLORIDE 0.9 % (FLUSH) 0.9 %
5-40 SYRINGE (ML) INJECTION PRN
Status: DISCONTINUED | OUTPATIENT
Start: 2025-04-21 | End: 2025-04-21 | Stop reason: HOSPADM

## 2025-04-21 RX ORDER — METOPROLOL SUCCINATE 50 MG/1
50 TABLET, EXTENDED RELEASE ORAL 2 TIMES DAILY
Status: ON HOLD | COMMUNITY
End: 2025-04-21 | Stop reason: HOSPADM

## 2025-04-21 RX ORDER — POLYETHYLENE GLYCOL 3350 17 G/17G
17 POWDER, FOR SOLUTION ORAL DAILY PRN
Status: DISCONTINUED | OUTPATIENT
Start: 2025-04-21 | End: 2025-04-21 | Stop reason: HOSPADM

## 2025-04-21 RX ORDER — METOPROLOL SUCCINATE 50 MG/1
50 TABLET, EXTENDED RELEASE ORAL 2 TIMES DAILY
Status: DISCONTINUED | OUTPATIENT
Start: 2025-04-21 | End: 2025-04-21

## 2025-04-21 RX ORDER — METOPROLOL SUCCINATE 25 MG/1
25 TABLET, EXTENDED RELEASE ORAL 2 TIMES DAILY
Qty: 30 TABLET | Refills: 0 | Status: SHIPPED | OUTPATIENT
Start: 2025-04-21

## 2025-04-21 RX ORDER — ACETAMINOPHEN 325 MG/1
650 TABLET ORAL EVERY 6 HOURS PRN
Status: DISCONTINUED | OUTPATIENT
Start: 2025-04-21 | End: 2025-04-21 | Stop reason: HOSPADM

## 2025-04-21 RX ORDER — SODIUM CHLORIDE 9 MG/ML
INJECTION, SOLUTION INTRAVENOUS CONTINUOUS
Status: DISCONTINUED | OUTPATIENT
Start: 2025-04-21 | End: 2025-04-21 | Stop reason: HOSPADM

## 2025-04-21 RX ADMIN — Medication 3 MG: at 01:18

## 2025-04-21 RX ADMIN — APIXABAN 5 MG: 5 TABLET, FILM COATED ORAL at 08:18

## 2025-04-21 RX ADMIN — ATORVASTATIN CALCIUM 40 MG: 40 TABLET, FILM COATED ORAL at 08:18

## 2025-04-21 RX ADMIN — SODIUM CHLORIDE: 0.9 INJECTION, SOLUTION INTRAVENOUS at 02:44

## 2025-04-21 RX ADMIN — APIXABAN 5 MG: 5 TABLET, FILM COATED ORAL at 01:18

## 2025-04-21 RX ADMIN — METOPROLOL SUCCINATE 25 MG: 25 TABLET, EXTENDED RELEASE ORAL at 10:34

## 2025-04-21 RX ADMIN — Medication 10 ML: at 08:19

## 2025-04-21 ASSESSMENT — PAIN SCALES - GENERAL: PAINLEVEL_OUTOF10: 0

## 2025-04-21 NOTE — PROGRESS NOTES
4 Eyes Skin Assessment     NAME:  Tyrone Osborn  YOB: 1950  MEDICAL RECORD NUMBER:  [id number]    The patient is being assessed for  Admission    I agree that at least one RN has performed a thorough Head to Toe Skin Assessment on the patient. ALL assessment sites listed below have been assessed.      Areas assessed by both nurses:    Head, Face, Ears, Shoulders, Back, Chest, Arms, Elbows, Hands, Sacrum. Buttock, Coccyx, Ischium, Legs. Feet and Heels, and Under Medical Devices         Does the Patient have a Wound? No noted wound(s)       Anthony Prevention initiated by RN: No  Wound Care Orders initiated by RN: No    Pressure Injury (Stage 3,4, Unstageable, DTI, NWPT, and Complex wounds) if present, place Wound referral order by RN under : No    New Ostomies, if present place, Ostomy referral order under : No     Nurse 1 eSignature: Electronically signed by Pennie Mendoza RN on 4/21/25 at 3:11 AM EDT    **SHARE this note so that the co-signing nurse can place an eSignature**    Nurse 2 eSignature: Electronically signed by PAULA GUERRIER RN on 4/21/25 at 3:12 AM EDT

## 2025-04-21 NOTE — PLAN OF CARE
Problem: Discharge Planning  Goal: Discharge to home or other facility with appropriate resources  Outcome: Progressing     Problem: Pain  Goal: Verbalizes/displays adequate comfort level or baseline comfort level  Outcome: Progressing     Problem: Cardiovascular - Adult  Goal: Maintains optimal cardiac output and hemodynamic stability  Outcome: Progressing  Goal: Absence of cardiac dysrhythmias or at baseline  Outcome: Progressing     Problem: Gastrointestinal - Adult  Goal: Minimal or absence of nausea and vomiting  Outcome: Progressing  Goal: Maintains or returns to baseline bowel function  Outcome: Progressing  Goal: Maintains adequate nutritional intake  Outcome: Progressing     Problem: ABCDS Injury Assessment  Goal: Absence of physical injury  Outcome: Progressing

## 2025-04-21 NOTE — DISCHARGE INSTRUCTIONS
Your extended release metoprolol dose has been decreased to 25 mg twice daily, please follow-up with your cardiologist and your PCP in the next 1 to 2 weeks

## 2025-04-21 NOTE — PROGRESS NOTES
Reviewed discharge paperwork with patient. Denies additional questions and verbalizes understanding. IV removed with catheter intact. A dressing applied with no drainage noted. Patient transported to [facility] via wheelchair for discharge to home.     Electronically signed by Ashley Zee RN on 4/21/2025 at 2:42 PM

## 2025-04-21 NOTE — DISCHARGE SUMMARY
BILITOT 1.2*   ALKPHOS 117     Time spent on discharge, greater than 30 minutes    Electronically signed by Jay Elizabeth MD on 4/21/2025 at 1:28 PM    This note was generated completely or in part using Dragon voice recognition software, please excuse ant inaccuracies or misstatements.

## 2025-04-21 NOTE — PROGRESS NOTES
Pharmacy Medication Reconciliation Note     List of medications patient is currently taking is complete.    Source of information:   1. Conversation with pt at bedside   2. Fill hx     [unfilled]    Notes regarding home medications:   1.  Confirms Eliquis 5 mg BID ( fills in Zenia)    Nisa Santos RPH   4/21/2025  10:04 AM

## 2025-04-21 NOTE — H&P
V2.0  History and Physical      Name:  Tyrone Osborn /Age/Sex: 1950  (74 y.o. male)   MRN & CSN:  [id number] & [id number] Encounter Date/Time: 2025 12:18 AM EDT   Location:   PCP: Isaiah Brown DO       Hospital Day: 2    Assessment and Plan:   Tyrone Osborn is a 74 y.o. male with a pmh of A-fib status post ablation on Eliquis, hypertension, hyperlipidemia who presents with Bradyarrhythmia    Hospital Problems           Last Modified POA    * (Principal) Bradyarrhythmia 2025 Yes       Plan:  # Bradyarrhythmia  Patient has A-fib status post ablation, currently is on Eliquis and metoprolol twice daily.  According to patient he took his medication last night but today he didn't take any meds today am due to n/v.  - Upon arrival patient heart rate was 44 however it has been consistently normal  - EKG normal sinus rhythm at the rate 71.  Sinus arrhythmia with first-degree AV block with frequent PVCs.  - Troponin negative TSH normal  - No electrolyte imbalance other than magnesium 1.88  - Chest x-ray no acute cardiopulmonary etiology  - Will continue to monitor on telemetry  - Will continue Eliquis and metoprolol  - Will consult to cardiologist patient has frequent PVCs    #Intractable nausea and vomiting  - Normal vital signs, no electrolyte imbalance  - CT of abdominal pelvic no acute etiology, extensive colonic diverticulosis  - Will provide IV fluid and antiemetic    #Dizziness could be secondary to dehydration due to nausea and vomiting  - Vitals within normal limit, hemoglobin normal, will currently monitor    #Hypertension  #Hyperlipidemia  - Currently stable will continue home medication    #Alcohol use disorder  Patient drinks every day 3-4 Mifflin whiskey.  Counseling provided    DVT Prophylaxis: Eliquis  Diet: Regular diet    Advance care planning:  Advance care planning the patient for total of 16 minutes.  Full code, DNR CC, DNR CCA, power of  and living will were

## 2025-04-21 NOTE — PROGRESS NOTES
[facility] - Physical Therapy   Phone: [phone]    Physical Therapy  Unit: [facility] 4W MED SURG    [x] Initial Evaluation            [] Daily Treatment Note         [x] Discharge Summary      Patient: Tyrone Osborn   : 1950   MRN: [id number]   Date of Service:  2025  Staff Mobility Recommendation: Supervision only.     AM-PAC score:   Discharge Recommendations: Tyrone Osborn scored a  on the AM-PAC short mobility form.  At this time, no further PT is recommended upon discharge due to patient at independent level.  Recommend patient returns to prior setting with prior services.    Admitting Diagnosis: Bradyarrhythmia  Ordering Physician: Dr. Elizabeth  Current Admission Summary: 75 y/o male admit 2025 with Bradycardia, Intractable N&V, Dizziness. PMH as noted including A-Fib (H/O Ablation),  Hernia Repair, R Shld Surg.      Past Medical History:  has a past medical history of Atrial fibrillation (HCC), Detached retina, Hyperlipidemia, Hypertension, and Sleep apnea.  Past Surgical History:  has a past surgical history that includes hernia repair; Cataract removal (2011); Retinal detachment surgery (2010); Colonoscopy (); Cardiac catheterization (2015); Atrial ablation surgery (); and Shoulder arthroscopy (Right, 3/8/2019).    Clinical Assessment: Patient presenting at independent level for completion of required mobility tasks for return to home.  Eval with d/c at this time.  No therapy services indicated.      DME Required For Discharge: no DME required at discharge      Restrictions:  Precautions/Restrictions: no restrictions  Weight Bearing Restrictions: no restrictions    Required Braces/Orthotics: no braces required  Positional Restrictions:no positional restrictions    Home Environment / Functional History  Lives With: Spouse  Type of Home: House  Home Layout: Multi-level, Bed/Bath upstairs, Laundry in basement  Home Access: Stairs to enter with rails,

## 2025-04-21 NOTE — CARE COORDINATION
Discharge Planning:      (CM) reviewed the patient's chart to assess needs. Patient's Readmission Risk Score is  OBS . Patient's medical insurance is  Payor: [employer] MEDICARE / Plan: [employer] CHOICE-PPO MEDICARE / Product Type: *No Product type* / .  Patient's PCP is Isaiah Brown DO .  No needs anticipated, at this time. CM team to follow. Staff to inform CM if additional discharge needs arise.    Pts preferred pharmacy is   [facility] [id number] [place], [place] - [address] - P [phone] - F [phone]  [address] [facility] [address]  Phone: [phone] Fax: [phone]    Electronically signed by Mei Hammer RN on 4/21/2025 at 2:03 PM

## 2025-04-21 NOTE — PLAN OF CARE
Problem: Discharge Planning  Goal: Discharge to home or other facility with appropriate resources  4/21/2025 0910 by Ashley Zee RN  Outcome: Progressing  4/21/2025 0317 by Pennie Mendoza RN  Outcome: Progressing     Problem: Pain  Goal: Verbalizes/displays adequate comfort level or baseline comfort level  4/21/2025 0910 by Ashley Zee RN  Outcome: Progressing  4/21/2025 0317 by Pennie Mendoza RN  Outcome: Progressing     Problem: Cardiovascular - Adult  Goal: Maintains optimal cardiac output and hemodynamic stability  4/21/2025 0910 by Ashley Zee RN  Outcome: Progressing  4/21/2025 0317 by Pennie Mendoza RN  Outcome: Progressing  Goal: Absence of cardiac dysrhythmias or at baseline  4/21/2025 0910 by Ashley Zee RN  Outcome: Progressing  4/21/2025 0317 by Pennie Mendoza RN  Outcome: Progressing     Problem: Gastrointestinal - Adult  Goal: Minimal or absence of nausea and vomiting  4/21/2025 0910 by Ashley Zee RN  Outcome: Progressing  4/21/2025 0317 by Pennie Mendoza RN  Outcome: Progressing  Goal: Maintains or returns to baseline bowel function  4/21/2025 0910 by Ashley Zee RN  Outcome: Progressing  4/21/2025 0317 by Pennie Mendoza RN  Outcome: Progressing  Goal: Maintains adequate nutritional intake  4/21/2025 0910 by Ashley Zee RN  Outcome: Progressing  4/21/2025 0317 by Pennie Mendoza RN  Outcome: Progressing     Problem: ABCDS Injury Assessment  Goal: Absence of physical injury  4/21/2025 0910 by Ashley Zee RN  Outcome: Progressing  4/21/2025 0317 by Pennie Mendoza RN  Outcome: Progressing

## 2025-04-21 NOTE — ED NOTES
Introduced self to pt, pt connected to cardiac monitor, side rails x2, call light is within the patient's reach, and instructions for use were provided.  The bed is in the lowest position with wheels locked.  The patient has been advised to notify staff, using the call light, if there is a need to get up or use restroom.  The patient verbalized understanding of safety precautions and how to contact staff for assistance.

## 2025-04-21 NOTE — ED TRIAGE NOTES
Pt comes into ED with c/o of N/V that has been persisting since this morning @ approximately 0700. Pt sts his last meal was blackened fish last night for dinner. Pt denies chest pain or SOB. Pt is diaphoretic. During triage pt pulse rate intermittently bradycardic reaching as low as 32 bpm. Pt also experiencing dizziness, unable to ambulate at this time.

## 2025-04-21 NOTE — ED NOTES
ED TO INPATIENT SBAR HANDOFF    Patient Name: Tyrone Osborn   Preferred Name: Narinder  : 1950  74 y.o.   Family/Caregiver Present: no   Code Status Order: Full Code  PO Status: NPO:No  Telemetry Order: Yes  C-SSRS: Risk of Suicide: No Risk  Sitter no     Restraints:     Sepsis Risk Score      Situation  Chief Complaint   Patient presents with    Nausea    Vomiting    Dizziness     Brief Description of Patient's Condition:   Mental Status: oriented, alert, coherent, logical, thought processes intact, and able to concentrate and follow conversation  Arrived from:Home  Imaging:   CT ABDOMEN PELVIS W IV CONTRAST Additional Contrast? None   Final Result   1.  Negative for bowel obstruction.      2.  Extensive colonic diverticulosis.         XR CHEST PORTABLE   Final Result   No acute cardiopulmonary process.           Abnormal labs:   Abnormal Labs Reviewed   CBC WITH AUTO DIFFERENTIAL - Abnormal; Notable for the following components:       Result Value    Platelets 108 (*)     MPV 11.7 (*)     Neutrophils Absolute 8.9 (*)     Lymphocytes Absolute 0.4 (*)     All other components within normal limits   COMPREHENSIVE METABOLIC PANEL - Abnormal; Notable for the following components:    CO2 19 (*)     Anion Gap 18 (*)     Glucose 146 (*)     Total Bilirubin 1.2 (*)     All other components within normal limits       Background  Allergies: No Known Allergies  History:   Past Medical History:   Diagnosis Date    Atrial fibrillation (HCC)     dr abdul    Detached retina noc     dr castle    Hyperlipidemia     Hypertension     Sleep apnea     NO C-PAP       Assessment  Vitals: MEWS Score: 1  Level of Consciousness: Alert (0)   Vitals:    25 2334 25 2349 25 0004 25 0134   BP: (!) 151/88 (!) 155/98 (!) 147/91 103/83   Pulse: 75 73 75 80   Resp: 16  26   Temp:       TempSrc:       SpO2: 99%  97% 92%   Weight:       Height:         Deterioration Index (DI): Deterioration Index:

## 2025-04-21 NOTE — ED PROVIDER NOTES
Aneurysm, Acute Coronary Syndrome, Ischemic Bowel, Bowel Obstruction (including Gastric Outlet Obstruction), cyclical vomiting syndrome, PUD, GERD, Gastritis, Acute Cholecystitis, appendicitis, pancreatitis, Hepatitis, Colitis, SMA Syndrome, Mesenteric Steal Syndrome, Splanchnic Vein Thrombosis, irritable bowel syndrome, diverticulitis, other      EKG-ordered to rule out myocardial infarction, rhythm disturbances, conduction disturbances, LVH, MICK, pericarditis, voltage abnormalities, or other pathology that might be causing the patient's symptoms.    CT Abdomen/pelvis- Abdominal Aortic Aneurysm, Ischemic Bowel, Bowel Obstruction (including Gastric Outlet Obstruction), Acute Cholecystitis, appendicitis, pancreatitis, Colitis, Splanchnic Vein Thrombosis, ureteral obstruction, pelvic inflammatory disease, any other pathology will cause the patient's symptoms.    Chest x-ray-chest x-ray was ordered to rule out pneumonia, pneumothorax, congestive heart failure, cardiomegaly, chest masses, aortic aneurysm, hiatal hernia, rib fractures, or any other pathology that might be causing the patient's symptoms    CBC-CBC was ordered to rule out anemia, infection, abnormal platelet count, polycythemia, abnormal Red cell pathology, or any other pathology that might be causing the patient's symptoms    CMP-CMP was ordered to rule out electrolyte abnormalities, kidney dysfunction, electrolyte imbalance, abnormal transaminases, liver dysfunction, or any other pathology that might be causing the patient's symptoms.    Urine-urinalysis was ordered to rule out infection, renal failure, dehydration, pregnancy, proteinuria, bilirubinuria, or any other pathology that might be causing the patient's symptoms    The patient's blood pressure was found to be elevated according to CMS/Medicare and the Affordable Care Act/[facility] criteria. Elevated blood pressure could occur because of pain or anxiety or other reasons and does not mean that

## 2025-04-21 NOTE — CONSULTS
[facility]   Electrophysiology Consultation     Date: 4/21/2025  Reason for Consultation: Bradycardia  Consult Requesting Physician: Jay Elizabeth MD     CC: Nausea, vomiting    HPI:   Tyrone Osborn is a 74 y.o. male with a history of paroxysmal atrial fibrillation for which he underwent cryoballoon ablation, premature ventricular and atrial complexes     Review of System:  Complete 10 point ROS performed and negative unless noted in above HPI or below    Prior to Admission medications    Medication Sig Start Date End Date Taking? Authorizing Provider   apixaban (ELIQUIS) 5 MG TABS tablet Take 1 tablet by mouth 2 times daily   Yes Diaz Warren MD   metoprolol succinate (TOPROL XL) 50 MG extended release tablet Take 1 tablet by mouth in the morning and at bedtime   Yes Diaz Warren MD   simvastatin (ZOCOR) 40 MG tablet TAKE ONE TABLET BY MOUTH EVERY EVENING 2/26/24   Isaiah Brown, DO   Multiple Vitamins-Minerals (CENTRUM SILVER PO) Take 1 tablet by mouth daily  Patient not taking: Reported on 11/18/2024    ProviderDiaz MD       Past Medical History:   Diagnosis Date    Atrial fibrillation (HCC)     dr abdul    Detached retina noc 2010    dr castle    Hyperlipidemia     Hypertension     Sleep apnea     NO C-PAP        Past Surgical History:   Procedure Laterality Date    ATRIAL ABLATION SURGERY  112/2016    CARDIAC CATHETERIZATION  jan 2015    normal    CATARACT REMOVAL  11/2011    COLONOSCOPY  2009    HERNIA REPAIR      RETINAL DETACHMENT SURGERY  nov 2010    SHOULDER ARTHROSCOPY Right 3/8/2019    RIGHT SHOULDER ARTHROSCOPY, SUBACROMIAL DECOMPRESSION, ROTATOR CUFF REPAIR performed by Tyrone Meeks MD at [facility] OR       No Known Allergies    Social History:  Reviewed.  reports that he has never smoked. He uses smokeless tobacco. He reports current alcohol use. He reports that he does not use drugs.     Family History:  Reviewed. No family history of SCD.

## 2025-04-22 ENCOUNTER — CARE COORDINATION (OUTPATIENT)
Dept: CASE MANAGEMENT | Age: 75
End: 2025-04-22

## 2025-04-22 DIAGNOSIS — I49.8 BRADYARRHYTHMIA: Primary | ICD-10-CM

## 2025-04-22 PROCEDURE — 1111F DSCHRG MED/CURRENT MED MERGE: CPT | Performed by: FAMILY MEDICINE

## 2025-04-22 NOTE — CARE COORDINATION
Care Transitions Note    Initial Call - Call within 2 business days of discharge: Yes    Patient Current Location:  [place]    Care Transition Nurse contacted the patient by telephone to perform post hospital discharge assessment, verified name and  as identifiers.  Provided introduction to self, and explanation of the Care Transition Nurse role.    Patient: Tyrone Osborn    Patient : 1950   MRN: [id number]    Reason for Admission: Bradycardia   Discharge Date: 25  RURS: No data recorded    Last Discharge Facility       Date Complaint Diagnosis Description Type Department Provider    25 Nausea; Vomiting; Dizziness Bradycardia with 31-40 beats per minute ... ED to Hosp-Admission (Discharged) (ADMITTED) [facility] 4W Jay Elizabeth MD; Isaias Bermudez...            Was this an external facility discharge? No    Additional needs identified to be addressed with provider   No needs identified             Method of communication with provider: none.    Patients top risk factors for readmission: medical condition-.    Interventions to address risk factors:   Education: .   Review of patient management of conditions/medications: .    Care Summary Note: CTN spoke with patient who reported he is doing alright. Patient noted still has some slight dizziness and denied any weakness or cp. CTN reviewed all medications with patient who reported he is taking all as prescribed. Patient reported he has a bp monitor but didn't check his BP or HR today. Patient encouraged to go from sitting to standing slowly and notify provider with any worsening symptoms. Denies any acute needs at present time.  Agreeable to f/u calls.  Educated on the use of urgent care or physician’s 24 hr access line if assistance is needed after hours.       Care Transition Nurse reviewed discharge instructions, medical action plan, and red flags with patient. The patient was given an opportunity to ask questions; all questions answered at this

## 2025-04-29 ENCOUNTER — CARE COORDINATION (OUTPATIENT)
Dept: CASE MANAGEMENT | Age: 75
End: 2025-04-29

## 2025-04-29 NOTE — CARE COORDINATION
Care Transitions Note    Follow Up Call     Attempted to reach patient for transitions of care follow up.  Unable to reach patient.      Outreach Attempts:   HIPAA compliant voicemail left for patient.     Care Summary Note: LVM     Follow Up Appointment:   Future Appointments         Provider Specialty Dept Phone    4/30/2025 10:30 AM Isaiah Brown DO Primary Care [phone]            Plan for follow-up call in 6-10 days based on severity of symptoms and risk factors. Plan for next call: self management-.     Betty Horn, MSN, RN, [facility]  Care Transition Nurse  [phone] mobile

## 2025-04-30 ENCOUNTER — OFFICE VISIT (OUTPATIENT)
Dept: PRIMARY CARE CLINIC | Age: 75
End: 2025-04-30
Payer: MEDICARE

## 2025-04-30 VITALS
TEMPERATURE: 96.9 F | SYSTOLIC BLOOD PRESSURE: 120 MMHG | DIASTOLIC BLOOD PRESSURE: 84 MMHG | HEART RATE: 65 BPM | RESPIRATION RATE: 16 BRPM | BODY MASS INDEX: 26.93 KG/M2 | OXYGEN SATURATION: 99 % | WEIGHT: 221.2 LBS

## 2025-04-30 DIAGNOSIS — D69.6 THROMBOCYTOPENIA, UNSPECIFIED: ICD-10-CM

## 2025-04-30 DIAGNOSIS — R11.2 NAUSEA AND VOMITING, UNSPECIFIED VOMITING TYPE: ICD-10-CM

## 2025-04-30 DIAGNOSIS — I48.91 ATRIAL FIBRILLATION, UNSPECIFIED TYPE (HCC): ICD-10-CM

## 2025-04-30 DIAGNOSIS — I49.8 BRADYARRHYTHMIA: Primary | ICD-10-CM

## 2025-04-30 PROCEDURE — 1159F MED LIST DOCD IN RCRD: CPT | Performed by: FAMILY MEDICINE

## 2025-04-30 PROCEDURE — 4004F PT TOBACCO SCREEN RCVD TLK: CPT | Performed by: FAMILY MEDICINE

## 2025-04-30 PROCEDURE — 3079F DIAST BP 80-89 MM HG: CPT | Performed by: FAMILY MEDICINE

## 2025-04-30 PROCEDURE — G2211 COMPLEX E/M VISIT ADD ON: HCPCS | Performed by: FAMILY MEDICINE

## 2025-04-30 PROCEDURE — 99214 OFFICE O/P EST MOD 30 MIN: CPT | Performed by: FAMILY MEDICINE

## 2025-04-30 PROCEDURE — 1160F RVW MEDS BY RX/DR IN RCRD: CPT | Performed by: FAMILY MEDICINE

## 2025-04-30 PROCEDURE — 3074F SYST BP LT 130 MM HG: CPT | Performed by: FAMILY MEDICINE

## 2025-04-30 PROCEDURE — G8419 CALC BMI OUT NRM PARAM NOF/U: HCPCS | Performed by: FAMILY MEDICINE

## 2025-04-30 PROCEDURE — G8427 DOCREV CUR MEDS BY ELIG CLIN: HCPCS | Performed by: FAMILY MEDICINE

## 2025-04-30 PROCEDURE — 1123F ACP DISCUSS/DSCN MKR DOCD: CPT | Performed by: FAMILY MEDICINE

## 2025-04-30 ASSESSMENT — PATIENT HEALTH QUESTIONNAIRE - PHQ9
1. LITTLE INTEREST OR PLEASURE IN DOING THINGS: NOT AT ALL
SUM OF ALL RESPONSES TO PHQ QUESTIONS 1-9: 0
2. FEELING DOWN, DEPRESSED OR HOPELESS: NOT AT ALL
SUM OF ALL RESPONSES TO PHQ QUESTIONS 1-9: 0

## 2025-04-30 NOTE — PROGRESS NOTES
2025     Tyrone Osborn (:  1950) is a 74 y.o. male, here for evaluation of the following medical concerns:  Find out hwo the cardiolg is  HPI  History of Present Illness  The patient is a 74-year-old male who presents for evaluation of dizziness, atrial fibrillation, and diverticulosis.    Episodes of low pulse rate have been reported, which have been managed by reducing the dosage of metoprolol. A prescription for metoprolol was given for 14 days, and an appointment with the cardiologist was advised. A history of atrial fibrillation is noted, and no pacemaker has been required. Blood pressure fluctuations are also mentioned. An incident where the pulse rate dropped to 37, leading to an overnight hospital stay, was recalled, although the cardiologist later attributed this to a faulty reading. Instructions from the cardiologist included wiggling feet and legs before standing up to ensure adequate blood flow and increasing salt intake and hydration.    A sudden onset of nausea and vomiting occurred after consuming half a cup of coffee in the morning, persisting until late at night. The vomiting was accompanied by dry heaves and a loss of balance, necessitating crawling for mobility. Despite attempts to hydrate with Gatorade and water, vomiting continued. No fever, COVID-19 symptoms, or diarrhea were experienced. Dehydration is suspected as the cause of symptoms. The patient recalls consuming blackened fish at a restaurant the previous night and burning his mouth with hot chicken from [employer] on  night, for which first aid cream has been applied.    Daily episodes of dizziness and lightheadedness are reported, believed to be side effects of medications, particularly metoprolol. A decrease in energy levels is noted, necessitating rest after lunch. An active lifestyle is maintained, including outdoor activities and walking around the cabin.  The metoprolol was decreased to 25 mg a day and seemed

## 2025-05-03 ASSESSMENT — ENCOUNTER SYMPTOMS
WHEEZING: 0
NAUSEA: 0
CHEST TIGHTNESS: 0
SHORTNESS OF BREATH: 0
ABDOMINAL PAIN: 0
VOMITING: 0
COUGH: 0
EYE DISCHARGE: 0
DIARRHEA: 0

## 2025-05-06 ENCOUNTER — CARE COORDINATION (OUTPATIENT)
Dept: CASE MANAGEMENT | Age: 75
End: 2025-05-06

## 2025-05-06 NOTE — CARE COORDINATION
Care Transitions Note    Follow Up Call     Patient Current Location:  [place]    Care Transition Nurse contacted the patient by telephone. Verified name and  as identifiers.    Additional needs identified to be addressed with provider   No needs identified                 Method of communication with provider: none.    Care Summary Note: CTN spoke with patient who reported he is doing well. Patient denied any dizziness and had follow up with PCP and reported he had his BP checked several times and it was normal. Patient requested Dr. Borges's information because he is thinking about switching over to his service, CTN provided contact information to patient. Denies any acute needs at present time.  Agreeable to f/u calls.  Educated on the use of urgent care or physician’s 24 hr access line if assistance is needed after hours.     Plan of care updates since last contact:  Review of patient management of conditions/medications: .       Advance Care Planning:   Does patient have an Advance Directive: reviewed during previous call, see note. .    Medication Review:  No changes since last call.         Assessments:  Care Transitions Subsequent and Final Call    Subsequent and Final Calls  Do you have any ongoing symptoms?: No  Have your medications changed?: No  Do you have any questions related to your medications?: No  Do you currently have any active services?: No  Do you have any needs or concerns that I can assist you with?: No  Identified Barriers: None  Care Transitions Interventions  Other Interventions:              Follow Up Appointment:   KAILASH appointment attended as scheduled       Care Transition Nurse provided contact information.  Plan for follow-up call in 6-10 days based on severity of symptoms and risk factors.  Plan for next call: self management-.       Betty Horn, MSN, RN, [facility]  Care Transition Nurse  [phone] mobile

## 2025-05-13 ENCOUNTER — TELEPHONE (OUTPATIENT)
Dept: PRIMARY CARE CLINIC | Age: 75
End: 2025-05-13

## 2025-05-13 ENCOUNTER — CARE COORDINATION (OUTPATIENT)
Dept: CASE MANAGEMENT | Age: 75
End: 2025-05-13

## 2025-05-13 NOTE — CARE COORDINATION
Care Transitions Note    Follow Up Call     Attempted to reach patient for transitions of care follow up.  Unable to reach patient.      Outreach Attempts:   Multiple attempts to contact patient at phone numbers on file.     Care Summary Note: lvm    Follow Up Appointment:       Plan for follow-up call in 6-10 days based on severity of symptoms and risk factors. Plan for next call: self management-.    Betty Horn, MSN, RN, [facility]  Care Transition Nurse  [phone] mobile

## 2025-05-15 ENCOUNTER — TELEPHONE (OUTPATIENT)
Dept: PRIMARY CARE CLINIC | Age: 75
End: 2025-05-15

## 2025-05-20 ENCOUNTER — CARE COORDINATION (OUTPATIENT)
Dept: CASE MANAGEMENT | Age: 75
End: 2025-05-20

## 2025-05-20 NOTE — CARE COORDINATION
Care Transitions Note    Final Call     Patient Current Location:  [place]    Care Transition Nurse contacted the patient by telephone. Verified name and  as identifiers.    Patient graduated from the Care Transitions program on 25.  Patient/family has the ability to self-manage at this time..      Advance Care Planning:   Does patient have an Advance Directive: reviewed during previous call, see note. .    Handoff:   Patient was not referred to the ACM team due to no additional needs identified.       Care Summary Note: CTN spoke with patient who reported he is doing fine. Patient has upcoming cataract surgery in  and he has all instructions to follow. Patient reported he has a call to his [facility] cardiologist to discuss decreasing his metoprolol dose as his BP has been lower. Patient denied any other issues or concerns at this time. CTN advised patient of use of urgent care or physician’s 24 hr access line if assistance is needed after hours.          Assessments:  Care Transitions Subsequent and Final Call    Subsequent and Final Calls  Do you have any ongoing symptoms?: No  Have your medications changed?: No  Do you have any questions related to your medications?: No  Do you currently have any active services?: No  Do you have any needs or concerns that I can assist you with?: No  Identified Barriers: None  Care Transitions Interventions  Other Interventions:              Upcoming Appointments:        Patient has agreed to contact primary care provider and/or specialist for any further questions, concerns, or needs.    Betty Horn, MSN, RN, [facility]  Care Transition Nurse  [phone] mobile

## (undated) DEVICE — [STANDARD 12-FLUTE BARREL BUR, ARTHROSCOPIC SHAVER BLADE,  DO NOT RESTERILIZE,  DO NOT USE IF PACKAGE IS DAMAGED,  KEEP DRY,  KEEP AWAY FROM SUNLIGHT]: Brand: FORMULA

## (undated) DEVICE — 3M™ COBAN™ NL STERILE NON-LATEX SELF-ADHERENT WRAP, 2084S, 4 IN X 5 YD (10 CM X 4,5 M), 18 ROLLS/CASE: Brand: 3M™ COBAN™

## (undated) DEVICE — 3M™ TEGADERM™ TRANSPARENT FILM DRESSING FRAME STYLE, 1626W, 4 IN X 4-3/4 IN (10 CM X 12 CM), 50/CT 4CT/CASE: Brand: 3M™ TEGADERM™

## (undated) DEVICE — [ARTHROSCOPY PUMP,  DO NOT USE IF PACKAGE IS DAMAGED,  KEEP DRY,  KEEP AWAY FROM SUNLIGHT,  PROTECT FROM HEAT AND RADIOACTIVE SOURCES.]: Brand: FLOSTEADY

## (undated) DEVICE — SYRINGE MED 10ML LUERLOCK TIP W/O SFTY DISP

## (undated) DEVICE — 4-PORT MANIFOLD: Brand: NEPTUNE 2

## (undated) DEVICE — 3M™ STERI-DRAPE™ U-DRAPE 1015: Brand: STERI-DRAPE™

## (undated) DEVICE — TUBING, SUCTION, 1/4" X 12', STRAIGHT: Brand: MEDLINE

## (undated) DEVICE — 3M™ IOBAN™ 2 ANTIMICROBIAL INCISE DRAPE 6650EZ: Brand: IOBAN™ 2

## (undated) DEVICE — NEEDLE SPNL L3.5IN PNK HUB S STL REG WALL FIT STYL W/ QNCKE

## (undated) DEVICE — CHLORAPREP 26ML ORANGE

## (undated) DEVICE — SHEET,DRAPE,53X77,STERILE: Brand: MEDLINE

## (undated) DEVICE — SPLINT WRST FA R 7

## (undated) DEVICE — GARMENT COMPR L FOR 23IN CALF FLOTRN

## (undated) DEVICE — Z INACTIVE USE 2660664 SOLUTION IRRIG 3000ML 0.9% SOD CHL USP UROMATIC PLAS CONT

## (undated) DEVICE — KIT OR ROOM TURNOVER W/STRAP

## (undated) DEVICE — 1010 S-DRAPE TOWEL DRAPE 10/BX: Brand: STERI-DRAPE™

## (undated) DEVICE — GLOVE SURG SZ 8 L12IN FNGR THK87MIL WHT LTX FREE

## (undated) DEVICE — ELECTRODE ES 90DEG SUCT INTEGR HNDPC DISP COOLPULSE 90 VAPR

## (undated) DEVICE — SHOULDER CANNULA SET WITHOUT FENESTRATIONS, 5.5 MM (I.D.) X 70 MM: Brand: CONMED

## (undated) DEVICE — CANNULA ARTHSCP L7CM ID8.25MM TRNSLUC THRD FLX W/ NO SQUIRT

## (undated) DEVICE — GOWN SIRUS NONREIN XL W/TWL: Brand: MEDLINE INDUSTRIES, INC.

## (undated) DEVICE — STRIP,CLOSURE,WOUND,MEDI-STRIP,1/2X4: Brand: MEDLINE

## (undated) DEVICE — INTENDED FOR TISSUE SEPARATION, AND OTHER PROCEDURES THAT REQUIRE A SHARP SURGICAL BLADE TO PUNCTURE OR CUT.: Brand: BARD-PARKER ® STAINLESS STEEL BLADES

## (undated) DEVICE — DRAPE,U/SHT,SPLIT,FILM,60X84,STERILE: Brand: MEDLINE

## (undated) DEVICE — IMMOBILIZER SHLDR SWTH UNIV DEV BLK P.A.D. III

## (undated) DEVICE — PAD DRY FLOOR ABS 32X58IN GRN

## (undated) DEVICE — COVER LT HNDL BLU PLAS

## (undated) DEVICE — SPONGE GZ W4XL4IN COT 12 PLY TYP VII WVN C FLD DSGN

## (undated) DEVICE — NEEDLE SUT FOR EXPRESSEW LL AND LLL SUT PASS EXPRESSEW LLL

## (undated) DEVICE — SUTURE FIBERWIRE SZ 2 W/ TAPERED NEEDLE BLUE L38IN NONABSORB BLU L26.5MM 1/2 CIRCLE AR7200

## (undated) DEVICE — [AGGRESSIVE PLUS CUTTER, ARTHROSCOPIC SHAVER BLADE,  DO NOT RESTERILIZE,  DO NOT USE IF PACKAGE IS DAMAGED,  KEEP DRY,  KEEP AWAY FROM SUNLIGHT]: Brand: FORMULA

## (undated) DEVICE — GLOVE SURG SZ 75 L12IN FNGR THK87MIL WHT LTX FREE

## (undated) DEVICE — MAJOR SET UP PK

## (undated) DEVICE — PACK,SHOULDER,DRAPE,POUCH: Brand: MEDLINE

## (undated) DEVICE — SUTURE PROL SZ 3-0 L18IN NONABSORBABLE BLU L30MM FS-1 3/8 8663G